# Patient Record
Sex: FEMALE | Race: ASIAN | NOT HISPANIC OR LATINO | Employment: UNEMPLOYED | URBAN - METROPOLITAN AREA
[De-identification: names, ages, dates, MRNs, and addresses within clinical notes are randomized per-mention and may not be internally consistent; named-entity substitution may affect disease eponyms.]

---

## 2017-01-06 ENCOUNTER — HOSPITAL ENCOUNTER (OUTPATIENT)
Dept: MAMMOGRAPHY | Facility: HOSPITAL | Age: 47
Discharge: HOME | End: 2017-01-06

## 2017-01-06 ENCOUNTER — RECORDS - HEALTHEAST (OUTPATIENT)
Dept: ADMINISTRATIVE | Facility: OTHER | Age: 47
End: 2017-01-06

## 2017-01-06 DIAGNOSIS — N64.4 BREAST PAIN, LEFT: ICD-10-CM

## 2017-06-17 ENCOUNTER — HEALTH MAINTENANCE LETTER (OUTPATIENT)
Age: 47
End: 2017-06-17

## 2017-12-09 ENCOUNTER — HOSPITAL ENCOUNTER (OUTPATIENT)
Dept: MAMMOGRAPHY | Facility: HOSPITAL | Age: 47
Discharge: HOME | End: 2017-12-09

## 2017-12-09 DIAGNOSIS — Z12.31 VISIT FOR SCREENING MAMMOGRAM: ICD-10-CM

## 2018-01-12 ENCOUNTER — HOSPITAL ENCOUNTER (OUTPATIENT)
Dept: MAMMOGRAPHY | Facility: HOSPITAL | Age: 48
Discharge: HOME | End: 2018-01-12
Attending: INTERPRETER

## 2019-01-28 ENCOUNTER — HOSPITAL ENCOUNTER (OUTPATIENT)
Dept: MAMMOGRAPHY | Facility: CLINIC | Age: 49
Discharge: HOME | End: 2019-01-28

## 2019-01-28 DIAGNOSIS — Z12.31 VISIT FOR SCREENING MAMMOGRAM: ICD-10-CM

## 2019-10-26 ENCOUNTER — AMBULATORY - HEALTHEAST (OUTPATIENT)
Dept: NURSING | Facility: CLINIC | Age: 49
End: 2019-10-26

## 2020-01-28 ENCOUNTER — TRANSFERRED RECORDS (OUTPATIENT)
Dept: HEALTH INFORMATION MANAGEMENT | Facility: CLINIC | Age: 50
End: 2020-01-28
Payer: COMMERCIAL

## 2020-09-17 ENCOUNTER — AMBULATORY - HEALTHEAST (OUTPATIENT)
Dept: NURSING | Facility: CLINIC | Age: 50
End: 2020-09-17

## 2022-03-24 ENCOUNTER — HOSPITAL ENCOUNTER (EMERGENCY)
Facility: HOSPITAL | Age: 52
Discharge: HOME | End: 2022-03-24
Admitting: EMERGENCY MEDICINE
Payer: COMMERCIAL

## 2022-03-24 VITALS
BODY MASS INDEX: 24.37 KG/M2 | DIASTOLIC BLOOD PRESSURE: 60 MMHG | WEIGHT: 129 LBS | HEART RATE: 73 BPM | TEMPERATURE: 97.9 F | SYSTOLIC BLOOD PRESSURE: 126 MMHG | OXYGEN SATURATION: 97 % | RESPIRATION RATE: 18 BRPM

## 2022-03-24 DIAGNOSIS — L30.9 DERMATITIS: ICD-10-CM

## 2022-03-24 PROCEDURE — 250N000013 HC RX MED GY IP 250 OP 250 PS 637: Performed by: EMERGENCY MEDICINE

## 2022-03-24 PROCEDURE — 99283 EMERGENCY DEPT VISIT LOW MDM: CPT

## 2022-03-24 RX ORDER — DIPHENHYDRAMINE HCL 50 MG
50 CAPSULE ORAL ONCE
Status: COMPLETED | OUTPATIENT
Start: 2022-03-24 | End: 2022-03-24

## 2022-03-24 RX ORDER — PREDNISONE 20 MG/1
TABLET ORAL
Qty: 10 TABLET | Refills: 0 | Status: SHIPPED | OUTPATIENT
Start: 2022-03-24 | End: 2022-04-07

## 2022-03-24 RX ADMIN — DIPHENHYDRAMINE HCL 50 MG: 50 CAPSULE ORAL at 23:22

## 2022-03-24 ASSESSMENT — ENCOUNTER SYMPTOMS
DIARRHEA: 0
FEVER: 0
ABDOMINAL PAIN: 0
CHILLS: 0
COUGH: 0
SHORTNESS OF BREATH: 0
NAUSEA: 0
VOMITING: 0

## 2022-03-25 NOTE — ED TRIAGE NOTES
Pt is suffering from a red, hot, itchy rash x 1 month. It usually gets worse at night. Cream given and used from primary doctor no help. The rash is located on the trunk.

## 2022-03-25 NOTE — DISCHARGE INSTRUCTIONS
You were seen in the emergency department today for evaluation of a rash.  This appears consistent with a dermatitis of some sort.  I will prescribe you a course of oral steroids to help suppress your bodies allergic response.  I would also recommend taking an antihistamine like Zyrtec, Claritin, or Benadryl.    Follow-up with your primary doctor if your symptoms do not improve.  Return to the ER if you develop any new or worsening symptoms like severe pain, fever, swelling of your lips or tongue, vomiting or diarrhea, worsening rash, or any other symptoms that concern you.

## 2022-03-25 NOTE — ED PROVIDER NOTES
EMERGENCY DEPARTMENT ENCOUNTER      NAME: Cindy Smith  AGE: 51 year old female  YOB: 1970  MRN: [id number]  EVALUATION DATE & TIME: No admission date for patient encounter.    PCP: Debi Nieves    ED PROVIDER: Susan Doyle PA-C      Chief Complaint   Patient presents with     Rash         FINAL IMPRESSION:  1. Dermatitis          ED COURSE & MEDICAL DECISION MAKING:    Pertinent Labs & Imaging studies reviewed. (See chart for details)    51 year old female presents to the Emergency Department for evaluation of rash.    Physical exam is remarkable for a generally well-appearing female who is in no acute distress.  She has an erythematous, fine papular, blanchable rash on her trunk and upper extremities.  No swelling of the lips or tongue.  Heart and lung sounds clear diffusely throughout with no wheezing.  Abdomen soft and nontender.  Vital signs are stable and she is afebrile.    The cause of her rash is not clear at this time.  It appears consistent with a dermatitis of some sort.  She has tried oral antihistamines and steroid cream at home with no significant improvement.  Patient is not diabetic so I think a short course of steroids may be helpful.  Prescription for prednisone sent to her pharmacy, advised to follow-up with her primary care provider if her symptoms do not resolve.  Recommend return to the ER for any new or worsening symptoms, she is agreeable with this treatment plan and verbalized her understanding.    ED Course   11:10 PM Performed my initial history and physical exam. Discussed workup in the emergency department, management of symptoms, and likely disposition.   I discussed the plan for discharge with the patient, and patient is agreeable. We discussed supportive cares at home and reasons for return to the ER including new or worsening symptoms - all questions and concerns addressed. Patient to be discharged by RN.    At the conclusion of the encounter I  discussed the results of all of the tests and the disposition. The questions were answered. The patient or family acknowledged understanding and was agreeable with the care plan.     Voice recognition software was used in the creation of this note. Any grammatical or nonsensical errors are due to inherent errors with the software and are not the intention of the writer.     MEDICATIONS GIVEN IN THE EMERGENCY:  Medications   diphenhydrAMINE (BENADRYL) capsule 50 mg (50 mg Oral Given 3/24/22 9745)       NEW PRESCRIPTIONS STARTED AT TODAY'S ER VISIT  New Prescriptions    PREDNISONE (DELTASONE) 20 MG TABLET    Take two tablets (= 40mg) each day for 5 (five) days            =================================================================    HPI    Patient information was obtained from: Patient    Use of : Declined; spouse acted as  (Yana)        Cindy Smith is a 51 year old female with PMH of proctitis who presents to the emergency department via walk-in with  for evaluation of a rash.    For the past 1 to 2 months, the patient has had a burning, itchy, erythematous rash on her trunk and bilateral upper and lower extremities.  The rash typically presents in the evening and gets worse overnight.  She reports it has increased in severity over the last 48 hours, prompting her presentation today.  She denies any new lotions, detergents, or body washes.   occasionally has a similar rash as well but no other members of the household do.  She has tried Zyrtec and cortisone cream without improvement.  She denies any lip or tongue swelling, vomiting or diarrhea, wheezing, difficulty breathing, fevers, or abdominal pain.      REVIEW OF SYSTEMS   Review of Systems   Constitutional: Negative for chills and fever.   Respiratory: Negative for cough and shortness of breath.    Cardiovascular: Negative for chest pain.   Gastrointestinal: Negative for abdominal pain, diarrhea, nausea and vomiting.    Skin: Positive for rash.         All other systems reviewed and are negative unless noted in HPI.      PAST MEDICAL HISTORY:  Past Medical History:   Diagnosis Date     Urinary frequency        PAST SURGICAL HISTORY:  Past Surgical History:   Procedure Laterality Date     MAMMOPLASTY AUGMENTATION Bilateral 2009     TOTAL HIP ARTHROPLASTY Right        CURRENT MEDICATIONS:    predniSONE (DELTASONE) 20 MG tablet  DULoxetine HCl (CYMBALTA PO)        ALLERGIES:  No Known Allergies    FAMILY HISTORY:  Family History   Problem Relation Age of Onset     Breast Cancer No family hx of        SOCIAL HISTORY:   Social History     Socioeconomic History     Marital status:      Spouse name: Not on file     Number of children: Not on file     Years of education: Not on file     Highest education level: Not on file   Occupational History     Not on file   Tobacco Use     Smoking status: Not on file     Smokeless tobacco: Not on file   Substance and Sexual Activity     Alcohol use: Yes     Comment: rarely     Drug use: No     Sexual activity: Not on file   Other Topics Concern     Not on file   Social History Narrative     Not on file     Social Determinants of Health     Financial Resource Strain: Not on file   Food Insecurity: Not on file   Transportation Needs: Not on file   Physical Activity: Not on file   Stress: Not on file   Social Connections: Not on file   Intimate Partner Violence: Not on file   Housing Stability: Not on file       VITALS:  Patient Vitals for the past 24 hrs:   BP Temp Temp src Pulse Resp SpO2 Weight   03/24/22 2236 131/60 97.9  F (36.6  C) Oral 75 18 96 % --   03/24/22 2234 -- 98.3  F (36.8  C) Oral -- -- -- 58.5 kg (129 lb)       PHYSICAL EXAM    VITAL SIGNS: /60   Pulse 75   Temp 97.9  F (36.6  C) (Oral)   Resp 18   Wt 58.5 kg (129 lb)   LMP 03/13/2022 (Exact Date)   SpO2 96%   BMI 24.37 kg/m    General Appearance: Alert, cooperative, normal speech and facial symmetry, appears  stated age, the patient does not appear in distress  Head:  Normocephalic, without obvious abnormality, atraumatic  Eyes: Conjunctiva/corneas clear, EOM's intact, no nystagmus, PERRL  ENT:  Lips, mucosa, and tongue normal; teeth and gums normal, no pharyngeal inflammation, no dysphonia or difficulty swallowing, membranes are moist without pallor  Cardio:  Regular rate and rhythm, S1 and S2 normal, no murmur, rub    or gallop, 2+ pulses symmetric in all extremities  Pulm:  Clear to auscultation bilaterally, respirations unlabored with no accessory muscle use  Abdomen:  Abdomen is soft, non-distended with no tenderness to palpation, rebound tenderness, or guarding.   Extremities: Moves all extremities  Skin:  Erythematous, fine papular, blanchable rash on her trunk and upper extremities  Neuro: Patient is awake, alert, and responsive to voice. No gross motor weaknesses or sensory loss; moves all extremities.     LAB:  All pertinent labs reviewed and interpreted.  Labs Ordered and Resulted from Time of ED Arrival to Time of ED Departure - No data to display    RADIOLOGY:  Reviewed all pertinent imaging. Please see official radiology report.  No orders to display        Susan Doyle PA-C  Emergency Medicine  [facility] EMERGENCY DEPARTMENT  [address] [id number] [phone]  Dept: [phone]       Susan Doyle PA-C  03/24/22 7618

## 2022-04-07 ENCOUNTER — HOSPITAL ENCOUNTER (EMERGENCY)
Facility: HOSPITAL | Age: 52
Discharge: HOME | End: 2022-04-07
Attending: EMERGENCY MEDICINE | Admitting: EMERGENCY MEDICINE
Payer: COMMERCIAL

## 2022-04-07 VITALS
DIASTOLIC BLOOD PRESSURE: 63 MMHG | BODY MASS INDEX: 24.55 KG/M2 | RESPIRATION RATE: 15 BRPM | HEART RATE: 74 BPM | TEMPERATURE: 98.4 F | HEIGHT: 61 IN | WEIGHT: 130 LBS | OXYGEN SATURATION: 96 % | SYSTOLIC BLOOD PRESSURE: 136 MMHG

## 2022-04-07 DIAGNOSIS — L20.9 ATOPIC DERMATITIS, UNSPECIFIED TYPE: ICD-10-CM

## 2022-04-07 PROCEDURE — 99282 EMERGENCY DEPT VISIT SF MDM: CPT

## 2022-04-07 RX ORDER — MINERAL OIL/HYDROPHIL PETROLAT
OINTMENT (GRAM) TOPICAL PRN
Qty: 396 G | Refills: 0 | Status: SHIPPED | OUTPATIENT
Start: 2022-04-07 | End: 2022-04-11

## 2022-04-07 RX ORDER — PREDNISONE 20 MG/1
TABLET ORAL
Qty: 10 TABLET | Refills: 0 | Status: SHIPPED | OUTPATIENT
Start: 2022-04-07 | End: 2022-05-05

## 2022-04-07 ASSESSMENT — ENCOUNTER SYMPTOMS
SORE THROAT: 1
SHORTNESS OF BREATH: 0

## 2022-04-07 NOTE — ED PROVIDER NOTES
"ED Triage Provider Note  [facility]  Encounter Date: Apr 7, 2022    History:  Chief Complaint   Patient presents with     Rash     Cindy Smith is a 51 year old female who presents to the ED with itchy rash to neck and body. Started last month, was on prednisone from 3/24-29 which helped, but it has since returned and is more bothersome. No oral involvement     Review of Systems:  See HPI    Exam:  /63   Pulse 74   Temp 98.4  F (36.9  C) (Temporal)   Resp 15   Ht 1.549 m (5' 1\")   Wt 59 kg (130 lb)   LMP 03/13/2022 (Exact Date)   SpO2 96%   BMI 24.56 kg/m    General: No acute distress. Appears stated age.   Cardio: Regular rate, extremities well perfused  Resp: Normal work of breathing, grossly normal respiratory rate  Neuro: Alert. CN II-XII grossly intact. Grossly intact strength.   Skin: erythema to neck     Medical Decision Making:  Patient arriving to the ED with problem as above. A medical screening exam was performed. Orders initiated from Triage. The patient is appropriate to wait in triage.    Initial DDx includes: urticaria, dermatitis    Wilmer Harper MD on 4/7/2022 at 5:44 PM     Wilmer Harper MD  04/07/22 1746    "

## 2022-04-07 NOTE — DISCHARGE INSTRUCTIONS
Avoid any jewelry around your neck. Use the aquaphor twice a day. Use a scentless soap like Vanicream soap to shower. Please follow-up with your regular doctor as scheduled for Monday.

## 2022-04-07 NOTE — ED PROVIDER NOTES
EMERGENCY DEPARTMENT ENCOUNTER      NAME: Cindy Smith  AGE: 51 year old female  YOB: 1970  MRN: [id number]  EVALUATION DATE & TIME: 4/7/2022  5:51 PM    PCP: Milli Cooper    ED PROVIDER: Niecy Horn MD      Chief Complaint   Patient presents with     Rash         FINAL IMPRESSION:  1. Atopic dermatitis, unspecified type          ED COURSE & MEDICAL DECISION MAKING:    Pertinent Labs & Imaging studies reviewed. (See chart for details)  51 year old female presents to the Emergency Department for evaluation of rash.  The patient was seen here about 1 to 2 weeks ago for similar rash.  The day after finishing a 5-day course of prednisone which had improved the rash, it returned.  She has had ongoing itching and some mild edema associated with the rash.  It is visible on her neck.  She denies any new exposures, including no new medications, soaps, detergents.  She also reported itching on her abdomen and her legs.  On my exam, she reports that has currently improved and there is no visible rash on her abdomen or legs or any other part of her body.  The rash on her neck appears more like a local area of eczema with associated excoriations.  I do not believe there is an associated infection.  The patient reports that the itching and soreness was so bad yesterday that she had a sore throat, today she does not have a sore throat.  She denies any shortness of breath, throat tightness, abdominal pain, diarrhea, any other systemic illness.  Plan to place the patient on another course of prednisone, recommend management like eczema with an unscented emollient lotion and soap and use of steroids.  The patient does have an outpatient appointment on Monday with her primary care provider.  I recommend keeping this as if this does not improve the rash may be amenable to a biopsy to determine the etiology of it.  I also recommend discontinuing wearing any kind of necklaces or jewelry as this may also be a contact  dermatitis.    5:58 PM I met with the patient to gather history and to perform my initial exam. I discussed the plan for care while in the Emergency Department. PPE (glove and N95 mask) was worn by me during patient encounters while patient wore mask.   6:05 PM We discussed plans for discharge and patient is agreeable.    At the conclusion of the encounter I discussed the results of all of the tests and the disposition. The questions were answered. The patient or family acknowledged understanding and was agreeable with the care plan.     MEDICATIONS GIVEN IN THE EMERGENCY:  Medications - No data to display    NEW PRESCRIPTIONS STARTED AT TODAY'S ER VISIT  New Prescriptions    MINERAL OIL-HYDROPHILIC PETROLATUM (AQUAPHOR) EXTERNAL OINTMENT    Apply topically as needed for dry skin or irritation          =================================================================    HPI    Patient information was obtained from: Patient    Use of : Yes (family member) - Language: Yana Smith is a 51 year old female without a pertinent history who presents to this ED by walk in for evaluation of rash.    Per chart review, patient was seen in ED on 3/24/22 (2 weeks ago) for rash. She was given benadryl and discharge with a 5 day course of prednisone (10 mg BID)    Patient reports she has had a rash to her neck for several weeks. She was previously seen for this 2 weeks ago and took prescribed prednisone with improvement. However, 1 day after finishing the course of medications the rash returned with associated itching, pain, and swelling to her neck. Patient additionally notes some rash to her abdomen and lower extremities but states currently this has improved. She notes a history of similar symptoms that resolved after several days. She initially used benadryl and a cream without relief. She denies any recent changes in soaps, detergents, or pets. Patient reports a sore throat yesterday but denies any  throat tightness or difficulty breathing. Patient does take medications for depression and constipation but denies any recent change. She denies smoking or EtOH consumption. Of note, patient does have an appointment with her PCP on Monday (4/11/22). Patient denies additional medical concerns or complaints at this time.      REVIEW OF SYSTEMS   Review of Systems   HENT: Positive for sore throat.         Negative for throat tightness.   Respiratory: Negative for shortness of breath.    Skin: Positive for rash (neck; abdomen; and lower extremities; pruritic and painful).        Positive for neck swelling.   All other systems reviewed and are negative.     PAST MEDICAL HISTORY:  Past Medical History:   Diagnosis Date     Urinary frequency        PAST SURGICAL HISTORY:  Past Surgical History:   Procedure Laterality Date     MAMMOPLASTY AUGMENTATION Bilateral 2009     TOTAL HIP ARTHROPLASTY Right            CURRENT MEDICATIONS:    mineral oil-hydrophilic petrolatum (AQUAPHOR) external ointment  predniSONE (DELTASONE) 20 MG tablet  DULoxetine HCl (CYMBALTA PO)        ALLERGIES:  No Known Allergies    FAMILY HISTORY:  Family History   Problem Relation Age of Onset     Breast Cancer No family hx of        SOCIAL HISTORY:   Social History     Socioeconomic History     Marital status:      Spouse name: Not on file     Number of children: Not on file     Years of education: Not on file     Highest education level: Not on file   Occupational History     Not on file   Tobacco Use     Smoking status: Not on file     Smokeless tobacco: Not on file   Substance and Sexual Activity     Alcohol use: Yes     Comment: rarely     Drug use: No     Sexual activity: Not on file   Other Topics Concern     Not on file   Social History Narrative     Not on file     Social Determinants of Health     Financial Resource Strain: Not on file   Food Insecurity: Not on file   Transportation Needs: Not on file   Physical Activity: Not on file  "  Stress: Not on file   Social Connections: Not on file   Intimate Partner Violence: Not on file   Housing Stability: Not on file       VITALS:  /63   Pulse 74   Temp 98.4  F (36.9  C) (Temporal)   Resp 15   Ht 1.549 m (5' 1\")   Wt 59 kg (130 lb)   LMP 03/13/2022 (Exact Date)   SpO2 96%   BMI 24.56 kg/m      PHYSICAL EXAM    Constitutional: Well developed, Well nourished, NAD  HENT: Normocephalic, Atraumatic, Bilateral external ears normal, Oropharynx normal, mucous membranes moist, Nose normal.   Neck- Normal range of motion, No tenderness, Supple, No stridor.  Eyes: PERRL, EOMI, Conjunctiva normal, No discharge.   Respiratory: Normal breath sounds, No respiratory distress  Cardiovascular: Normal heart rate, Regular rhythm  GI: Bowel sounds normal, Soft, No tenderness,   Musculoskeletal: No edema. Good range of motion in all major joints. No tenderness to palpation or major deformities noted.   Integument: Warm, Dry, erythema circumferentially around the neck with associated excoriations. NO warmth, some mild soft tissue edema along the left anterior neck with no fluctuance or palpable mass. NO rash appreciated on abdomen or legs.  Neurologic: Alert & oriented x 3, Normal motor function, Normal sensory function, No focal deficits noted. Normal gait.   Psychiatric: Affect normal, Judgment normal, Mood normal.      LAB:  All pertinent labs reviewed and interpreted.       RADIOLOGY:  Reviewed all pertinent imaging. Please see official radiology report.  No orders to display       I, Wen Prince, am serving as a scribe to document services personally performed by Niecy Horn, based on my observation and the provider's statements to me. I, Niecy Horn MD, attest that Wen Prince is acting in a scribe capacity, has observed my performance of the services and has documented them in accordance with my direction.    Niecy Horn MD  Emergency Medicine  [facility] EMERGENCY DEPARTMENT  [address] [place] [id number]  [phone]     Niecy Horn MD  04/07/22 4805

## 2022-04-07 NOTE — ED TRIAGE NOTES
Patient reporting rash to left neck and abdomen, has been seen here before for this and received prednisone which helped but now the rash has returned and is itchy and painful.

## 2022-04-08 ENCOUNTER — PATIENT OUTREACH (OUTPATIENT)
Dept: FAMILY MEDICINE | Facility: CLINIC | Age: 52
End: 2022-04-08
Payer: COMMERCIAL

## 2022-04-08 NOTE — PROGRESS NOTES
I got a call from the pt, pt stated that she is doing fine. She stated that she has a follow up on 04/11/2022 at 8:20am with .

## 2022-04-08 NOTE — PROGRESS NOTES
Clinic Care Coordination Contact    Follow Up Progress Note      Assessment: The pt was recently in the ED, I called to check up on the pt, and help the pt setup a ED follow up. The pt was at [facility] for a rash. I called the pt, but got her vm, so l left a vm for the pt to give me a call back.    Care Gaps:    Health Maintenance Due   Topic Date Due     PREVENTIVE CARE VISIT  Never done     ADVANCE CARE PLANNING  Never done     COLORECTAL CANCER SCREENING  Never done     HIV SCREENING  Never done     HEPATITIS C SCREENING  Never done     PAP  Never done     LIPID  Never done     MAMMO SCREENING  01/28/2020     ZOSTER IMMUNIZATION (1 of 2) Never done     PHQ-2 (once per calendar year)  Never done     COVID-19 Vaccine (2 - Moderna 3-dose series) 02/08/2022           Goals addressed this encounter:   Goals Addressed    None         Intervention/Education provided during outreach:               Plan:     Care Coordinator will follow up in month

## 2022-04-11 ENCOUNTER — OFFICE VISIT (OUTPATIENT)
Dept: FAMILY MEDICINE | Facility: CLINIC | Age: 52
End: 2022-04-11
Payer: COMMERCIAL

## 2022-04-11 VITALS
RESPIRATION RATE: 16 BRPM | HEIGHT: 61 IN | HEART RATE: 71 BPM | BODY MASS INDEX: 24.51 KG/M2 | WEIGHT: 129.8 LBS | TEMPERATURE: 98 F | DIASTOLIC BLOOD PRESSURE: 81 MMHG | OXYGEN SATURATION: 99 % | SYSTOLIC BLOOD PRESSURE: 126 MMHG

## 2022-04-11 DIAGNOSIS — K62.89 PROCTITIS: ICD-10-CM

## 2022-04-11 DIAGNOSIS — L29.89 CHRONIC PRURITIC RASH IN ADULT: Primary | ICD-10-CM

## 2022-04-11 DIAGNOSIS — Z12.31 VISIT FOR SCREENING MAMMOGRAM: ICD-10-CM

## 2022-04-11 DIAGNOSIS — F33.1 MODERATE EPISODE OF RECURRENT MAJOR DEPRESSIVE DISORDER (H): ICD-10-CM

## 2022-04-11 PROCEDURE — 99204 OFFICE O/P NEW MOD 45 MIN: CPT | Performed by: FAMILY MEDICINE

## 2022-04-11 PROCEDURE — T1013 SIGN LANG/ORAL INTERPRETER: HCPCS | Performed by: FAMILY MEDICINE

## 2022-04-11 RX ORDER — TRAZODONE HYDROCHLORIDE 50 MG/1
1 TABLET, FILM COATED ORAL
COMMUNITY
Start: 2022-03-15 | End: 2022-08-04

## 2022-04-11 RX ORDER — ARIPIPRAZOLE 5 MG/1
5 TABLET ORAL DAILY
COMMUNITY
Start: 2022-03-15 | End: 2022-08-04

## 2022-04-11 RX ORDER — HYDROXYZINE PAMOATE 25 MG/1
25 CAPSULE ORAL 3 TIMES DAILY PRN
Qty: 90 CAPSULE | Refills: 0 | Status: SHIPPED | OUTPATIENT
Start: 2022-04-11 | End: 2022-08-04

## 2022-04-11 RX ORDER — MESALAMINE 1000 MG/1
1000 SUPPOSITORY RECTAL AT BEDTIME
COMMUNITY
Start: 2021-06-15 | End: 2022-08-04

## 2022-04-11 RX ORDER — ACETAMINOPHEN 160 MG
2000 TABLET,DISINTEGRATING ORAL DAILY
COMMUNITY
Start: 2021-12-30 | End: 2022-08-04

## 2022-04-11 RX ORDER — MINERAL OIL/HYDROPHIL PETROLAT
OINTMENT (GRAM) TOPICAL 2 TIMES DAILY
Qty: 396 G | Refills: 0 | Status: SHIPPED | OUTPATIENT
Start: 2022-04-11 | End: 2022-08-04

## 2022-04-11 RX ORDER — PAROXETINE 20 MG/1
20 TABLET, FILM COATED ORAL DAILY
COMMUNITY
Start: 2022-03-15 | End: 2022-08-04

## 2022-04-11 RX ORDER — BALSALAZIDE DISODIUM 750 MG/1
750 CAPSULE ORAL 3 TIMES DAILY
COMMUNITY
Start: 2022-03-15 | End: 2022-08-04

## 2022-04-11 RX ORDER — TRIAMCINOLONE ACETONIDE 1 MG/G
OINTMENT TOPICAL 2 TIMES DAILY
Qty: 80 G | Refills: 1 | Status: SHIPPED | OUTPATIENT
Start: 2022-04-11 | End: 2022-08-04

## 2022-04-11 NOTE — NURSING NOTE
Due to patient being non-English speaking/uses sign language, an  was used for this visit. Only for face-to-face interpretation by an external agency, date and length of interpretation can be found on the scanned worksheet.     name: Jayne  Agency: [employer]  Language: Debbieong   Telephone number: [phone]  Type of interpretation: Face-to-face, spoken

## 2022-04-11 NOTE — PROGRESS NOTES
Assessment & Plan     Chronic pruritic rash in adult    - mineral oil-hydrophilic petrolatum (AQUAPHOR) external ointment; Apply topically 2 times daily  - triamcinolone (KENALOG) 0.1 % external ointment; Apply topically 2 times daily  - hydrOXYzine (VISTARIL) 25 MG capsule; Take 1 capsule (25 mg) by mouth as needed in the morning and 1 capsule (25 mg) as needed at noon and 1 capsule (25 mg) as needed in the evening for itching.    Visit for screening mammogram    - MA SCREENING DIGITAL BILAT - Future  (s+30); Future    Proctitis  Follows with GI, taking medication    Moderate episode of recurrent major depressive disorder (H)  Continue to follow with psychiatry. Stable on current medications                   No follow-ups on file.    Milli Cooper DO  [facility] [place]    Vi White is a 51 year old who presents for the following health issues     HPI     Insurance change so needs a new clinic.    1. Rash: Has been to the ER on 3/24/22 and 4/7/22 for rash on neck. Was given a prescription for prednisone for home and benadryl was given in ER. The second time in the ER was given a prescription for aquaphor (but never picked up the ointment).     She was seen at a  clinic in 11/2021 also for rash of the lower extremities. She states the rash on the body and neck started at the same time of legs.  also has a rash. His itching is worse at night. Sleep in same bed. Has a new mattress in her home and was brand new from store.   Also has tried hydrocortisone cream, but did not feel it was helpful     2. Mental health concerns- Sees Dr. Carcamo on paroxetine and abilify for depression with psychotic features    3. Colitis: Follows with GI, has Rectal suppository at home and uses if she starts to see blood in her stool.     Medication list was updated.  HCM: colonoscopy 2020, mammogram- accepted      Objective    /81   Pulse 71   Temp 98  F (36.7  C) (Oral)   Resp 16   Ht  "1.549 m (5' 1\")   Wt 58.9 kg (129 lb 12.8 oz)   LMP 03/13/2022 (Exact Date)   SpO2 99%   BMI 24.53 kg/m    Body mass index is 24.53 kg/m .  Physical Exam   GENERAL: healthy, alert and no distress  RESP: lungs clear to auscultation - no rales, rhonchi or wheezes  CV: regular rate and rhythm, normal S1 S2, no S3 or S4, no murmur, click or rub, no peripheral edema and peripheral pulses strong  MS: no gross musculoskeletal defects noted, no edema  SKIN: rash at left side of neck, well demarcated, 2-3cm in diameter, erythematous, excoriation present, also on lower legs few scared circular lesion                "

## 2022-05-05 ENCOUNTER — OFFICE VISIT (OUTPATIENT)
Dept: FAMILY MEDICINE | Facility: CLINIC | Age: 52
End: 2022-05-05
Payer: COMMERCIAL

## 2022-05-05 VITALS
BODY MASS INDEX: 23.37 KG/M2 | SYSTOLIC BLOOD PRESSURE: 110 MMHG | HEIGHT: 62 IN | HEART RATE: 66 BPM | RESPIRATION RATE: 16 BRPM | DIASTOLIC BLOOD PRESSURE: 74 MMHG | WEIGHT: 127 LBS | OXYGEN SATURATION: 99 %

## 2022-05-05 DIAGNOSIS — L29.89 CHRONIC PRURITIC RASH IN ADULT: Primary | ICD-10-CM

## 2022-05-05 DIAGNOSIS — Z11.59 NEED FOR HEPATITIS C SCREENING TEST: ICD-10-CM

## 2022-05-05 DIAGNOSIS — F33.1 MODERATE EPISODE OF RECURRENT MAJOR DEPRESSIVE DISORDER (H): ICD-10-CM

## 2022-05-05 DIAGNOSIS — Z11.4 SCREENING FOR HIV (HUMAN IMMUNODEFICIENCY VIRUS): ICD-10-CM

## 2022-05-05 DIAGNOSIS — Z13.220 SCREENING FOR HYPERLIPIDEMIA: ICD-10-CM

## 2022-05-05 DIAGNOSIS — Z12.4 CERVICAL CANCER SCREENING: ICD-10-CM

## 2022-05-05 DIAGNOSIS — K62.89 PROCTITIS: ICD-10-CM

## 2022-05-05 LAB
ALBUMIN SERPL-MCNC: 3.7 G/DL (ref 3.5–5)
ALP SERPL-CCNC: 53 U/L (ref 45–120)
ALT SERPL W P-5'-P-CCNC: 27 U/L (ref 0–45)
ANION GAP SERPL CALCULATED.3IONS-SCNC: 12 MMOL/L (ref 5–18)
AST SERPL W P-5'-P-CCNC: 21 U/L (ref 0–40)
BASOPHILS # BLD AUTO: 0.1 10E3/UL (ref 0–0.2)
BASOPHILS NFR BLD AUTO: 1 %
BILIRUB SERPL-MCNC: 0.7 MG/DL (ref 0–1)
BUN SERPL-MCNC: 15 MG/DL (ref 8–22)
CALCIUM SERPL-MCNC: 9 MG/DL (ref 8.5–10.5)
CHLORIDE BLD-SCNC: 108 MMOL/L (ref 98–107)
CHOLEST SERPL-MCNC: 210 MG/DL
CO2 SERPL-SCNC: 22 MMOL/L (ref 22–31)
CREAT SERPL-MCNC: 0.68 MG/DL (ref 0.6–1.1)
EOSINOPHIL # BLD AUTO: 0.3 10E3/UL (ref 0–0.7)
EOSINOPHIL NFR BLD AUTO: 5 %
ERYTHROCYTE [DISTWIDTH] IN BLOOD BY AUTOMATED COUNT: 12.6 % (ref 10–15)
FASTING STATUS PATIENT QL REPORTED: ABNORMAL
GFR SERPL CREATININE-BSD FRML MDRD: >90 ML/MIN/1.73M2
GLUCOSE BLD-MCNC: 94 MG/DL (ref 70–125)
HCT VFR BLD AUTO: 41.7 % (ref 35–47)
HDLC SERPL-MCNC: 70 MG/DL
HGB BLD-MCNC: 13.8 G/DL (ref 11.7–15.7)
HIV 1+2 AB+HIV1 P24 AG SERPL QL IA: NEGATIVE
IMM GRANULOCYTES # BLD: 0 10E3/UL
IMM GRANULOCYTES NFR BLD: 0 %
LDLC SERPL CALC-MCNC: 129 MG/DL
LYMPHOCYTES # BLD AUTO: 1.6 10E3/UL (ref 0.8–5.3)
LYMPHOCYTES NFR BLD AUTO: 33 %
MCH RBC QN AUTO: 30.1 PG (ref 26.5–33)
MCHC RBC AUTO-ENTMCNC: 33.1 G/DL (ref 31.5–36.5)
MCV RBC AUTO: 91 FL (ref 78–100)
MONOCYTES # BLD AUTO: 0.4 10E3/UL (ref 0–1.3)
MONOCYTES NFR BLD AUTO: 8 %
NEUTROPHILS # BLD AUTO: 2.7 10E3/UL (ref 1.6–8.3)
NEUTROPHILS NFR BLD AUTO: 54 %
PLATELET # BLD AUTO: 213 10E3/UL (ref 150–450)
POTASSIUM BLD-SCNC: 3.8 MMOL/L (ref 3.5–5)
PROT SERPL-MCNC: 6.8 G/DL (ref 6–8)
RBC # BLD AUTO: 4.58 10E6/UL (ref 3.8–5.2)
SODIUM SERPL-SCNC: 142 MMOL/L (ref 136–145)
TRIGL SERPL-MCNC: 53 MG/DL
TSH SERPL DL<=0.005 MIU/L-ACNC: 0.95 UIU/ML (ref 0.3–5)
WBC # BLD AUTO: 4.9 10E3/UL (ref 4–11)

## 2022-05-05 PROCEDURE — 87389 HIV-1 AG W/HIV-1&-2 AB AG IA: CPT | Performed by: FAMILY MEDICINE

## 2022-05-05 PROCEDURE — 99214 OFFICE O/P EST MOD 30 MIN: CPT | Performed by: FAMILY MEDICINE

## 2022-05-05 PROCEDURE — 80061 LIPID PANEL: CPT | Performed by: FAMILY MEDICINE

## 2022-05-05 PROCEDURE — 86803 HEPATITIS C AB TEST: CPT | Performed by: FAMILY MEDICINE

## 2022-05-05 PROCEDURE — T1013 SIGN LANG/ORAL INTERPRETER: HCPCS | Performed by: FAMILY MEDICINE

## 2022-05-05 PROCEDURE — 80050 GENERAL HEALTH PANEL: CPT | Performed by: FAMILY MEDICINE

## 2022-05-05 PROCEDURE — 36415 COLL VENOUS BLD VENIPUNCTURE: CPT | Performed by: FAMILY MEDICINE

## 2022-05-05 ASSESSMENT — PATIENT HEALTH QUESTIONNAIRE - PHQ9: SUM OF ALL RESPONSES TO PHQ QUESTIONS 1-9: 16

## 2022-05-05 NOTE — LETTER
May 23, 2022      Cindy Smith  [address] [place] [address]        Dear ,    We are writing to inform you of your test results.      Resulted Orders   HIV Screening   Result Value Ref Range    HIV Antigen Antibody Combo Negative Negative   Hepatitis C Screen Reflex to HCV RNA Quant and Genotype   Result Value Ref Range    Hepatitis C Antibody Nonreactive Nonreactive    Narrative    Assay performance characteristics have not been established for newborns, infants, and children.   Lipid panel reflex to direct LDL Fasting   Result Value Ref Range    Cholesterol 210 (H) <=199 mg/dL    Triglycerides 53 <=149 mg/dL    Direct Measure HDL 70 >=50 mg/dL      Comment:      HDL Cholesterol Reference Range:     0-2 years:   No reference ranges established for patients under 2 years old  at [facility] for lipid analytes.    2-8 years:  Greater than 45 mg/dL     18 years and older:   Female: Greater than or equal to 50 mg/dL   Male:   Greater than or equal to 40 mg/dL    LDL Cholesterol Calculated 129 <=129 mg/dL    Patient Fasting > 8hrs? Unknown    Comprehensive metabolic panel   Result Value Ref Range    Sodium 142 136 - 145 mmol/L    Potassium 3.8 3.5 - 5.0 mmol/L    Chloride 108 (H) 98 - 107 mmol/L    Carbon Dioxide (CO2) 22 22 - 31 mmol/L    Anion Gap 12 5 - 18 mmol/L    Urea Nitrogen 15 8 - 22 mg/dL    Creatinine 0.68 0.60 - 1.10 mg/dL    Calcium 9.0 8.5 - 10.5 mg/dL    Glucose 94 70 - 125 mg/dL    Alkaline Phosphatase 53 45 - 120 U/L    AST 21 0 - 40 U/L    ALT 27 0 - 45 U/L    Protein Total 6.8 6.0 - 8.0 g/dL    Albumin 3.7 3.5 - 5.0 g/dL    Bilirubin Total 0.7 0.0 - 1.0 mg/dL    GFR Estimate >90 >60 mL/min/1.73m2      Comment:      Effective December 21, 2021 eGFRcr in adults is calculated using the 2021 CKD-EPI creatinine equation which includes age and gender (Montez gibbs al., NEJM, DOI: 10.1056/VOURdz3837570)   TSH with free T4 reflex   Result Value Ref Range    TSH 0.95 0.30 - 5.00 uIU/mL   CBC  with platelets and differential   Result Value Ref Range    WBC Count 4.9 4.0 - 11.0 10e3/uL    RBC Count 4.58 3.80 - 5.20 10e6/uL    Hemoglobin 13.8 11.7 - 15.7 g/dL    Hematocrit 41.7 35.0 - 47.0 %    MCV 91 78 - 100 fL    MCH 30.1 26.5 - 33.0 pg    MCHC 33.1 31.5 - 36.5 g/dL    RDW 12.6 10.0 - 15.0 %    Platelet Count 213 150 - 450 10e3/uL    % Neutrophils 54 %    % Lymphocytes 33 %    % Monocytes 8 %    % Eosinophils 5 %    % Basophils 1 %    % Immature Granulocytes 0 %    Absolute Neutrophils 2.7 1.6 - 8.3 10e3/uL    Absolute Lymphocytes 1.6 0.8 - 5.3 10e3/uL    Absolute Monocytes 0.4 0.0 - 1.3 10e3/uL    Absolute Eosinophils 0.3 0.0 - 0.7 10e3/uL    Absolute Basophils 0.1 0.0 - 0.2 10e3/uL    Absolute Immature Granulocytes 0.0 <=0.4 10e3/uL       If you have any questions or concerns, please call the clinic at the number listed above.       Sincerely,      Milli Cooper, DO

## 2022-05-05 NOTE — PROGRESS NOTES
Assessment & Plan     Chronic pruritic rash in adult  Continue with vistaril and Dove sensitive soap and lotions    Screening for HIV (human immunodeficiency virus)  - HIV Screening  - HIV Screening    Need for hepatitis C screening test  - Hepatitis C Screen Reflex to HCV RNA Quant and Genotype  - Hepatitis C Screen Reflex to HCV RNA Quant and Genotype      Screening for hyperlipidemia  - Lipid panel reflex to direct LDL Fasting  - Lipid panel reflex to direct LDL Fasting    Moderate episode of recurrent major depressive disorder (H)  Plan to continue with therapy and psychiatry  - Comprehensive metabolic panel  - TSH with free T4 reflex  - Comprehensive metabolic panel  - TSH with free T4 reflex    Proctitis  Should have appointment with GI in the near future, will need colonoscopy surveillance for IBD  - CBC with Platelets & Differential  - CBC with Platelets & Differential    Scheduled for mammogram in June. Plan for pap/pelvic in June.               Depression Screening Follow Up    PHQ 5/5/2022   PHQ-9 Total Score 16   Q9: Thoughts of better off dead/self-harm past 2 weeks Not at all       Follow Up    Follow Up Actions Taken  Crisis resource information provided in the After Visit Summary  Referred patient back to mental health provider    Discussed the following ways the patient can remain in a safe environment:  be around others    No follow-ups on file.    Milli Cooper DO  [facility]    Vi White is a 51 year old who presents for the following health issues  accompanied by her .    HPI     1. Chronic rash: Vistaril helped with the itchiness. Comes and goes and gets very itchy at times. Heat does not trigger the rash, but a cool compress does feel better. She has been scratching at her left flank area the most over the last few days.    2. Depression/Anxiety: Most of her depression is around the concerns of her medical issues. She takes Abilify and  "Paroxetine in the morning. Has a therapist. Ivette Padilla who she see once a month. Dr Carcamo is her psychiatrist and prescribes her medications thru [facility].   PHQ 5/5/2022   PHQ-9 Total Score 16   Q9: Thoughts of better off dead/self-harm past 2 weeks Not at all         3. HCM: Mammogram is scheduled for June 9th,   Will get labs today, send lab results by mail, last colonoscopy was 1/2020 (normal , hemorrhoids).    4.  Ulcerative proctitis: Follows with [facility], but has not been there for awhile due to the pandemic.      Objective    /74   Pulse 66   Resp 16   Ht 1.575 m (5' 2\")   Wt 57.6 kg (127 lb)   LMP 03/13/2022 (Exact Date)   SpO2 99%   BMI 23.23 kg/m    Body mass index is 23.23 kg/m .  Physical Exam   GENERAL: healthy, alert and no distress  RESP: lungs clear to auscultation - no rales, rhonchi or wheezes  CV: regular rate and rhythm, normal S1 S2, no S3 or S4, no murmur, click or rub, no peripheral edema and peripheral pulses strong  ABDOMEN: soft, nontender, no hepatosplenomegaly, no masses and bowel sounds normal  MS: no gross musculoskeletal defects noted, no edema  SKIN: excoriation - left flank area                    "

## 2022-05-05 NOTE — PATIENT INSTRUCTIONS
For your rash: Consider sensitive lotion such as Aveeno or Eucerin    Thank for going to get your mammogram on 6-9-2022!    Schedule next appointment in June for pap/pelvic exam

## 2022-05-05 NOTE — NURSING NOTE
Due to patient being non-English speaking/uses sign language, an  was used for this visit. Only for face-to-face interpretation by an external agency, date and length of interpretation can be found on the scanned worksheet.     name: Nina  Agency: [employer]  Language: Debbieong   Telephone number: [phone]  Type of interpretation: Face-to-face, spoken

## 2022-05-06 LAB — HCV AB SERPL QL IA: NONREACTIVE

## 2022-06-09 ENCOUNTER — ANCILLARY PROCEDURE (OUTPATIENT)
Dept: MAMMOGRAPHY | Facility: CLINIC | Age: 52
End: 2022-06-09
Attending: FAMILY MEDICINE
Payer: COMMERCIAL

## 2022-06-09 DIAGNOSIS — Z12.31 VISIT FOR SCREENING MAMMOGRAM: ICD-10-CM

## 2022-06-09 PROCEDURE — 77067 SCR MAMMO BI INCL CAD: CPT

## 2022-07-21 NOTE — PROGRESS NOTES
Assessment & Plan     Moderate episode of recurrent major depressive disorder (H)  Symptoms presently well controlled. Denies any need for medication refills at this time. Desires referral for new psychiatrist for medication management, since her previous psychiatrist is now not covered with insurance change.   - Adult Mental Health  Referral; Future  -Continue regular therapy as able  -Return to clinic as needed             FUTURE APPOINTMENTS:       - Follow-up visit for PAP as able    No follow-ups on file.    Sohail Hoffmann, DO [facility]    Precepted with Dr. Sandra Smith MD      Vi White is a 52 year old, presenting for the following health issues:  Recheck Medication (Like to discuss medication./Need med refill. See psychiatrist every 2 months./Like to see pharmacist.)      HPI     Depression Followup    How are you doing with your depression since your last visit? No change, everything is going well at the [facility]. Humble sees psychiatry again in September.     Are you having other symptoms that might be associated with depression? No    Have you had a significant life event?  No     Are you feeling anxious or having panic attacks?   No    Do you have any concerns with your use of alcohol or other drugs? No    She describes her overall mental health as stable. No hallucinations, psychoses, no low mood or suicidal thoughts presently. She sees a therapist. She would like a referral to a different psychiatrist, since her insurance has recently changed.    Current medications:  Abilify 5mg daily  Paxil 20mg daily  Trazodone 50mg at bedtime    Social History     Tobacco Use     Smoking status: Never Smoker     Smokeless tobacco: Never Used   Substance Use Topics     Alcohol use: Yes     Comment: rarely     Drug use: No     PHQ 5/5/2022 7/22/2022   PHQ-9 Total Score 16 0   Q9: Thoughts of better off dead/self-harm past 2 weeks Not at all Not at all     No flowsheet  "data found.  Last PHQ-9 7/22/2022   1.  Little interest or pleasure in doing things 0   2.  Feeling down, depressed, or hopeless 0   3.  Trouble falling or staying asleep, or sleeping too much 0   4.  Feeling tired or having little energy 0   5.  Poor appetite or overeating 0   6.  Feeling bad about yourself 0   7.  Trouble concentrating 0   8.  Moving slowly or restless 0   Q9: Thoughts of better off dead/self-harm past 2 weeks 0   PHQ-9 Total Score 0   Difficulty at work, home, or with people Not difficult at all       Suicide Assessment Five-step Evaluation and Treatment (SAFE-T)          Review of Systems         Objective    /78   Pulse 68   Temp 98.7  F (37.1  C) (Oral)   Resp 20   Ht 1.55 m (5' 1.02\")   Wt 56.2 kg (124 lb)   SpO2 98%   BMI 23.41 kg/m    Body mass index is 23.41 kg/m .  Physical Exam   GENERAL: healthy, alert and no distress  EYES: Eyes grossly normal to inspection, conjunctivae and sclerae normal  NECK: No asymmetry or scars  RESP: No increased work of breathing  CV: No cyanosis  MS: no gross musculoskeletal defects noted, no edema  SKIN: no suspicious lesions or rashes  PSYCH: mentation appears normal, affect normal/bright      ----- Service Performed and Documented by Resident or Fellow ------            .  ..  "

## 2022-07-22 ENCOUNTER — OFFICE VISIT (OUTPATIENT)
Dept: FAMILY MEDICINE | Facility: CLINIC | Age: 52
End: 2022-07-22
Payer: COMMERCIAL

## 2022-07-22 VITALS
SYSTOLIC BLOOD PRESSURE: 109 MMHG | HEIGHT: 61 IN | TEMPERATURE: 98.7 F | DIASTOLIC BLOOD PRESSURE: 78 MMHG | HEART RATE: 68 BPM | WEIGHT: 124 LBS | BODY MASS INDEX: 23.41 KG/M2 | RESPIRATION RATE: 20 BRPM | OXYGEN SATURATION: 98 %

## 2022-07-22 DIAGNOSIS — F33.1 MODERATE EPISODE OF RECURRENT MAJOR DEPRESSIVE DISORDER (H): Primary | ICD-10-CM

## 2022-07-22 PROCEDURE — T1013 SIGN LANG/ORAL INTERPRETER: HCPCS | Performed by: STUDENT IN AN ORGANIZED HEALTH CARE EDUCATION/TRAINING PROGRAM

## 2022-07-22 PROCEDURE — 99213 OFFICE O/P EST LOW 20 MIN: CPT | Mod: GC | Performed by: STUDENT IN AN ORGANIZED HEALTH CARE EDUCATION/TRAINING PROGRAM

## 2022-07-22 ASSESSMENT — PATIENT HEALTH QUESTIONNAIRE - PHQ9: SUM OF ALL RESPONSES TO PHQ QUESTIONS 1-9: 0

## 2022-07-22 NOTE — PROGRESS NOTES
Due to patient being non-English speaking/uses sign language, an  was used for this visit. Only for face-to-face interpretation by an external agency, date and length of interpretation can be found on the scanned worksheet.     name: Ирина  Agency: [employer]  Language: Yana   Telephone number: [phone]  Type of interpretation: Face-to-face, spoken

## 2022-07-22 NOTE — PROGRESS NOTES
Due to patient being non-English speaking/uses sign language, an  was used for this visit. Only for face-to-face interpretation by an external agency, date and length of interpretation can be found on the scanned worksheet.     name: [employer]   Agency: [employer]  Language: Yana   Telephone number: [phone]  Type of interpretation: Telephone, spoken

## 2022-07-26 NOTE — PROGRESS NOTES
Preceptor Attestation:  Patient's case reviewed and discussed with the resident, Sohail Hoffmann DO, and I personally evaluated the patient. I agree with written assessment and plan of care.    Supervising Physician:  Sandra Smith MD   [facility]

## 2022-07-29 ENCOUNTER — TELEPHONE (OUTPATIENT)
Dept: FAMILY MEDICINE | Facility: CLINIC | Age: 52
End: 2022-07-29

## 2022-07-29 NOTE — TELEPHONE ENCOUNTER
Clinic Care Coordination Contact  [facility]/Voicemail    [email or web address]  Clinical Data: Care Coordinator Outreach  Outreach attempted x 1 with .  Left message on patient's voicemail with call back information and requested return call.  Plan:  Care Coordinator will try to reach patient again in 10 business days.    NISHA VIZCAINO, FOUZIA, LADC

## 2022-08-04 ENCOUNTER — OFFICE VISIT (OUTPATIENT)
Dept: PHARMACY | Facility: CLINIC | Age: 52
End: 2022-08-04
Payer: COMMERCIAL

## 2022-08-04 ENCOUNTER — ALLIED HEALTH/NURSE VISIT (OUTPATIENT)
Dept: FAMILY MEDICINE | Facility: CLINIC | Age: 52
End: 2022-08-04
Payer: COMMERCIAL

## 2022-08-04 VITALS
HEIGHT: 61 IN | DIASTOLIC BLOOD PRESSURE: 78 MMHG | HEART RATE: 65 BPM | WEIGHT: 124 LBS | BODY MASS INDEX: 23.41 KG/M2 | OXYGEN SATURATION: 97 % | TEMPERATURE: 97.9 F | SYSTOLIC BLOOD PRESSURE: 115 MMHG

## 2022-08-04 DIAGNOSIS — F33.1 MODERATE EPISODE OF RECURRENT MAJOR DEPRESSIVE DISORDER (H): ICD-10-CM

## 2022-08-04 DIAGNOSIS — Z23 NEED FOR COVID-19 VACCINE: ICD-10-CM

## 2022-08-04 DIAGNOSIS — K51.311 ULCERATIVE RECTOSIGMOIDITIS WITH RECTAL BLEEDING (H): ICD-10-CM

## 2022-08-04 DIAGNOSIS — E55.9 VITAMIN D DEFICIENCY: Primary | ICD-10-CM

## 2022-08-04 PROCEDURE — 91305 PR COVID VAC PFIZER TRIS-SUCROSE 30MCG/0.3ML IM: CPT

## 2022-08-04 PROCEDURE — 99607 MTMS BY PHARM ADDL 15 MIN: CPT | Performed by: PHARMACIST

## 2022-08-04 PROCEDURE — 99207 PR NO CHARGE NURSE ONLY: CPT

## 2022-08-04 PROCEDURE — 0054A PR ADMIN COVID VAC PFIZER TRS-SUCR, BOOSTER: CPT

## 2022-08-04 PROCEDURE — 99605 MTMS BY PHARM NP 15 MIN: CPT | Performed by: PHARMACIST

## 2022-08-04 RX ORDER — BALSALAZIDE DISODIUM 750 MG/1
2250 CAPSULE ORAL 3 TIMES DAILY
Qty: 810 CAPSULE | Refills: 1 | Status: SHIPPED | OUTPATIENT
Start: 2022-08-04 | End: 2023-01-06

## 2022-08-04 RX ORDER — PAROXETINE 20 MG/1
20 TABLET, FILM COATED ORAL AT BEDTIME
Qty: 90 TABLET | Refills: 3 | Status: SHIPPED | OUTPATIENT
Start: 2022-08-04 | End: 2023-01-06

## 2022-08-04 RX ORDER — TRAZODONE HYDROCHLORIDE 50 MG/1
50 TABLET, FILM COATED ORAL
Qty: 90 TABLET | Refills: 3 | Status: SHIPPED | OUTPATIENT
Start: 2022-08-04 | End: 2023-01-06

## 2022-08-04 RX ORDER — MESALAMINE 1000 MG/1
1000 SUPPOSITORY RECTAL AT BEDTIME
Qty: 90 SUPPOSITORY | Refills: 1 | Status: SHIPPED | OUTPATIENT
Start: 2022-08-04 | End: 2023-01-06

## 2022-08-04 RX ORDER — BALSALAZIDE DISODIUM 750 MG/1
2250 CAPSULE ORAL 3 TIMES DAILY
Start: 2022-08-04 | End: 2022-08-04

## 2022-08-04 RX ORDER — ACETAMINOPHEN 160 MG
2000 TABLET,DISINTEGRATING ORAL DAILY
Qty: 90 CAPSULE | Refills: 3 | Status: SHIPPED | OUTPATIENT
Start: 2022-08-04 | End: 2023-01-06

## 2022-08-04 RX ORDER — ARIPIPRAZOLE 5 MG/1
5 TABLET ORAL DAILY
Qty: 90 TABLET | Refills: 3 | Status: SHIPPED | OUTPATIENT
Start: 2022-08-04 | End: 2023-01-06

## 2022-08-04 ASSESSMENT — ACTIVITIES OF DAILY LIVING (ADL): DEPENDENT_IADLS:: INDEPENDENT

## 2022-08-04 NOTE — Clinical Note
Hi team,  Can you call patient and relay the following message:  Grant White,  After reviewing the records from your previous GI specialist, I recommend you begin using the suppositories nightly rather than just as needed. Based on their last prescription, this is the dosing they recommended. Again, please use the suppositories every day, even if you don't have concern for symptoms hard bowel movements or bloody stools.  Christal Patel

## 2022-08-04 NOTE — PATIENT INSTRUCTIONS
Patient to  medications from the pharmacy.    Patient to take medications as prescribed.     Patient to continue therapy as scheduled.    Patient and SW to work to establish psychiatry for patient.

## 2022-08-04 NOTE — PROGRESS NOTES
Clinic Care Coordination Contact    Follow Up Progress Note      Assessment:     SW met with patient and in-person  this date at [facility] appointment at [facility].    Patient presented to appointment this date:    Appearance: well-groomed;   Speech: normal, non-pressured; not slurred  Emotion: stable, positive  Perception: no hallucinations or dissociation reported by patient  Thought Content: no suicidal or homicidal ideation or intent reported by patient this date,  Insight & Judgement: intact  Cognition: oriented x3; memory intact short-term and long-term    Patient reported continued interest in psychiatry referral. Patient reported interest in [facility] this date and verbally authorized referral to [facility] this date. SW to call patient in one week to confirm whether patient has been called and scheduled for initial psychiatry appointment. Patient vocalized understanding this date,.      Care Gaps:    Health Maintenance Due   Topic Date Due     PREVENTIVE CARE VISIT  Never done     ADVANCE CARE PLANNING  Never done     DEPRESSION ACTION PLAN  Never done     ZOSTER IMMUNIZATION (1 of 2) Never done     PAP  Never done     COVID-19 Vaccine (3 - Moderna risk series) 02/08/2022       Goals addressed this encounter:    Goals Addressed                    This Visit's Progress       Mental Health Management (pt-stated)   On track      Goal Statement: I will get scheduled for a new psychiatrist.  Date Goal set: 8/4/22  Barriers: Non-english speaking  Strengths: Had psychiatrist previously  Date to Achieve By: Earliest availability  Patient expressed understanding of goal: Yes.  Action steps to achieve this goal:  1. I will discuss referral options with clinic SW and submit referral for psychiatry.  2. I will schedule initial psychiatry appointment.  3. I will inform PCP/SW of initial psychiatry appointment date.                Intervention/Education provided during outreach: Mental health referral.      Plan:    Patient and SW to follow-up in one week regarding whether patient has been contacted and scheduled for initial appointment.    Care Coordinator will follow up in one week.     NISHA VIZCAINO, FOUZIA, TANNERC

## 2022-08-04 NOTE — PROGRESS NOTES
Medication Therapy Management (MTM) Encounter    ASSESSMENT:                            Medication Adherence/Access: See below for considerations    Depression: Well controlled based on recent PHQ9, patient declined repeating today. Long history of depression based on chart review. History of SI attempt in 2009, other hospitalization in 2018. History of using Mirtazapine, Fluoxetine. Current regimen anticipate is from psychiatry provider. While Paroxetine maybe not ideal given anticholinergic, weight gain potential anticipate that this is due to previous trials of alternative agents. Working to reestablish with psychiatrist, change necessitated by insurance change. Need to allow continued access to medicines to prevent gap in interim of reestablishing care.     Ulcerative rectosigmoiditis without complication: Last visit with specialist 2/5/2020. Plan at that time was to continue Colazal, stop enemas after 1 month and return to clinic in 6 months. Dose of Colazal matches documentation in Care Everywhere. Overdue for follow up. Call today from referral coordinator indicates that her insurance also not accepted at previous specialist. Need to re-refer. Only record of suppository dosing on order as scheduled rather than as needed.     mesalamine (CANASA) 1000 MG suppository   Insert 1 Suppository rectally daily at bedtime.     Vitamin D: Stable. In future could consider lab draw to determine efficacy of medicine.      Rash: Not addressed in great detail today due to time, need to reassess.     PLAN:                            Due for COVID booster, patient agreeable to this.   Move the Paroxetine pill to bedtime to see if this helps with drowsiness during the day  Refills of all medicines today.   Educate patient on use of mesalamine suppositories daily  Referral placed to establish care with new GI specialist  Connected patient to  referral coordinator to help get set up with new psychiatrist    Follow-up: Return in  about 6 weeks (around 9/15/2022).    SUBJECTIVE/OBJECTIVE:                          Cindy Smith is a 52 year old female coming in for an initial visit. She was referred to me from Dr. Hoffmann.  (ID# Long Padilla) was used during today's visit.      Reason for visit: medication reconciliation / medication access.    Allergies/ADRs: Reviewed in chart  Past Medical History: Reviewed in chart  Social History     Tobacco Use     Smoking status: Never Smoker     Smokeless tobacco: Never Used   Substance Use Topics     Alcohol use: Yes     Comment: rarely     Drug use: No     ^Reviewed today    Medication Adherence/Access: Needing refills on all medicines. Prefers [facility]. No concerns for copayments at pharmacy. Son sets up in pillbox and has alarms on cell phone and  helps provide reminders.     Depression: Abilify 5 mg daily, Paroxetine 20 mg daily, Trazodone at bedtime as needed for sleep. Some concern for drowsiness during the day time. No concerns for dry mouth, dry eyes. Margarito or hot days then dizziness/lightheadedness, otherwise no concerns.     Used to see Dr. Carcamo however now due to insurance difficulty, unable to see this provider any further. Dr. Carcamo is a psychiatrist. Thought today she was meeting with a psychiatrist.     Continues to see [facility] therapist; ongoing for last 3-4 years seeing once every 2 months.     Was unaware that clinic has been working to reach out to patient regarding new referral placed by Dr. Hoffmann.     PHQ 5/5/2022 7/22/2022   PHQ-9 Total Score 16 0   Q9: Thoughts of better off dead/self-harm past 2 weeks Not at all Not at all       Ulcerative rectosigmoiditis without complication: Balsalazide 3 capsules three times daily and Mesalamine suppository just as needed when see bloody stool or hard bowel movement. Estimates that range of use for the suppositories is at most 2-3 times per week to none at all. Last saw specialist 2/2020, just before COVID. History  "provided by patient was that she was having bloody stools, had colonoscopy, then received this diagnosis \"inflammation in colon\". Hasn't received suppositories lately, current supply depleted. No troubles any longer with swallowing tablets.     Vitamin D: Has been prescribed in the past. Taking 2000 units daily.     Rash: Taking some medicine to help with itchiness/rash. Unclear of name, not taking any topical products.     Today's Vitals: /78 (BP Location: Right arm, Patient Position: Sitting, Cuff Size: Adult Regular)   Pulse 65   Temp 97.9  F (36.6  C) (Oral)   Ht 5' 1\" (1.549 m)   Wt 124 lb (56.2 kg)   SpO2 97%   BMI 23.43 kg/m    ----------------      I spent 60 minutes with this patient today. All changes were made via collaborative practice agreement with Dr. Cooper. A copy of the visit note was provided to the patient's provider(s).    The patient was given a summary of these recommendations.     Christal Ellsworth, Pharm.D., CDCES [facility]  Phone: [phone]    For insurance/tracking purposes, [facility] Team Documentation:   Medication Therapy Recommendations  Moderate episode of recurrent major depressive disorder (H)    Current Medication: PARoxetine (PAXIL) 20 MG tablet   Rationale: Undesirable effect - Adverse medication event - Safety   Recommendation: Change Administration Time   Status: Patient Agreed - Adherence/Education         Need for COVID-19 vaccine    Rationale: Preventive therapy - Needs additional medication therapy - Indication   Recommendation: Order Vaccine   Status: Accepted per CPA         Ulcerative rectosigmoiditis with rectal bleeding (H)    Current Medication: mesalamine (CANASA) 1000 MG suppository   Rationale: Does not understand instructions - Adherence - Adherence   Recommendation: Provide Education   Status: Patient Agreed - Adherence/Education                "

## 2022-08-04 NOTE — PATIENT INSTRUCTIONS
Recommendations from today's visit:                                                      Move the Paroxetine pill to bedtime to see if this helps with drowsiness during the day      It was great to speak with you today!    I value your experience. You may receive a survey via email or text message in the next few days. I would be very thankful for your time in providing feedback.    Pharmacist's contact information:                                                      Please feel free to contact me with any questions or concerns you have.     Christal Ellsworth  Pharmacist, Certified Diabetes Care and   [facility]  Phone: [phone]  August 4, 2022 at 2:47 PM

## 2022-08-30 ENCOUNTER — OFFICE VISIT (OUTPATIENT)
Dept: FAMILY MEDICINE | Facility: CLINIC | Age: 52
End: 2022-08-30
Payer: COMMERCIAL

## 2022-08-30 VITALS
DIASTOLIC BLOOD PRESSURE: 76 MMHG | HEART RATE: 68 BPM | WEIGHT: 126 LBS | HEIGHT: 60 IN | OXYGEN SATURATION: 98 % | TEMPERATURE: 98.6 F | SYSTOLIC BLOOD PRESSURE: 122 MMHG | BODY MASS INDEX: 24.74 KG/M2 | RESPIRATION RATE: 20 BRPM

## 2022-08-30 DIAGNOSIS — Z12.4 CERVICAL CANCER SCREENING: ICD-10-CM

## 2022-08-30 DIAGNOSIS — K62.89 PROCTITIS: ICD-10-CM

## 2022-08-30 DIAGNOSIS — R21 RASH AND NONSPECIFIC SKIN ERUPTION: ICD-10-CM

## 2022-08-30 DIAGNOSIS — E55.9 VITAMIN D DEFICIENCY: ICD-10-CM

## 2022-08-30 DIAGNOSIS — F33.41 RECURRENT MAJOR DEPRESSIVE DISORDER, IN PARTIAL REMISSION (H): ICD-10-CM

## 2022-08-30 DIAGNOSIS — Z00.00 ROUTINE GENERAL MEDICAL EXAMINATION AT A HEALTH CARE FACILITY: Primary | ICD-10-CM

## 2022-08-30 LAB
ANION GAP SERPL CALCULATED.3IONS-SCNC: 10 MMOL/L (ref 7–15)
BUN SERPL-MCNC: 18.1 MG/DL (ref 6–20)
CALCIUM SERPL-MCNC: 9.4 MG/DL (ref 8.6–10)
CHLORIDE SERPL-SCNC: 101 MMOL/L (ref 98–107)
CREAT SERPL-MCNC: 0.63 MG/DL (ref 0.51–0.95)
DEPRECATED HCO3 PLAS-SCNC: 25 MMOL/L (ref 22–29)
GFR SERPL CREATININE-BSD FRML MDRD: >90 ML/MIN/1.73M2
GLUCOSE SERPL-MCNC: 105 MG/DL (ref 70–99)
POTASSIUM SERPL-SCNC: 4.6 MMOL/L (ref 3.4–5.3)
SODIUM SERPL-SCNC: 136 MMOL/L (ref 136–145)

## 2022-08-30 PROCEDURE — 87624 HPV HI-RISK TYP POOLED RSLT: CPT | Performed by: FAMILY MEDICINE

## 2022-08-30 PROCEDURE — 36415 COLL VENOUS BLD VENIPUNCTURE: CPT | Performed by: FAMILY MEDICINE

## 2022-08-30 PROCEDURE — 82306 VITAMIN D 25 HYDROXY: CPT | Performed by: FAMILY MEDICINE

## 2022-08-30 PROCEDURE — G0145 SCR C/V CYTO,THINLAYER,RESCR: HCPCS | Performed by: FAMILY MEDICINE

## 2022-08-30 PROCEDURE — 80048 BASIC METABOLIC PNL TOTAL CA: CPT | Performed by: FAMILY MEDICINE

## 2022-08-30 PROCEDURE — 99396 PREV VISIT EST AGE 40-64: CPT | Performed by: FAMILY MEDICINE

## 2022-08-30 RX ORDER — BENZOCAINE/MENTHOL 6 MG-10 MG
LOZENGE MUCOUS MEMBRANE 2 TIMES DAILY
Qty: 60 G | Refills: 1 | Status: SHIPPED | OUTPATIENT
Start: 2022-08-30 | End: 2022-12-15

## 2022-08-30 ASSESSMENT — ENCOUNTER SYMPTOMS
ABDOMINAL PAIN: 0
WEAKNESS: 0
DYSURIA: 0
HEADACHES: 0
FREQUENCY: 0
SORE THROAT: 0
DIARRHEA: 0
PALPITATIONS: 0
NAUSEA: 0
PARESTHESIAS: 0
DIZZINESS: 0
EYE PAIN: 0
HEMATURIA: 0
MYALGIAS: 0
CONSTIPATION: 0
NERVOUS/ANXIOUS: 0
FEVER: 0
SHORTNESS OF BREATH: 0
ARTHRALGIAS: 0
COUGH: 0
CHILLS: 0
JOINT SWELLING: 0
HEARTBURN: 0
HEMATOCHEZIA: 0

## 2022-08-30 ASSESSMENT — PATIENT HEALTH QUESTIONNAIRE - PHQ9: SUM OF ALL RESPONSES TO PHQ QUESTIONS 1-9: 5

## 2022-08-30 NOTE — PROGRESS NOTES
[   SUBJECTIVE:   CC: Cindy Smith is an 52 year old woman who presents for preventive health visit.         Healthy Habits:     Getting at least 3 servings of Calcium per day:  Yes    Bi-annual eye exam:  NO    Dental care twice a year:  NO    Sleep apnea or symptoms of sleep apnea:  None    Diet:  Regular (no restrictions)    Frequency of exercise:  2-3 days/week    Duration of exercise:  Less than 15 minutes    Taking medications regularly:  Yes    Medication side effects:  Not applicable    PHQ-2 Total Score: 2    Additional concerns today:  No      Today's PHQ-2 Score:   PHQ-2 ( 1999 Pfizer) 8/30/2022   Q1: Little interest or pleasure in doing things 1   Q2: Feeling down, depressed or hopeless 1   PHQ-2 Score 2   Q1: Little interest or pleasure in doing things Several days   Q2: Feeling down, depressed or hopeless Several days   PHQ-2 Score 2       Social History     Tobacco Use     Smoking status: Never Smoker     Smokeless tobacco: Never Used   Substance Use Topics     Alcohol use: Not Currently     Comment: rarely         Alcohol Use 8/30/2022   Prescreen: >3 drinks/day or >7 drinks/week? No       Reviewed orders with patient.  Reviewed health maintenance and updated orders accordingly - Yes      Breast Cancer Screening:  Any new diagnosis of family breast, ovarian, or bowel cancer? No   Had a normal mammogram 6/2022      History of abnormal Pap smear: NO - age 30-65 PAP every 5 years with negative HPV co-testing recommended  No abnormal pap tests. LMP started yesterday 3 days ago, still gets monthly, bleeds      Reviewed and updated as needed this visit by clinical staff   Tobacco  Allergies  Meds   Med Hx  Surg Hx  Fam Hx  Soc Hx          Reviewed and updated as needed this visit by Provider    Met with PharmD earlier this month. She has had an ongoing rash that is improved, but still has a few itchy spots. She is requesting a cream because the oral medication is making her drowsy.    Depression:  PHQ  5/5/2022 7/22/2022 8/30/2022   PHQ-9 Total Score 16 0 5   Q9: Thoughts of better off dead/self-harm past 2 weeks Not at all Not at all Not at all     She moved her paroxetine to the evening and this helped with her sleepiness. She feels it is working well. She thinks she has an appointment scheduled for mental health in the next 1-2 weeks.    She has an appointment for GI on 9-1-2022                       Review of Systems   Constitutional: Negative for chills and fever.   HENT: Negative for congestion, ear pain, hearing loss and sore throat.    Eyes: Negative for pain and visual disturbance.   Respiratory: Negative for cough and shortness of breath.    Cardiovascular: Negative for chest pain, palpitations and peripheral edema.   Gastrointestinal: Negative for abdominal pain, constipation, diarrhea, heartburn, hematochezia and nausea.   Genitourinary: Negative for dysuria, frequency, genital sores, hematuria and urgency.   Musculoskeletal: Negative for arthralgias, joint swelling and myalgias.   Skin: Negative for rash.   Neurological: Negative for dizziness, weakness, headaches and paresthesias.   Psychiatric/Behavioral: Negative for mood changes. The patient is not nervous/anxious.           OBJECTIVE:   /76   Pulse 68   Temp 98.6  F (37  C) (Oral)   Resp 20   Ht 1.524 m (5')   Wt 57.2 kg (126 lb)   SpO2 98%   BMI 24.61 kg/m    Physical Exam  GENERAL: healthy, alert and no distress  EYES: Eyes grossly normal to inspection, PERRL and conjunctivae and sclerae normal  HENT: ear canals and TM's normal, nose and mouth without ulcers or lesions  NECK: no adenopathy, no asymmetry, masses, or scars and thyroid normal to palpation  RESP: lungs clear to auscultation - no rales, rhonchi or wheezes  CV: regular rate and rhythm, normal S1 S2, no S3 or S4, no murmur, click or rub, no peripheral edema and peripheral pulses strong  ABDOMEN: soft, nontender, no hepatosplenomegaly, no masses and bowel sounds normal    (female): normal female external genitalia, normal urethral meatus, vaginal mucosa pink, moist, well rugated, and normal cervix/adnexa/uterus without masses or discharge  MS: no gross musculoskeletal defects noted, no edema  SKIN: few small macule lesions on abdomen and low back  NEURO: Normal strength and tone, mentation intact and speech normal  PSYCH: mentation appears normal, affect normal/bright        ASSESSMENT/PLAN:     (Z00.00) Routine general medical examination at a health care facility  (primary encounter diagnosis)  Comment: Discussed following up with specialists, continue a healthy diet and up to date with mammogram  Plan: Pap test today    (Z12.4) Cervical cancer screening  No history of abnormal pap test  Plan: Gynecologic Cytology (PAP)    (E55.9) Vitamin D deficiency  Comment: Recommended from last appointment with PharmD  Plan: Vitamin D deficiency screening      (F33.41) Recurrent major depressive disorder, in partial remission (H)  Comment: Doing well on Prozac, she thinks she has an appointment with mental health provider in 1-2 weeks, but she needs to check with her   Plan: Basic metabolic panel          (K62.89) Proctitis  Comment: Seems to be stable at this time  Plan: Follow-up with GI on 9-1-2022    (R21) Rash- cream for itchy areas        COUNSELING:  Reviewed preventive health counseling, as reflected in patient instructions       Regular exercise       Healthy diet/nutrition    Estimated body mass index is 24.61 kg/m  as calculated from the following:    Height as of this encounter: 1.524 m (5').    Weight as of this encounter: 57.2 kg (126 lb).        She reports that she has never smoked. She has never used smokeless tobacco.      Counseling Resources:  ATP IV Guidelines  Pooled Cohorts Equation Calculator  Breast Cancer Risk Calculator  BRCA-Related Cancer Risk Assessment: FHS-7 Tool  FRAX Risk Assessment  ICSI Preventive Guidelines  Dietary Guidelines for Americans,  2010  "[employer]'s MyPlate  ASA Prophylaxis  Lung CA Screening    Milli Cooper DO  [facility]ue to patient being non-English speaking/uses sign language, an  was used for this visit. Only for face-to-face interpretation by an external agency, date and length of interpretation can be found on the scanned worksheet.     name: ABHIJEET Line  Agency: [employer]  Language: Yana   Telephone number: [phone]  Type of interpretation: Telephone, spoken    Answers for HPI/ROS submitted by the patient on 8/30/2022  Frequency of exercise:: 2-3 days/week  Getting at least 3 servings of Calcium per day:: Yes  Diet:: Regular (no restrictions)  Taking medications regularly:: Yes  Medication side effects:: Not applicable  Bi-annual eye exam:: NO  Dental care twice a year:: NO  Sleep apnea or symptoms of sleep apnea:: None  abdominal pain: No  Blood in stool: No  Blood in urine: No  chest pain: No  chills: No  congestion: No  constipation: No  cough: No  diarrhea: No  dizziness: No  ear pain: No  eye pain: No  nervous/anxious: No  fever: No  frequency: No  genital sores: No  headaches: No  hearing loss: No  heartburn: No  arthralgias: No  joint swelling: No  peripheral edema: No  mood changes: No  myalgias: No  nausea: No  dysuria: No  palpitations: No  Skin sensation changes: No  sore throat: No  urgency: No  rash: No  shortness of breath: No  visual disturbance: No  weakness: No  Additional concerns today:: No  Duration of exercise:: Less than 15 minutes

## 2022-08-30 NOTE — LETTER
September 20, 2022      Cindy Smith  [address] [place] [address]        Dear ,    We are writing to inform you of your test results.        Resulted Orders   Gynecologic Cytology (PAP)   Result Value Ref Range    Interpretation        Negative for Intraepithelial Lesion or Malignancy (NILM)    Comment         Papanicolaou Test Limitations:  Cervical cytology is a screening test with limited sensitivity, and regular screening is critical for cancer prevention.  Pap tests are primarily effective for the diagnosis/prevention of squamous cell carcinoma, not adenocarcinoma or other cancers.        Specimen Adequacy       Satisfactory for evaluation, endocervical/transformation zone component present    Clinical Information       none      LMP/Menopause Date       8/26/2022      Reflex Testing Yes regardless of result     Previous Abnormal?       No      Performing Labs       The technical component of this testing was completed at [facility]     Vitamin D deficiency screening   Result Value Ref Range    Vitamin D, Total (25-Hydroxy) 30 20 - 75 ug/L    Narrative    Season, race, dietary intake, and treatment affect the concentration of 25-hydroxy-Vitamin D. Values may decrease during winter months and increase during summer months. Values 20-29 ug/L may indicate Vitamin D insufficiency and values <20 ug/L may indicate Vitamin D deficiency.    Vitamin D determination is routinely performed by an immunoassay specific for 25 hydroxyvitamin D3.  If an individual is on vitamin D2(ergocalciferol) supplementation, please specify 25 OH vitamin D2 and D3 level determination by LCMSMS test VITD23.     Basic metabolic panel   Result Value Ref Range    Creatinine 0.63 0.51 - 0.95 mg/dL    Sodium 136 136 - 145 mmol/L    Potassium 4.6 3.4 - 5.3 mmol/L    Urea Nitrogen 18.1 6.0 - 20.0 mg/dL    Chloride 101 98 - 107 mmol/L    Carbon Dioxide (CO2) 25 22 - 29 mmol/L     Anion Gap 10 7 - 15 mmol/L    Glucose 105 (H) 70 - 99 mg/dL    GFR Estimate >90 >60 mL/min/1.73m2      Comment:      Effective December 21, 2021 eGFRcr in adults is calculated using the 2021 CKD-EPI creatinine equation which includes age and gender (Montez gibbs al., [place], DOI: 10.1056/HSMRrj3228702)    Calcium 9.4 8.6 - 10.0 mg/dL   HPV High Risk Types DNA Cervical   Result Value Ref Range    Other HR HPV Negative Negative    HPV16 DNA Negative Negative    HPV18 DNA Negative Negative    FINAL DIAGNOSIS       This patient's sample is negative for HPV DNA.        This test was developed and its performance characteristics determined by the [facility]. It has not been cleared or approved by the FDA. The laboratory is regulated under CLIA as qualified to perform high-complexity testing. This test is used for clinical purposes. It should not be regarded as investigational or for research.    METHODOLOGY: The Roche Kayley 4800 system uses automated extraction, simultaneous amplification of HPV (L1 region) and beta-globin, followed by real time detection of fluorescent labeled HPV and beta globin using specific oligonucleotide probes. The test specifically identified types HPV 16 DNA and HPV 18 DNA while concurrently detecting the rest of the high risk types (31, 33, 35, 39, 45, 51, 52, 56, 58, 59, 66 or 68).    COMMENTS: This test is not intended for use as a screening device for woman under age 30 with normal cervical cytology. Results should be correlated with cytologic and histologic findings. Close clinical followup is recommended.           If you have any questions or concerns, please call the clinic at the number listed above.       Sincerely,      Milli Cooper, DO

## 2022-08-30 NOTE — PROGRESS NOTES
Due for COVID booster, patient agreeable to this.   Move the Paroxetine pill to bedtime to see if this helps with drowsiness during the day  Refills of all medicines today.   Educate patient on use of mesalamine suppositories daily  Referral placed to establish care with new GI specialist  Connected patient to  referral coordinator to help get set up with new psychiatrist    Rash and vitamin D

## 2022-08-30 NOTE — PATIENT INSTRUCTIONS
Please make sure you have an appointment with a mental health provider. If not, please contact us so we can help arrange.      Preventive Health Recommendations  Female Ages 50 - 64    Yearly exam: See your health care provider every year in order to  Review health changes.   Discuss preventive care.    Review your medicines if your doctor has prescribed any.    Get a Pap test every three years (unless you have an abnormal result and your provider advises testing more often).  If you get Pap tests with HPV test, you only need to test every 5 years, unless you have an abnormal result.   You do not need a Pap test if your uterus was removed (hysterectomy) and you have not had cancer.  You should be tested each year for STDs (sexually transmitted diseases) if you're at risk.   Have a mammogram every 1 to 2 years.  Have a colonoscopy at age 50, or have a yearly FIT test (stool test). These exams screen for colon cancer.    Have a cholesterol test every 5 years, or more often if advised.  Have a diabetes test (fasting glucose) every three years. If you are at risk for diabetes, you should have this test more often.   If you are at risk for osteoporosis (brittle bone disease), think about having a bone density scan (DEXA).    Shots: Get a flu shot each year. Get a tetanus shot every 10 years.    Nutrition:   Eat at least 5 servings of fruits and vegetables each day.  Eat whole-grain bread, whole-wheat pasta and brown rice instead of white grains and rice.  Get adequate Calcium and Vitamin D.     Lifestyle  Exercise at least 150 minutes a week (30 minutes a day, 5 days a week). This will help you control your weight and prevent disease.  Limit alcohol to one drink per day.  No smoking.   Wear sunscreen to prevent skin cancer.   See your dentist every six months for an exam and cleaning.  See your eye doctor every 1 to 2 years.

## 2022-08-30 NOTE — PROGRESS NOTES
SUBJECTIVE:   CC: Cindy Smith is an 52 year old woman who presents for preventive health visit.     {Split Bill scripting  The purpose of this visit is to discuss your medical history and prevent health problems before you are sick. You may be responsible for a co-pay, coinsurance, or deductible if your visit today includes services such as checking on a sore throat, having an x-ray or lab test, or treating and evaluating a new or existing condition :691081}  {Patient advised of split billing (Optional):655502}  Healthy Habits:     Getting at least 3 servings of Calcium per day:  Yes    Bi-annual eye exam:  NO    Dental care twice a year:  NO    Sleep apnea or symptoms of sleep apnea:  None    Diet:  Regular (no restrictions)    Frequency of exercise:  2-3 days/week    Duration of exercise:  Less than 15 minutes    Taking medications regularly:  Yes    Medication side effects:  Not applicable    PHQ-2 Total Score: 2    Additional concerns today:  No    {Add if <65 person on Medicare  - Required Questions (Optional):374466}  {Outside tests to abstract? :239028}    {additional problems to add (Optional):802109}    Today's PHQ-2 Score:   PHQ-2 ( 1999 Pfizer) 8/30/2022   Q1: Little interest or pleasure in doing things 1   Q2: Feeling down, depressed or hopeless 1   PHQ-2 Score 2   Q1: Little interest or pleasure in doing things Several days   Q2: Feeling down, depressed or hopeless Several days   PHQ-2 Score 2       Abuse: Current or Past (Physical, Sexual or Emotional) - { :663095}  Do you feel safe in your environment? { :079837}    Have you ever done Advance Care Planning? (For example, a Health Directive, POLST, or a discussion with a medical provider or your loved ones about your wishes): { :744350}    Social History     Tobacco Use     Smoking status: Never Smoker     Smokeless tobacco: Never Used   Substance Use Topics     Alcohol use: Yes     Comment: rarely     {Rooming Staff- Complete this question if  "Prescreen response is not shown below for today's visit. If you drink alcohol do you typically have >3 drinks per day or >7 drinks per week? (Optional):259583}    Alcohol Use 8/30/2022   Prescreen: >3 drinks/day or >7 drinks/week? No   {add AUDIT responses (Optional) (A score of 7 for adult men is an indication of hazardous drinking; a score of 8 or more is an indication of an alcohol use disorder.  A score of 7 or more for adult women is an indication of hazardous drinking or an alchohol use disorder):836650}    Reviewed orders with patient.  Reviewed health maintenance and updated orders accordingly - { :148901::\"Yes\"}  {Chronicprobdata (optional):413944}    Breast Cancer Screening:  Any new diagnosis of family breast, ovarian, or bowel cancer? {Yes_Link to Screening / No:476580}    FHS-7: No flowsheet data found.  {If any of the questions to the BCRA (FHS-7) are answered yes, consider ordering referral for genetic counseling (Optional) :475762::\"click delete button to remove this line now\"}  {AMB Mammogram Decision Support (Optional) :827788}  Pertinent mammograms are reviewed under the imaging tab.    History of abnormal Pap smear: { :966808}     Reviewed and updated as needed this visit by clinical staff     Meds                Reviewed and updated as needed this visit by Provider                   {HISTORY OPTIONS (Optional):136847}    Review of Systems   Constitutional: Negative for chills and fever.   HENT: Negative for congestion, ear pain, hearing loss and sore throat.    Eyes: Negative for pain and visual disturbance.   Respiratory: Negative for cough and shortness of breath.    Cardiovascular: Negative for chest pain, palpitations and peripheral edema.   Gastrointestinal: Negative for abdominal pain, constipation, diarrhea, heartburn, hematochezia and nausea.   Genitourinary: Negative for dysuria, frequency, genital sores, hematuria and urgency.   Musculoskeletal: Negative for arthralgias, joint " "swelling and myalgias.   Skin: Negative for rash.   Neurological: Negative for dizziness, weakness, headaches and paresthesias.   Psychiatric/Behavioral: Negative for mood changes. The patient is not nervous/anxious.      {FEMALE ROS (Optional):393349}     OBJECTIVE:   /76   Pulse 68   Temp 98.6  F (37  C) (Oral)   Resp 20   Ht 1.524 m (5')   Wt 57.2 kg (126 lb)   SpO2 98%   BMI 24.61 kg/m    Physical Exam  {Exam Choices (Optional):715342}    {Diagnostic Test Results (Optional):879602::\"Diagnostic Test Results:\",\"Labs reviewed in Epic\"}    ASSESSMENT/PLAN:   {Diag Picklist:280118}    {Patient advised of split billing (Optional):491255}    COUNSELING:  {FEMALE COUNSELING MESSAGES:368820::\"Reviewed preventive health counseling, as reflected in patient instructions\"}    Estimated body mass index is 24.61 kg/m  as calculated from the following:    Height as of this encounter: 1.524 m (5').    Weight as of this encounter: 57.2 kg (126 lb).    {Weight Management Plan (ACO) Complete if BMI is abnormal-  Ages 18-64  BMI >24.9.  Age 65+ with BMI <23 or >30 (Optional):792104}    She reports that she has never smoked. She has never used smokeless tobacco.      Counseling Resources:  ATP IV Guidelines  Pooled Cohorts Equation Calculator  Breast Cancer Risk Calculator  BRCA-Related Cancer Risk Assessment: FHS-7 Tool  FRAX Risk Assessment  ICSI Preventive Guidelines  Dietary Guidelines for Americans, 2010  USDA's MyPlate  ASA Prophylaxis  Lung CA Screening    Milli Cooper DO  [facility]  "

## 2022-08-31 ENCOUNTER — DOCUMENTATION ONLY (OUTPATIENT)
Dept: CARE COORDINATION | Facility: CLINIC | Age: 52
End: 2022-08-31

## 2022-08-31 ENCOUNTER — PATIENT OUTREACH (OUTPATIENT)
Dept: CARE COORDINATION | Facility: CLINIC | Age: 52
End: 2022-08-31

## 2022-08-31 LAB — DEPRECATED CALCIDIOL+CALCIFEROL SERPL-MC: 30 UG/L (ref 20–75)

## 2022-08-31 NOTE — PROGRESS NOTES
DIONICIO faxed referral for psychiatry to [facility] this date as verbally authorized by patient this date.    NISHA VIZCAINO, JULESSW, LADC

## 2022-08-31 NOTE — PROGRESS NOTES
Clinic Care Coordination Contact    Follow Up Progress Note      Assessment:     SW called patient with  this date.    Patient reported doing well this date. SW explained referral options for psychiatry at this time. Patient reported wanting referral to [facility] this date and verbally authorized referral be sent to [facility] this date. SW provided patient phone number to [facility] this date. SW to call patient 9/6/22 to follow-up on referral. Patient vocalized understanding this date.    Care Gaps:    Health Maintenance Due   Topic Date Due     ADVANCE CARE PLANNING  Never done     DEPRESSION ACTION PLAN  Never done     ZOSTER IMMUNIZATION (1 of 2) Never done     PAP  Never done     INFLUENZA VACCINE (1) 09/01/2022       Care Plans      Intervention/Education provided during outreach: Referral made for psychiatry.    Plan:   Referral to be submitted to [facility]; D[facility]ICIO to call patient 9/6/22 to follow-up with patient by phone.    Care Coordinator will follow up 9/6/22.    NISHA VIZCAINO, FOUZIA, LADC

## 2022-09-01 ENCOUNTER — TRANSFERRED RECORDS (OUTPATIENT)
Dept: HEALTH INFORMATION MANAGEMENT | Facility: CLINIC | Age: 52
End: 2022-09-01

## 2022-09-01 LAB
ALT SERPL-CCNC: 21 IU/L (ref 0–32)
AST SERPL-CCNC: 34 IU/L (ref 0–40)
BKR LAB AP GYN ADEQUACY: NORMAL
BKR LAB AP GYN INTERPRETATION: NORMAL
BKR LAB AP HPV REFLEX: NORMAL
BKR LAB AP LMP: NORMAL
BKR LAB AP PREVIOUS ABNORMAL: NORMAL
CREATININE (EXTERNAL): 0.69 MG/DL (ref 0.57–1)
GFR ESTIMATED (EXTERNAL): 104 ML/MIN/1.73
GLUCOSE (EXTERNAL): 111 MG/DL (ref 65–99)
PATH REPORT.COMMENTS IMP SPEC: NORMAL
PATH REPORT.COMMENTS IMP SPEC: NORMAL
PATH REPORT.RELEVANT HX SPEC: NORMAL
POTASSIUM (EXTERNAL): 4.7 MMOL/L (ref 3.5–5.2)
TSH SERPL-ACNC: 1.26 (ref 0.45–4.5)

## 2022-09-06 LAB
HUMAN PAPILLOMA VIRUS 16 DNA: NEGATIVE
HUMAN PAPILLOMA VIRUS 18 DNA: NEGATIVE
HUMAN PAPILLOMA VIRUS FINAL DIAGNOSIS: NORMAL
HUMAN PAPILLOMA VIRUS OTHER HR: NEGATIVE

## 2022-09-14 ENCOUNTER — DOCUMENTATION ONLY (OUTPATIENT)
Dept: CARE COORDINATION | Facility: CLINIC | Age: 52
End: 2022-09-14

## 2022-09-14 DIAGNOSIS — F33.1 MODERATE EPISODE OF RECURRENT MAJOR DEPRESSIVE DISORDER (H): Primary | ICD-10-CM

## 2022-09-14 NOTE — PROGRESS NOTES
Clinic received fax stating patient has been [facility]. Call back number given is [phone]. Copy of letter received via fax this date to be scanned to patient chart.    NISHA VIZCAINO, FOUZIA, [facility]

## 2022-09-30 ENCOUNTER — VIRTUAL VISIT (OUTPATIENT)
Dept: PHARMACY | Facility: CLINIC | Age: 52
End: 2022-09-30
Payer: COMMERCIAL

## 2022-09-30 DIAGNOSIS — F33.1 MODERATE EPISODE OF RECURRENT MAJOR DEPRESSIVE DISORDER (H): ICD-10-CM

## 2022-09-30 DIAGNOSIS — K62.89 PROCTITIS: Primary | ICD-10-CM

## 2022-09-30 DIAGNOSIS — K51.311 ULCERATIVE RECTOSIGMOIDITIS WITH RECTAL BLEEDING (H): ICD-10-CM

## 2022-09-30 PROCEDURE — 99606 MTMS BY PHARM EST 15 MIN: CPT | Performed by: PHARMACIST

## 2022-09-30 NOTE — PROGRESS NOTES
Medication Therapy Management (MTM) Encounter    ASSESSMENT:                            Medication Adherence/Access: See below for considerations    Proctitis  Ulcerative rectosigmoiditis with rectal bleeding (H)  Controlled, taking as prescribed with upcoming GI appointment. Unclear barrier, Rx has refills.     Moderate episode of recurrent major depressive disorder (H)  Stable. Upcoming specialist appointment.       PLAN:                            Call to [facility] - Rx ready for   Encouraged to follow up with Dr. Cooper after specialist appointment to review findings and for vaccines  Encouraged patient to ask at upcoming appointment about prep instruction, patient agreeable    Follow-up: Return in about 4 weeks (around 10/28/2022) for with primary care provider; please refer to pharmacist as needed.    Medication issues to be addressed at a future visit       Vaccines - pt interested in flu. Should also address shingles, bivalent COVID booster, pneumonia (per care gaps, however unable to identify indication)    SUBJECTIVE/OBJECTIVE:                          Cindy Smith is a 52 year old female called for a follow-up visit.  (ID# Anni Her KTTS) was used during today's visit.  Today's visit is a follow-up MTM visit from 8/4/22     Reason for visit: follow up.    Allergies/ADRs: Reviewed in chart  Past Medical History: Reviewed in chart  Social History     Tobacco Use     Smoking status: Never Smoker     Smokeless tobacco: Never Used   Substance Use Topics     Alcohol use: Not Currently     Comment: rarely     Drug use: No     ^Reviewed today    Medication Adherence/Access: see below    Proctitis  Has GI appointment scheduled however far in the future, October 11th with [facility] and October 23rd is colonoscopy. Has not yet received medicines for colonoscopy. Was instructed that would receive list of colonoscopy prep medicines and instructions however has not yet received. Balsalazide 3  capsules three times daily. Has been out of the medicine for a couple days. Suppository 1 each night.     Moderate episode of recurrent major depressive disorder (H)  Moving the Paroxetine to nighttime has been helpful to decrease daytime tiredness. Continues on Abilify 5 mg/day and Trazodone as needed. Reports mood is good. Upcoming appointment with Joel Bryant at [facility] on 10/20/22 at 2pm. Thanks to  for help.    Today's Vitals:   BP Readings from Last 1 Encounters:   08/30/22 122/76     Pulse Readings from Last 1 Encounters:   08/30/22 68     Wt Readings from Last 1 Encounters:   08/30/22 126 lb (57.2 kg)     Ht Readings from Last 1 Encounters:   08/30/22 5' (1.524 m)     Estimated body mass index is 24.61 kg/m  as calculated from the following:    Height as of 8/30/22: 5' (1.524 m).    Weight as of 8/30/22: 126 lb (57.2 kg).    Temp Readings from Last 1 Encounters:   08/30/22 98.6  F (37  C) (Oral)       ----------------      I spent 18 minutes with this patient today. All changes were made via collaborative practice agreement with Dr. Cooper. A copy of the visit note was provided to the patient's provider(s).    The patient was mailed a summary of these recommendations.     Christal Ellsworth, Pharm.D., CDCES [facility]  Phone: [phone]    Telemedicine Visit Details  Type of service:  Telephone visit  Start Time: 10:41 AM  End Time: 10:59 AM  Originating Location (patient location): Home  Distant Location (provider location):  [facility]    For insurance/tracking purposes, MTM Team Documentation:   Medication Therapy Recommendations  No medication therapy recommendations to display

## 2022-10-11 ENCOUNTER — TRANSFERRED RECORDS (OUTPATIENT)
Dept: HEALTH INFORMATION MANAGEMENT | Facility: CLINIC | Age: 52
End: 2022-10-11

## 2022-10-17 ENCOUNTER — PATIENT OUTREACH (OUTPATIENT)
Dept: CARE COORDINATION | Facility: CLINIC | Age: 52
End: 2022-10-17

## 2022-10-17 NOTE — PROGRESS NOTES
Clinic Care Coordination Contact    Follow Up Progress Note      Assessment: I got a call from the pt, pt called to ask me to help setup a ride for her appointment on 10/20/2022 at 2:00pm with [facility]. I called [facility], and was able to setup the pt to take a taxi. They have not assigned a taxi company yet, will have to call back to see what taxi company will be taking the pt.     Care Gaps:    Health Maintenance Due   Topic Date Due     ADVANCE CARE PLANNING  Never done     DEPRESSION ACTION PLAN  Never done     Pneumococcal Vaccine: Pediatrics (0 to 5 Years) and At-Risk Patients (6 to 64 Years) (1 - PCV) Never done     ZOSTER IMMUNIZATION (1 of 2) Never done     HEPATITIS B IMMUNIZATION (2 of 3 - Hep B Twinrix 3-dose series) 06/20/2006     INFLUENZA VACCINE (1) 09/01/2022     COVID-19 Vaccine (4 - Booster) 09/29/2022           Care Plans      Intervention/Education provided during outreach:               Plan:     Care Coordinator will follow up in

## 2022-12-01 ENCOUNTER — TELEPHONE (OUTPATIENT)
Dept: FAMILY MEDICINE | Facility: CLINIC | Age: 52
End: 2022-12-01

## 2022-12-01 NOTE — TELEPHONE ENCOUNTER
[facility] [phone], gal said patient canceled her appointment. They do not call patient to reschedule, patient has to call them to reschedule.

## 2022-12-15 ENCOUNTER — OFFICE VISIT (OUTPATIENT)
Dept: FAMILY MEDICINE | Facility: CLINIC | Age: 52
End: 2022-12-15
Payer: COMMERCIAL

## 2022-12-15 ENCOUNTER — TELEPHONE (OUTPATIENT)
Dept: FAMILY MEDICINE | Facility: CLINIC | Age: 52
End: 2022-12-15

## 2022-12-15 VITALS
HEIGHT: 61 IN | WEIGHT: 127 LBS | TEMPERATURE: 98 F | OXYGEN SATURATION: 97 % | HEART RATE: 71 BPM | DIASTOLIC BLOOD PRESSURE: 77 MMHG | SYSTOLIC BLOOD PRESSURE: 127 MMHG | BODY MASS INDEX: 23.98 KG/M2 | RESPIRATION RATE: 20 BRPM

## 2022-12-15 DIAGNOSIS — L29.89 CHRONIC PRURITIC RASH IN ADULT: Primary | ICD-10-CM

## 2022-12-15 DIAGNOSIS — R00.2 PALPITATIONS: ICD-10-CM

## 2022-12-15 DIAGNOSIS — L29.89 CHRONIC PRURITIC RASH IN ADULT: ICD-10-CM

## 2022-12-15 LAB
ERYTHROCYTE [DISTWIDTH] IN BLOOD BY AUTOMATED COUNT: 12.2 % (ref 10–15)
HCT VFR BLD AUTO: 44.9 % (ref 35–47)
HGB BLD-MCNC: 14.6 G/DL (ref 11.7–15.7)
MCH RBC QN AUTO: 29.9 PG (ref 26.5–33)
MCHC RBC AUTO-ENTMCNC: 32.5 G/DL (ref 31.5–36.5)
MCV RBC AUTO: 92 FL (ref 78–100)
PLATELET # BLD AUTO: 199 10E3/UL (ref 150–450)
RBC # BLD AUTO: 4.89 10E6/UL (ref 3.8–5.2)
TSH SERPL DL<=0.005 MIU/L-ACNC: 1.07 UIU/ML (ref 0.3–4.2)
WBC # BLD AUTO: 5.1 10E3/UL (ref 4–11)

## 2022-12-15 PROCEDURE — 90682 RIV4 VACC RECOMBINANT DNA IM: CPT | Performed by: STUDENT IN AN ORGANIZED HEALTH CARE EDUCATION/TRAINING PROGRAM

## 2022-12-15 PROCEDURE — 99214 OFFICE O/P EST MOD 30 MIN: CPT | Mod: 25 | Performed by: STUDENT IN AN ORGANIZED HEALTH CARE EDUCATION/TRAINING PROGRAM

## 2022-12-15 PROCEDURE — 36415 COLL VENOUS BLD VENIPUNCTURE: CPT | Performed by: STUDENT IN AN ORGANIZED HEALTH CARE EDUCATION/TRAINING PROGRAM

## 2022-12-15 PROCEDURE — 85027 COMPLETE CBC AUTOMATED: CPT | Performed by: STUDENT IN AN ORGANIZED HEALTH CARE EDUCATION/TRAINING PROGRAM

## 2022-12-15 PROCEDURE — 90471 IMMUNIZATION ADMIN: CPT | Performed by: STUDENT IN AN ORGANIZED HEALTH CARE EDUCATION/TRAINING PROGRAM

## 2022-12-15 PROCEDURE — 84443 ASSAY THYROID STIM HORMONE: CPT | Performed by: STUDENT IN AN ORGANIZED HEALTH CARE EDUCATION/TRAINING PROGRAM

## 2022-12-15 RX ORDER — HYDROXYZINE HYDROCHLORIDE 25 MG/1
25 TABLET, FILM COATED ORAL 3 TIMES DAILY PRN
Qty: 90 TABLET | Refills: 1 | Status: SHIPPED | OUTPATIENT
Start: 2022-12-15 | End: 2022-12-15

## 2022-12-15 RX ORDER — HYDROXYZINE HYDROCHLORIDE 25 MG/1
25 TABLET, FILM COATED ORAL 3 TIMES DAILY PRN
Qty: 90 TABLET | Refills: 1 | Status: SHIPPED | OUTPATIENT
Start: 2022-12-15 | End: 2022-12-28 | Stop reason: SINTOL

## 2022-12-15 RX ORDER — TRIAMCINOLONE ACETONIDE 1 MG/G
CREAM TOPICAL 2 TIMES DAILY
Qty: 45 G | Refills: 1 | Status: SHIPPED | OUTPATIENT
Start: 2022-12-15 | End: 2024-01-29

## 2022-12-15 RX ORDER — TRIAMCINOLONE ACETONIDE 1 MG/G
CREAM TOPICAL 2 TIMES DAILY
Qty: 45 G | Refills: 1 | Status: SHIPPED | OUTPATIENT
Start: 2022-12-15 | End: 2022-12-15

## 2022-12-15 NOTE — LETTER
December 19, 2022      Cindy Smith  [address] [place] [address]        Dear ,    We are writing to inform you of your test results    Your labs from our visit show that your blood counts are normal and your thyroid function is also normal. We will continue our work up at your follow up visit.     Please reach out with questions.        Resulted Orders   CBC with platelets   Result Value Ref Range    WBC Count 5.1 4.0 - 11.0 10e3/uL    RBC Count 4.89 3.80 - 5.20 10e6/uL    Hemoglobin 14.6 11.7 - 15.7 g/dL    Hematocrit 44.9 35.0 - 47.0 %    MCV 92 78 - 100 fL    MCH 29.9 26.5 - 33.0 pg    MCHC 32.5 31.5 - 36.5 g/dL    RDW 12.2 10.0 - 15.0 %    Platelet Count 199 150 - 450 10e3/uL   TSH with free T4 reflex   Result Value Ref Range    TSH 1.07 0.30 - 4.20 uIU/mL       If you have any questions or concerns, please call the clinic at the number listed above.       Sincerely,      Jennifer Venegas MD

## 2022-12-15 NOTE — TELEPHONE ENCOUNTER
[facility] phone call message- medication clarification/question:    Full Medication Name:     triamcinolone (KENALOG) 0.1 % external cream    hydrOXYzine (ATARAX) 25 MG tablet     Dose:      Sig - Route: Apply topically 2 times daily - Topical   Sent to pharmacy as: Triamcinolone Acetonide 0.1 % External Cream (KENALOG)         Sig - Route: Take 1 tablet (25 mg) by mouth 3 times daily as needed for itching - Oral        Question:     Caller advise Dr. Venegas is not cover through [place]. Please sign and resend medication. Thank you .        Pharmacy confirmed as [facility] - [address]: Yes    OK to leave a message on voice mail? Yes    Primary language: ong      needed? Yes    Call taken on December 15, 2022 at 1:10 PM by Jessica Perez

## 2022-12-15 NOTE — PROGRESS NOTES
Assessment & Plan     Chronic pruritic rash in adult  Appears to be ezcematous. No clear etiology for contact dermatitis as it is on her extremities/trunk and axilla. Will try higher potency steroid and PRN hydroxyzine.  - triamcinolone (KENALOG) 0.1 % external cream  Dispense: 45 g; Refill: 1  - hydrOXYzine (ATARAX) 25 MG tablet  Dispense: 90 tablet; Refill: 1    Palpitations  Suspect withdrawal symptoms from PRN paxil. Normal vitals and exam today is reassuring. Will r/o thryoid dysfucntion/anemia with cbc and tsh. Could consider EKG at next visit if symptoms persist. Planning to resume daily paxil and follow up in 2 weeks. If symptoms resolve and patient continues to feel that mood is stable, could talk about taper off of paxil. Plan to assess PHQ-9 at next visit.  - CBC with platelets  - TSH with free T4 reflex  - CBC with platelets  - TSH with free T4 reflex      Return in about 2 weeks (around 12/29/2022).     Precepted with Dr. Villarreal.    Jennifer Venegas MD  [facility]    Vi White is a 52 year old presenting for the following health issues:  RECHECK (Rash and itching) and Heart racing during PM and feeling tired      HPI     Itchiness  Has had it in the past  Has not resolved  Tried hydrocortisone, did not help much.  Small area on belly, lichenified due to itching  Small flesh colored papules on belly as well.  Does move around to her back as well as extremities.   Started in May of last year.    On paxil and abilify. Only taking PRN. Has not been taking for about 1-2 months ago.  Only taking trazodone two times a week.    Having intermittent palpitations  Feels she can hear her heart beat/more noticeable heart rate.  Happening mainly in the evening or afternoon.  Happens around 4-5 pm  Has some chest pain associated. Pain is described as throbbing in her left chest.  Denies shortness of breath.  Started about a month ago.  No association with exercise/exertion.  "Does happen at rest as well.  Denies dizziness, lightheadedness, syncope/presyncope.  Resolves with sleep at 10 pm.    Does note some increased anxiety recently, especially with palpitations.    EKG in 2016 normal sinus rhythm     Review of Systems   Constitutional, HEENT, cardiovascular, pulmonary, gi and gu systems are negative, except as otherwise noted.      Objective    /77   Pulse 71   Temp 98  F (36.7  C)   Resp 20   Ht 1.549 m (5' 1\")   Wt 57.6 kg (127 lb)   LMP 12/02/2022   SpO2 97%   BMI 24.00 kg/m    Body mass index is 24 kg/m .  Physical Exam   GENERAL: healthy, alert and no distress  EYES: Eyes grossly normal to inspection, PERRL and conjunctivae and sclerae normal  NECK: no adenopathy, no asymmetry, masses, or scars and thyroid normal to palpation  RESP: lungs clear to auscultation - no rales, rhonchi or wheezes  CV: regular rate and rhythm, normal S1 S2, no S3 or S4, no murmur, click or rub, no peripheral edema and peripheral pulses strong  MS: no gross musculoskeletal defects noted, no edema  SKIN: lichenified patches with eczematous changes on trunk and in armpits.  PSYCH: mentation appears normal, affect normal/bright    CBC and TSH pending.          "

## 2022-12-27 ENCOUNTER — OFFICE VISIT (OUTPATIENT)
Dept: FAMILY MEDICINE | Facility: CLINIC | Age: 52
End: 2022-12-27
Payer: COMMERCIAL

## 2022-12-27 VITALS
BODY MASS INDEX: 24.73 KG/M2 | OXYGEN SATURATION: 98 % | WEIGHT: 131 LBS | DIASTOLIC BLOOD PRESSURE: 81 MMHG | SYSTOLIC BLOOD PRESSURE: 129 MMHG | HEIGHT: 61 IN | HEART RATE: 69 BPM | TEMPERATURE: 98.3 F | RESPIRATION RATE: 18 BRPM

## 2022-12-27 DIAGNOSIS — L29.89 CHRONIC PRURITIC RASH IN ADULT: ICD-10-CM

## 2022-12-27 DIAGNOSIS — R00.2 PALPITATIONS: Primary | ICD-10-CM

## 2022-12-27 PROCEDURE — 99213 OFFICE O/P EST LOW 20 MIN: CPT | Performed by: FAMILY MEDICINE

## 2022-12-27 RX ORDER — CETIRIZINE HYDROCHLORIDE 10 MG/1
10 TABLET ORAL DAILY
Qty: 30 TABLET | Refills: 2 | Status: SHIPPED | OUTPATIENT
Start: 2022-12-27 | End: 2023-01-06

## 2022-12-27 ASSESSMENT — PATIENT HEALTH QUESTIONNAIRE - PHQ9: SUM OF ALL RESPONSES TO PHQ QUESTIONS 1-9: 4

## 2022-12-27 NOTE — PROGRESS NOTES
"  Assessment & Plan     Palpitations  Review of workup from a few years ago. Discussed repeating a 7 day monitor, but she declined for now.  Will stop caffeinated coffee and try decaf and monitor for symptoms.    Chronic pruritic rash in adult  Stop hydroxyzine due to fatigue  - cetirizine (ZYRTEC) 10 MG tablet  Dispense: 30 tablet; Refill: 2  Consider punch biopsy of worst region in future if persistent                 No follow-ups on file.    Milli Cooper DO  [facility]    Vi White is a 52 year old, presenting for the following health issues:  Follow Up (2 week  for heart and rash. Heart is \"feeling the same\" the rash medication is not helpful ) and Medication Reconciliation (Reports medicine she has been taking is making her sleepy and not helping )      HPI     1. Palpitations: Has been ongoing for her and in 1/2021 had a workup for palpitations that included a holter monitor that was normal per patient report. She is on her selective serotonin reuptake inhibitor consistently the past 2 weeks and has not made a difference in how she feels.   Labs from last visit: TSH normal, CBC: normal and were discussed with patient. She does drink caffeine via coffee with cream and discussed this could contribute.    2. Rash: Improved from last visit. The medication provided made her very sleepy      Objective    /81   Pulse 69   Temp 98.3  F (36.8  C)   Resp 18   Ht 1.549 m (5' 1\")   Wt 59.4 kg (131 lb)   LMP 12/02/2022   SpO2 98%   BMI 24.75 kg/m    Body mass index is 24.75 kg/m .  Physical Exam   GENERAL: healthy, alert and no distress  RESP: lungs clear to auscultation - no rales, rhonchi or wheezes  CV: regular rate and rhythm, normal S1 S2, no S3 or S4, no murmur, click or rub, no peripheral edema and peripheral pulses strong  MS: no gross musculoskeletal defects noted, no edema  SKIN: no suspicious lesions or rashes ( not noticeable today)                    "

## 2022-12-27 NOTE — PATIENT INSTRUCTIONS
Switch to decaffeinated coffee instead of regular coffee to improve your heart palpitations.

## 2022-12-29 NOTE — PROGRESS NOTES
Preceptor Attestation:   Patient seen, evaluated and discussed with the resident. I have verified the content of the note, which accurately reflects my assessment of the patient and the plan of care.    Supervising Physician:Saskia Villarreal MD    [facility]

## 2023-01-06 ENCOUNTER — TELEPHONE (OUTPATIENT)
Dept: FAMILY MEDICINE | Facility: CLINIC | Age: 53
End: 2023-01-06
Payer: COMMERCIAL

## 2023-01-06 DIAGNOSIS — F33.1 MODERATE EPISODE OF RECURRENT MAJOR DEPRESSIVE DISORDER (H): ICD-10-CM

## 2023-01-06 DIAGNOSIS — K51.311 ULCERATIVE RECTOSIGMOIDITIS WITH RECTAL BLEEDING (H): ICD-10-CM

## 2023-01-06 DIAGNOSIS — E55.9 VITAMIN D DEFICIENCY: ICD-10-CM

## 2023-01-06 DIAGNOSIS — L29.89 CHRONIC PRURITIC RASH IN ADULT: ICD-10-CM

## 2023-01-06 RX ORDER — MESALAMINE 1000 MG/1
1000 SUPPOSITORY RECTAL AT BEDTIME
Qty: 30 SUPPOSITORY | Refills: 0 | Status: SHIPPED | OUTPATIENT
Start: 2023-01-06 | End: 2023-05-02

## 2023-01-06 RX ORDER — CETIRIZINE HYDROCHLORIDE 10 MG/1
10 TABLET ORAL DAILY
Qty: 30 TABLET | Refills: 2 | Status: SHIPPED | OUTPATIENT
Start: 2023-01-06 | End: 2023-06-26

## 2023-01-06 RX ORDER — PAROXETINE 20 MG/1
20 TABLET, FILM COATED ORAL AT BEDTIME
Qty: 90 TABLET | Refills: 3 | Status: SHIPPED | OUTPATIENT
Start: 2023-01-06 | End: 2024-01-18

## 2023-01-06 RX ORDER — TRAZODONE HYDROCHLORIDE 50 MG/1
50 TABLET, FILM COATED ORAL
Qty: 90 TABLET | Refills: 3 | Status: SHIPPED | OUTPATIENT
Start: 2023-01-06

## 2023-01-06 RX ORDER — ACETAMINOPHEN 160 MG
2000 TABLET,DISINTEGRATING ORAL DAILY
Qty: 90 CAPSULE | Refills: 3 | Status: SHIPPED | OUTPATIENT
Start: 2023-01-06 | End: 2024-01-15

## 2023-01-06 RX ORDER — BALSALAZIDE DISODIUM 750 MG/1
2250 CAPSULE ORAL 3 TIMES DAILY
Qty: 270 CAPSULE | Refills: 0 | Status: SHIPPED | OUTPATIENT
Start: 2023-01-06 | End: 2023-06-20

## 2023-01-06 RX ORDER — ARIPIPRAZOLE 5 MG/1
5 TABLET ORAL DAILY
Qty: 90 TABLET | Refills: 3 | Status: SHIPPED | OUTPATIENT
Start: 2023-01-06

## 2023-01-06 NOTE — TELEPHONE ENCOUNTER
[facility] phone call message- medication clarification/question:    Full Medication Name: ALL MEDICATIONS    Question: Spouse called requesting if all medication can be sent over to new pharmacy - closer to home and easier for them to . If able please send all prescription to pharmacy listed thank you.    Pharmacy confirmed as [id number] - [place], [place] - [address] AT [facility]: Yes    OK to leave a message on voice mail? Yes    Primary language: Hmong      needed? Yes    Call taken on January 6, 2023 at 11:38 AM by [place]

## 2023-01-06 NOTE — TELEPHONE ENCOUNTER
Prescriptions have been sent in, but she needs to see her GI doctor to see if her medications (mesalamine and balsalazide) need to be adjusted and she is due for a colonoscopy.

## 2023-01-12 NOTE — TELEPHONE ENCOUNTER
Messaged relayed in person to spouse per . Spouse verbalized understanding and will inform patient. No other concerns or questions.

## 2023-02-04 NOTE — TELEPHONE ENCOUNTER
[facility] note from 10/2022, states to cancel the colonoscopy as was no longer needed since her symptoms are resolved.

## 2023-02-22 ENCOUNTER — PATIENT OUTREACH (OUTPATIENT)
Dept: CARE COORDINATION | Facility: CLINIC | Age: 53
End: 2023-02-22
Payer: COMMERCIAL

## 2023-02-22 NOTE — PROGRESS NOTES
Clinic Care Coordination Contact    Follow Up Progress Note      Assessment: I got a message from the  about the pt. Pt has an appointment on 02/27/2023 at 11:00am with [facility] and needs a ride. I called [facility] and was able to setup for the pt to ride with [employer]. [phone]    Care Gaps:    Health Maintenance Due   Topic Date Due     ADVANCE CARE PLANNING  Never done     DEPRESSION ACTION PLAN  Never done     Pneumococcal Vaccine: Pediatrics (0 to 5 Years) and At-Risk Patients (6 to 64 Years) (1 - PCV) Never done     ZOSTER IMMUNIZATION (1 of 2) Never done     HEPATITIS B IMMUNIZATION (2 of 3 - Hep B Twinrix 3-dose series) 06/20/2006     COVID-19 Vaccine (4 - Booster) 09/29/2022           Care Plans      Intervention/Education provided during outreach:               Plan:     Care Coordinator will follow up in

## 2023-04-19 ENCOUNTER — TRANSFERRED RECORDS (OUTPATIENT)
Dept: HEALTH INFORMATION MANAGEMENT | Facility: CLINIC | Age: 53
End: 2023-04-19
Payer: COMMERCIAL

## 2023-04-27 ENCOUNTER — APPOINTMENT (OUTPATIENT)
Dept: RADIOLOGY | Facility: HOSPITAL | Age: 53
End: 2023-04-27
Attending: EMERGENCY MEDICINE
Payer: COMMERCIAL

## 2023-04-27 ENCOUNTER — HOSPITAL ENCOUNTER (EMERGENCY)
Facility: HOSPITAL | Age: 53
Discharge: HOME | End: 2023-04-28
Attending: EMERGENCY MEDICINE | Admitting: EMERGENCY MEDICINE
Payer: COMMERCIAL

## 2023-04-27 VITALS
SYSTOLIC BLOOD PRESSURE: 126 MMHG | TEMPERATURE: 97.6 F | WEIGHT: 129.5 LBS | OXYGEN SATURATION: 97 % | BODY MASS INDEX: 24.45 KG/M2 | HEIGHT: 61 IN | HEART RATE: 83 BPM | DIASTOLIC BLOOD PRESSURE: 60 MMHG | RESPIRATION RATE: 22 BRPM

## 2023-04-27 DIAGNOSIS — U07.1 INFECTION DUE TO 2019 NOVEL CORONAVIRUS: ICD-10-CM

## 2023-04-27 LAB — GROUP A STREP BY PCR: NOT DETECTED

## 2023-04-27 PROCEDURE — 99284 EMERGENCY DEPT VISIT MOD MDM: CPT | Mod: 25,CS

## 2023-04-27 PROCEDURE — 87651 STREP A DNA AMP PROBE: CPT | Performed by: EMERGENCY MEDICINE

## 2023-04-27 PROCEDURE — 250N000012 HC RX MED GY IP 250 OP 636 PS 637: Performed by: EMERGENCY MEDICINE

## 2023-04-27 PROCEDURE — 71045 X-RAY EXAM CHEST 1 VIEW: CPT

## 2023-04-27 PROCEDURE — 87637 SARSCOV2&INF A&B&RSV AMP PRB: CPT | Performed by: EMERGENCY MEDICINE

## 2023-04-27 RX ADMIN — DEXAMETHASONE 10 MG: 2 TABLET ORAL at 22:33

## 2023-04-27 ASSESSMENT — ACTIVITIES OF DAILY LIVING (ADL): ADLS_ACUITY_SCORE: 35

## 2023-04-28 ENCOUNTER — PATIENT OUTREACH (OUTPATIENT)
Dept: CARE COORDINATION | Facility: CLINIC | Age: 53
End: 2023-04-28
Payer: COMMERCIAL

## 2023-04-28 LAB
FLUAV RNA SPEC QL NAA+PROBE: NEGATIVE
FLUBV RNA RESP QL NAA+PROBE: NEGATIVE
RSV RNA SPEC NAA+PROBE: NEGATIVE
SARS-COV-2 RNA RESP QL NAA+PROBE: POSITIVE

## 2023-04-28 RX ORDER — ALBUTEROL SULFATE 90 UG/1
2 AEROSOL, METERED RESPIRATORY (INHALATION) EVERY 6 HOURS PRN
Qty: 18 G | Refills: 0 | Status: SHIPPED | OUTPATIENT
Start: 2023-04-28 | End: 2024-01-15

## 2023-04-28 RX ORDER — INHALER, ASSIST DEVICES
SPACER (EA) MISCELLANEOUS
Qty: 1 EACH | Refills: 0 | Status: SHIPPED | OUTPATIENT
Start: 2023-04-28

## 2023-04-28 NOTE — ED PROVIDER NOTES
EMERGENCY DEPARTMENT ENCOUNTER      NAME: Cindy Smith  AGE: 52 year old female  YOB: 1970  MRN: [id number]  EVALUATION DATE & TIME: 4/27/2023  9:55 PM    PCP: Milli Cooper    ED PROVIDER: David Flores D.O.      Chief Complaint   Patient presents with     Cough     Pharyngitis       FINAL IMPRESSION:  1. Infection due to 2019 novel coronavirus        ED COURSE & MEDICAL DECISION MAKING:    10:03 PM I met with the patient to gather history and to perform my initial exam. I discussed the plan for care while in the Emergency Department.  12:13 AM Rechecked and updated patient. I discussed plans for discharge with the patient, which they were agreeable to. We discussed supportive cares at home and reasons for return to the ER including new or worsening symptoms. All questions and concerns were addressed. Patient to be discharged by RN.          Pertinent Labs & Imaging studies reviewed. (See chart for details)    52 year old female presents to the Emergency Department for evaluation of sore throat and cough.  Initial concern was for COVID-19 versus influenza versus strep pharyngitis versus pneumonia.  Chest x-ray did not show any evidence of consolidation or other acute process.  Influenza and strep test were both negative.  COVID test did come back positive, and I do believe this to be the underlying cause of the patient's symptoms.  Do not believe further evaluation is indicated.  Believe she can follow-up safely with her primary care provider.  Return precautions were discussed.  Of note her symptoms have been greater than 4 days, therefore she is not a candidate for antivirals.    Medical Decision Making    History:    Supplemental history from: Documented in chart, if applicable    External Record(s) reviewed: Documented in chart, if applicable.    Work Up:    Chart documentation includes differential considered and any EKGs or imaging independently interpreted by provider, where specified.    In  additional to work up documented, I considered the following work up: Documented in chart, if applicable.    External consultation:    Discussion of management with another provider: Documented in chart, if applicable    Complicating factors:    Care impacted by chronic illness: Chronic Pain and Mental Health    Care affected by social determinants of health: N/A    Disposition considerations: Discharge. I prescribed additional prescription strength medication(s) as charted. See documentation for any additional details.        At the conclusion of the encounter I discussed the results of all of the tests and the disposition. The questions were answered. The patient or family acknowledged understanding and was agreeable with the care plan.      HPI    Patient information was obtained from: Patient and Patient's      Use of : Yes (In Person - Patient's  at bedside) - Language Yana Smith is a 52 year old female with no pertinent medical history on file who presents to the ED for evaluation of a sore throat and cough.    Patient reports having a sore throat and dry cough for approximately 4 days. She endorses associated myalgias and chills. She also notes that initially she had associated subjective fever, however, she mentions that this has resolved. She denies any recent vomiting, diarrhea, abdominal pain, or any other complication at this time.     Patient's  endorses the patient regularly taking medications for proctitis and depression. He denies any known patient surgical history. He endorses occasional patient alcohol us, but denies any patient smoking.     REVIEW OF SYSTEMS  Constitutional:  Positive for chills. Positive for fever (resolved). Denies. weight loss or weakness  Eyes:  No pain, discharge, redness  HENT:  Positive for sore throat. Denies ear pain, congestion  Respiratory: Positive for cough (dry). No SOB, wheeze  Cardiovascular:  No CP, palpitations  GI:   Denies abdominal pain, nausea, vomiting, diarrhea  : Denies dysuria, hematuria  Musculoskeletal:  Positive for myalgias. Denies any new muscle/joint swelling or loss of function.  Skin:  Denies rash, pallor  Neurologic:  Denies headache, focal weakness or sensory changes  Lymph: Denies swollen nodes    All other systems negative unless noted in HPI.    PAST MEDICAL HISTORY:  Past Medical History:   Diagnosis Date     Urinary frequency        PAST SURGICAL HISTORY:  Past Surgical History:   Procedure Laterality Date     MAMMOPLASTY AUGMENTATION Bilateral 2009     TOTAL HIP ARTHROPLASTY Right          CURRENT MEDICATIONS:    No current facility-administered medications for this encounter.     Current Outpatient Medications   Medication     albuterol (PROAIR HFA/PROVENTIL HFA/VENTOLIN HFA) 108 (90 Base) MCG/ACT inhaler     spacer (OPTICHAMSignal Data) holding chamber     ARIPiprazole (ABILIFY) 5 MG tablet     balsalazide (COLAZAL) 750 MG capsule     cetirizine (ZYRTEC) 10 MG tablet     Cholecalciferol (VITAMIN D3) 50 MCG (2000 UT) CAPS     mesalamine (CANASA) 1000 MG suppository     PARoxetine (PAXIL) 20 MG tablet     traZODone (DESYREL) 50 MG tablet     triamcinolone (KENALOG) 0.1 % external cream         ALLERGIES:  No Known Allergies    FAMILY HISTORY:  Family History   Problem Relation Age of Onset     Breast Cancer No family hx of      Diabetes No family hx of      Hypertension No family hx of      Hyperlipidemia No family hx of      Cerebrovascular Disease No family hx of      Coronary Artery Disease No family hx of        SOCIAL HISTORY:  Social History     Socioeconomic History     Marital status:    Tobacco Use     Smoking status: Never     Smokeless tobacco: Never   Substance and Sexual Activity     Alcohol use: Not Currently     Comment: rarely     Drug use: No       VITALS:  Patient Vitals for the past 24 hrs:   BP Temp Temp src Pulse Resp SpO2 Height Weight   04/27/23 2153 126/60 97.6  F (36.4  C)  "Temporal 83 22 97 % 1.549 m (5' 1\") 58.7 kg (129 lb 8 oz)       PHYSICAL EXAM    VITAL SIGNS: /60   Pulse 83   Temp 97.6  F (36.4  C) (Temporal)   Resp 22   Ht 1.549 m (5' 1\")   Wt 58.7 kg (129 lb 8 oz)   LMP 04/05/2023   SpO2 97%   BMI 24.47 kg/m      General Appearance: Well-appearing, well-nourished, no acute distress   Head:  Normocephalic, without obvious abnormality, atraumatic  Eyes:  PERRL, conjunctiva/corneas clear, EOM's intact,  ENT:  Mild pharyngeal erythema. Anterior cervical lymphadenopathy. Lips, and tongue normal, membranes are moist without pallor  Neck:  Normal ROM, symmetrical, trachea midline    Cardio:  Regular rate and rhythm, no murmur, rub or gallop, 2+ pulses symmetric in all extremities  Pulm:  Clear to auscultation bilaterally, respirations unlabored,  Abdomen:  Soft, non-tender, no rebound or guarding.  Musculoskeletal: Full ROM, no edema, no cyanosis, good ROM of major joints  Integument:  Warm, Dry, No erythema, No rash.    Neurologic:  Alert & oriented.  No focal deficits appreciated.  Ambulatory.  Psychiatric:  Affect normal, Judgment normal, Mood normal.      LABS  Results for orders placed or performed during the hospital encounter of 04/27/23 (from the past 24 hour(s))   Symptomatic Influenza A/B, RSV, & SARS-CoV2 PCR (COVID-19) Nasopharyngeal    Specimen: Nasopharyngeal; Swab   Result Value Ref Range    Influenza A PCR Negative Negative    Influenza B PCR Negative Negative    RSV PCR Negative Negative    SARS CoV2 PCR Positive (A) Negative    Narrative    Testing was performed using the Xpert Xpress CoV2/Flu/RSV Assay on the  GeneXpert Instrument. This test should be ordered for the detection of SARS-CoV-2, influenza, and RSV viruses in individuals who meet clinical and/or epidemiological criteria. Test performance is unknown in asymptomatic patients. This test is for in vitro diagnostic use under the FDA EUA for laboratories certified under CLIA to perform " high or moderate complexity testing. This test has not been FDA cleared or approved. A negative result does not rule out the presence of PCR inhibitors in the specimen or target RNA in concentration below the limit of detection for the assay. If only one viral target is positive but coinfection with multiple targets is suspected, the sample should be re-tested with another FDA cleared, approved, or authorized test, if coinfection would change clinical management. This test was validated by the [facility]. These laboratories are certified under the Clinical Laboratory Improvement Amendments of 1988 (CLIA-88) as qualified to perform high complexity laboratory testing.   Group A Streptococcus PCR Throat Swab    Specimen: Throat; Swab   Result Value Ref Range    Group A strep by PCR Not Detected Not Detected    Narrative    The Xpert Xpress Strep A test, performed on the [employer], is a rapid, qualitative in vitro diagnostic test for the detection of Streptococcus pyogenes (Group A ß-hemolytic Streptococcus, Strep A) in throat swab specimens from patients with signs and symptoms of pharyngitis. The Xpert Xpress Strep A test can be used as an aid in the diagnosis of Group A Streptococcal pharyngitis. The assay is not intended to monitor treatment for Group A Streptococcus infections. The Xpert Xpress Strep A test utilizes an automated real-time polymerase chain reaction (PCR) to detect Streptococcus pyogenes DNA.   XR Chest Port 1 View    Narrative    EXAM: XR CHEST PORT 1 VIEW  LOCATION: [facility]  DATE/TIME: 4/27/2023 11:22 PM CDT    INDICATION: cough  COMPARISON: 08/15/2018      Impression    IMPRESSION: Stable chest with no acute findings seen to explain patient's cough clinically. Again seen is some minimal pleural fluid versus thickening in the left lung base laterally with minimal adjacent atelectasis. Slightly calcified thoracic aorta.          RADIOLOGY  XR Chest Port 1 View   Final Result   IMPRESSION: Stable chest with no acute findings seen to explain patient's cough clinically. Again seen is some minimal pleural fluid versus thickening in the left lung base laterally with minimal adjacent atelectasis. Slightly calcified thoracic aorta.           PROCEDURES:  None.    MEDICATIONS GIVEN IN THE EMERGENCY:  Medications   dexamethasone (DECADRON) tablet 10 mg (10 mg Oral $Given 4/27/23 2233)       NEW PRESCRIPTIONS STARTED AT TODAY'S ER VISIT  Discharge Medication List as of 4/28/2023 12:21 AM      START taking these medications    Details   albuterol (PROAIR HFA/PROVENTIL HFA/VENTOLIN HFA) 108 (90 Base) MCG/ACT inhaler Inhale 2 puffs into the lungs every 6 hours as needed for shortness of breath, wheezing or cough, Disp-18 g, R-0, [facility] may dispense brand covered by insurance (Proair, or proventil or ventolin or generic albuterol inhaler)      spacer (OPTICHAMBER GHANSHYAM) holding chamber Use with albuterol inhaler, Disp-1 each, R-0, Local Print              I, Dalton Casey, am serving as a scribe to document services personally performed by David Flores D.O. based on my observations and the provider's statements to me.  I,   David Flores D.O., attest that Dalton Casey is acting in a scribe capacity, has observed my performance of the services and has documented them in accordance with my direction.     David Flores D.O.  Emergency Medicine  [facility] EMERGENCY DEPARTMENT  [facility] [facility] [address]  [phone]  Dept: [phone]       David Flores,   04/28/23 0251

## 2023-04-28 NOTE — PROGRESS NOTES
Clinic Care Coordination Contact    Follow Up Progress Note      Assessment: Assessment: The pt was recently in the ED, I called to check up on the pt and help the pt setup a ED follow up. The pt was at [facility] for a cough and pharyngitis. I called and talked to the pt, she stated that she is doing better. She would like a follow up, so I was able to setup for the pt to come in on 05/05/2023 at 1:40pm with .    Care Gaps:    Health Maintenance Due   Topic Date Due     ADVANCE CARE PLANNING  Never done     DEPRESSION ACTION PLAN  Never done     Pneumococcal Vaccine: Pediatrics (0 to 5 Years) and At-Risk Patients (6 to 64 Years) (1 - PCV) Never done     ZOSTER IMMUNIZATION (1 of 2) Never done     HEPATITIS B IMMUNIZATION (2 of 3 - Hep B Twinrix 3-dose series) 06/20/2006     COVID-19 Vaccine (4 - Booster) 09/29/2022           Care Plans      Intervention/Education provided during outreach:             Plan:     Care Coordinator will follow up in

## 2023-05-02 DIAGNOSIS — K51.311 ULCERATIVE RECTOSIGMOIDITIS WITH RECTAL BLEEDING (H): ICD-10-CM

## 2023-05-05 ENCOUNTER — OFFICE VISIT (OUTPATIENT)
Dept: FAMILY MEDICINE | Facility: CLINIC | Age: 53
End: 2023-05-05
Payer: COMMERCIAL

## 2023-05-05 VITALS
SYSTOLIC BLOOD PRESSURE: 120 MMHG | DIASTOLIC BLOOD PRESSURE: 76 MMHG | OXYGEN SATURATION: 99 % | TEMPERATURE: 96.1 F | HEART RATE: 62 BPM

## 2023-05-05 DIAGNOSIS — U07.1 INFECTION DUE TO 2019 NOVEL CORONAVIRUS: ICD-10-CM

## 2023-05-05 DIAGNOSIS — R07.9 CHEST PAIN, UNSPECIFIED TYPE: Primary | ICD-10-CM

## 2023-05-05 PROCEDURE — 99214 OFFICE O/P EST MOD 30 MIN: CPT | Mod: GC

## 2023-05-05 PROCEDURE — 93000 ELECTROCARDIOGRAM COMPLETE: CPT | Mod: GC

## 2023-05-05 RX ORDER — MESALAMINE 1000 MG/1
1000 SUPPOSITORY RECTAL AT BEDTIME
Qty: 30 SUPPOSITORY | Refills: 2 | Status: SHIPPED | OUTPATIENT
Start: 2023-05-05 | End: 2024-01-18

## 2023-05-05 NOTE — PROGRESS NOTES
Assessment & Plan     Chest pain, unspecified type  Longstanding history of intermittent chest pain,, worsening over the past couple months.  Overall, do have a lower suspicion for angina with some reproducibility with palpation and deep breaths.  No significant risk factors for coronary disease, however I think with the chronicity of the symptoms, and associated shortness of breath, it would be reasonable to do a stress test.  EKG today without any acute ST or T wave changes.  - EKG 12-lead complete w/read - Clinics  - Exercise Stress Test - Adult    Infection due to 2019 novel coronavirus  Recent covid infection. Asymptomatic at this point. Lungs clear.  Follow-up precautions provided          No follow-ups on file.    Javier Thibodeaux MD  [facility] [place] Subjective Bloua is a 52 year old, presenting for the following health issues:  Chest Pain (X2 mo tylenol helps some times) and Recheck Medication (Pt does not know names of meds)      HPI   ED follow-up.  Presents to the ED with sore throat and cough on 4/27/2023.  Chest x-ray was negative at that time.  Influenza and strep were negative.  COVID was positive.  Symptoms have been present for 4 days prior to ED visit. Started on an albuterol inhaler    Left sided chest pain   -Has been present for multiple years.  -Worse for the past two months  -Sharp pain stabbing sensation  -Worse with taking big breaths.   -Does experience some shortness of breath when the pain is severe.  Painful when pressing on the area   Has tried Tylenol with some improvement.  No recent trauma to the area.  No family history of heart disease  Does have a history of palpitations that she does feel occasionally.  Reports wearing a heart monitor in the past that was normal.   No dizziness or lightheadedness.   Happens more often at night.   Has had normal TSH, CBC in the past.         Review of Systems   Constitutional, HEENT, cardiovascular, pulmonary, gi  and gu systems are negative, except as otherwise noted.      Objective    /76   Pulse 62   Temp (!) 96.1  F (35.6  C) (Tympanic)   LMP 04/05/2023 (Exact Date)   SpO2 99%   There is no height or weight on file to calculate BMI.  Physical Exam   GENERAL: healthy, alert and no distress  NECK: no adenopathy, no asymmetry, masses, or scars and thyroid normal to palpation  RESP/chest: lungs clear to auscultation - no rales, rhonchi or wheezes.  Mild tenderness over the sternum.  No left upper chest wall tenderness.  No crepitus.  CV: regular rate and rhythm, normal S1 S2, no S3 or S4, no murmur, click or rub, no peripheral edema and peripheral pulses strong  MS: no gross musculoskeletal defects noted, no edema    EKG - Reviewed and interpreted by me appears normal, NSR, normal axis, normal intervals, no acute ST/T changes c/w ischemia, no LVH by voltage criteria, unchanged from previous tracings

## 2023-05-08 NOTE — PROGRESS NOTES
Faculty Supervision of Residents   I have examined this patient and the medical care has been evaluated and discussed with the resident. See resident note outlining our discussion.    EKG reviewed by myself and discussed with resident I am in agreement with documented findings.    Smita Jimenez MD

## 2023-05-09 ENCOUNTER — APPOINTMENT (OUTPATIENT)
Dept: INTERPRETER SERVICES | Facility: CLINIC | Age: 53
End: 2023-05-09
Payer: COMMERCIAL

## 2023-05-11 ENCOUNTER — HOSPITAL ENCOUNTER (EMERGENCY)
Facility: HOSPITAL | Age: 53
Discharge: HOME | End: 2023-05-11
Admitting: EMERGENCY MEDICINE
Payer: COMMERCIAL

## 2023-05-11 VITALS
OXYGEN SATURATION: 98 % | TEMPERATURE: 97.3 F | DIASTOLIC BLOOD PRESSURE: 67 MMHG | SYSTOLIC BLOOD PRESSURE: 153 MMHG | RESPIRATION RATE: 16 BRPM | HEART RATE: 59 BPM | BODY MASS INDEX: 24.47 KG/M2 | HEIGHT: 61 IN

## 2023-05-11 DIAGNOSIS — L03.90 CELLULITIS: ICD-10-CM

## 2023-05-11 DIAGNOSIS — L50.9 URTICARIA: ICD-10-CM

## 2023-05-11 PROCEDURE — 250N000011 HC RX IP 250 OP 636: Performed by: EMERGENCY MEDICINE

## 2023-05-11 PROCEDURE — 99284 EMERGENCY DEPT VISIT MOD MDM: CPT | Mod: 25

## 2023-05-11 PROCEDURE — 250N000013 HC RX MED GY IP 250 OP 250 PS 637: Performed by: EMERGENCY MEDICINE

## 2023-05-11 PROCEDURE — 96374 THER/PROPH/DIAG INJ IV PUSH: CPT

## 2023-05-11 RX ORDER — DIPHENHYDRAMINE HCL 25 MG
50 TABLET ORAL EVERY 6 HOURS PRN
Qty: 30 TABLET | Refills: 0 | Status: SHIPPED | OUTPATIENT
Start: 2023-05-11 | End: 2023-05-18

## 2023-05-11 RX ORDER — DIPHENHYDRAMINE HYDROCHLORIDE 50 MG/ML
50 INJECTION INTRAMUSCULAR; INTRAVENOUS ONCE
Status: CANCELLED | OUTPATIENT
Start: 2023-05-11 | End: 2023-05-11

## 2023-05-11 RX ORDER — HYDROXYZINE HYDROCHLORIDE 25 MG/1
25 TABLET, FILM COATED ORAL 3 TIMES DAILY PRN
Qty: 15 TABLET | Refills: 0 | Status: SHIPPED | OUTPATIENT
Start: 2023-05-11 | End: 2023-06-26

## 2023-05-11 RX ORDER — LORATADINE 10 MG/1
10 TABLET ORAL DAILY
Qty: 30 TABLET | Refills: 0 | Status: SHIPPED | OUTPATIENT
Start: 2023-05-11 | End: 2023-06-10

## 2023-05-11 RX ORDER — PREDNISONE 20 MG/1
TABLET ORAL
Qty: 10 TABLET | Refills: 0 | Status: SHIPPED | OUTPATIENT
Start: 2023-05-11 | End: 2023-07-21

## 2023-05-11 RX ORDER — DIPHENHYDRAMINE HCL 50 MG
50 CAPSULE ORAL ONCE
Status: COMPLETED | OUTPATIENT
Start: 2023-05-11 | End: 2023-05-11

## 2023-05-11 RX ORDER — CEPHALEXIN 500 MG/1
500 CAPSULE ORAL 4 TIMES DAILY
Qty: 28 CAPSULE | Refills: 0 | Status: SHIPPED | OUTPATIENT
Start: 2023-05-11 | End: 2023-05-18

## 2023-05-11 RX ORDER — HYDROXYZINE HYDROCHLORIDE 25 MG/1
25 TABLET, FILM COATED ORAL ONCE
Status: COMPLETED | OUTPATIENT
Start: 2023-05-11 | End: 2023-05-11

## 2023-05-11 RX ORDER — METHYLPREDNISOLONE SODIUM SUCCINATE 125 MG/2ML
125 INJECTION, POWDER, LYOPHILIZED, FOR SOLUTION INTRAMUSCULAR; INTRAVENOUS ONCE
Status: COMPLETED | OUTPATIENT
Start: 2023-05-11 | End: 2023-05-11

## 2023-05-11 RX ADMIN — HYDROXYZINE HYDROCHLORIDE 25 MG: 25 TABLET, FILM COATED ORAL at 22:01

## 2023-05-11 RX ADMIN — DIPHENHYDRAMINE HYDROCHLORIDE 50 MG: 50 CAPSULE ORAL at 22:01

## 2023-05-11 RX ADMIN — METHYLPREDNISOLONE SODIUM SUCCINATE 125 MG: 125 INJECTION, POWDER, LYOPHILIZED, FOR SOLUTION INTRAMUSCULAR; INTRAVENOUS at 22:08

## 2023-05-11 ASSESSMENT — ACTIVITIES OF DAILY LIVING (ADL)
ADLS_ACUITY_SCORE: 35
ADLS_ACUITY_SCORE: 33

## 2023-05-12 ENCOUNTER — PATIENT OUTREACH (OUTPATIENT)
Dept: CARE COORDINATION | Facility: CLINIC | Age: 53
End: 2023-05-12
Payer: COMMERCIAL

## 2023-05-12 NOTE — PROGRESS NOTES
Clinic Care Coordination Contact    Follow Up Progress Note      Assessment: The pt was recently in the ED, I called to check up on the pt and help the pt setup a ED follow up. The pt was at [facility] for hives. I called the pt, but got her vm, so I left a vm for the pt to give me a call back.     Care Gaps:    Health Maintenance Due   Topic Date Due     ADVANCE CARE PLANNING  Never done     DEPRESSION ACTION PLAN  Never done     Pneumococcal Vaccine: Pediatrics (0 to 5 Years) and At-Risk Patients (6 to 64 Years) (1 - PCV) Never done     ZOSTER IMMUNIZATION (1 of 2) Never done     HEPATITIS B IMMUNIZATION (2 of 3 - Hep B Twinrix 3-dose series) 06/20/2006     COVID-19 Vaccine (4 - Booster) 09/29/2022     MAMMO SCREENING  06/09/2023           Care Plans      Intervention/Education provided during outreach:               Plan:     Care Coordinator will follow up in

## 2023-05-12 NOTE — ED TRIAGE NOTES
"Patient presents to ED with generalized hives that have been ongoing for the past week.  Denies any breathing issues. Has had this issue in the past and was given \"2 shots which fixed the symptoms.\"  This was a couple years ago. Put cortisone on it today, but did not help.      Triage Assessment     Row Name 05/11/23 2032       Triage Assessment (Adult)    Airway WDL WDL       Respiratory WDL    Respiratory WDL WDL       Skin Circulation/Temperature WDL    Skin Circulation/Temperature WDL WDL       Cardiac WDL    Cardiac WDL WDL       Peripheral/Neurovascular WDL    Peripheral Neurovascular WDL WDL       Cognitive/Neuro/Behavioral WDL    Cognitive/Neuro/Behavioral WDL WDL              "

## 2023-05-12 NOTE — DISCHARGE INSTRUCTIONS
You were seen and evaluated here in the emergency department for your rash.  At this time, it is unclear the specific cause of your rash however, you are having improvement of your symptoms with medications given here.  This has happened to you before and it was unclear why I do recommend follow-up with allergy and I do think that your primary care should give you a referral for this.     For your symptoms, I will give you a prescription for prednisone to take for the next 5 days, Benadryl, hydroxyzine to help with itching.  I also recommend taking loratadine once daily and give you a prescription for this.    As you are other rashes improving with medications here however the large area on the right side of your abdomen and concerned about possible infection we will give you a prescription for Keflex to take for the next 7 days.    You start to develop significant fevers, difficulty breathing, difficulty swallowing, rash to the face or other concerning symptoms you should return to the emergency department.  Otherwise, follow-up with primary care in the next 3 to 5 days and call them tomorrow to get the appointment scheduled.

## 2023-05-12 NOTE — ED PROVIDER NOTES
EMERGENCY DEPARTMENT ENCOUNTER      NAME: Cindy Smith  AGE: 52 year old female  YOB: 1970  MRN: [id number]  EVALUATION DATE & TIME: 5/11/2023  8:49 PM    PCP: Milli Cooper    ED PROVIDER: Soha Ferguson PA-C      Chief Complaint   Patient presents with     Hives         FINAL IMPRESSION:  1. Urticaria    2. Cellulitis          MEDICAL DECISION MAKING:    Pertinent Labs & Imaging studies reviewed. (See chart for details)  52 year old female presents to the Emergency Department for evaluation of a rash.  The patient has been struggling with intermittent rash on her abdomen, neck and extremities for the past few weeks.  Most recently it was present for 4 to 5 days on her abdomen but then resolved but did return today.  The area has been extremely itchy and she has not had any resolution with hydrocortisone cream or cetirizine and presented to the emergency department today.  On my evaluation, the patient was vitally stable and well-appearing.  Examination with urticaria scattered across the abdomen and back.  Area of urticaria on the right abdomen with associated redness and warmth.  It was soft, nontender without any rebound or guarding.  Heart was in regular rate and rhythm and lungs were clear to auscultation bilaterally.  Oropharynx without any intraoral swelling and uvula was midline.  Tolerating secretions without difficulty.  No facial swelling or rash on the face.    At this time, with normal vital signs and examination without any facial swelling, intraoral swelling or other concerns do not feel that she is having an anaphylactic reaction or requires epinephrine at this time.  She does have a rash on her abdomen that was raised and does appear to be consistent with urticaria.  The area on her right abdomen does appear to be more red and raised than the other areas on her however, she has been itching this area more frequently and recently.   does note that she has had symptoms similar  to this in the past and she has received steroids with resolution.  She has not had any new soaps, detergents, medications, lotions or other and was unclear what she is reacting to at this time.  I will give her a dose of Solu-Medrol, hydroxyzine and Benadryl here in the emergency department.  After this, she did have significant improvement of her symptoms especially the itching.  I did reevaluate the rash and it was improving however, on her right abdomen I did still appear to be quite raised and red and warm.  As she has been scratching this area quite frequently I am concerned for some infection.  She is not having any infection symptoms including fevers, chills or other that would require CBC or other laboratory evaluation at this time.  I will cover her for cellulitis with Keflex to take for the next 7 days.  I will also give her discharge home with prednisone, hydroxyzine, Benadryl, loratadine to take for her symptoms.  I recommend close follow-up with primary care and referral to allergy.  We discussed signs and symptoms to return and all questions were answered best my ability.  She was discharged in the emergency department stable condition.    Medical Decision Making    History:    Supplemental history from: Documented in chart, if applicable    External Record(s) reviewed: Documented in chart, if applicable.    Work Up:    Chart documentation includes differential considered and any EKGs or imaging independently interpreted by provider, where specified.    In additional to work up documented, I considered the following work up: Documented in chart, if applicable.    External consultation:    Discussion of management with another provider: Documented in chart, if applicable    Complicating factors:    Care impacted by chronic illness: N/A    Care affected by social determinants of health: N/A    Disposition considerations: Discharge. I prescribed additional prescription strength medication(s) as charted.  See documentation for any additional details.         ED COURSE:  9:07 PM I met with the patient, obtained history, performed an initial exam, and discussed options and plan for diagnostics and treatment here in the ED.    10:43 PM Patient discharged after being provided with extensive anticipatory guidance and given return precautions, importance of PCP follow-up emphasized.    At the conclusion of the encounter I discussed the results of all of the tests and the disposition. The questions were answered. The patient or family acknowledged understanding and was agreeable with the care plan.     MEDICATIONS GIVEN IN THE EMERGENCY:  Medications   methylPREDNISolone sodium succinate (solu-MEDROL) injection 125 mg (125 mg Intravenous $Given 5/11/23 2208)   diphenhydrAMINE (BENADRYL) capsule 50 mg (50 mg Oral $Given 5/11/23 2201)   hydrOXYzine (ATARAX) tablet 25 mg (25 mg Oral $Given 5/11/23 2201)       NEW PRESCRIPTIONS STARTED AT TODAY'S ER VISIT  Discharge Medication List as of 5/11/2023 10:54 PM      START taking these medications    Details   cephALEXin (KEFLEX) 500 MG capsule Take 1 capsule (500 mg) by mouth 4 times daily for 7 days, Disp-28 capsule, R-0, Local Print      diphenhydrAMINE (BENADRYL) 25 MG tablet Take 2 tablets (50 mg) by mouth every 6 hours as needed for itching or allergies, Disp-30 tablet, R-0, Local Print      hydrOXYzine (ATARAX) 25 MG tablet Take 1 tablet (25 mg) by mouth 3 times daily as needed for itching, Disp-15 tablet, R-0, Local Print      loratadine (CLARITIN) 10 MG tablet Take 1 tablet (10 mg) by mouth daily for 30 days, Disp-30 tablet, R-0, Local Print      predniSONE (DELTASONE) 20 MG tablet Take two tablets (= 40mg) each day for 5 (five) days, Disp-10 tablet, R-0, Local Print                  =================================================================    HPI:    Patient information was obtained from: The patient and her     Use of Interpretor: Yes (phone) - Language  Yana Smith is a 52 year old female who presents to this ED via private vehicle for evaluation of rash.  Over the last several days the patient has had an intermittent rash on her torso, extremities and neck.  Her symptoms were ongoing for 4 to 5 days and then resolved however, recurred today.  Her symptoms have been so severe that she cannot stop itching and she was concerned so she presented to the emergency department.  On my evaluation, the patient denies any fevers, chills, nausea, vomiting, diarrhea.  She is not having chest pain or difficulty breathing.  She denies any new soaps, detergents, lotions or medications.  She has tried hydrocortisone cream as well as cetirizine without significant improvement of her symptoms.  She has not had any difficulty swallowing or difficulty breathing.   does note that she had this happen once approximately 1 to 2 years ago and she was given some steroids with resolution of her symptoms.  Does not voice any other concerns at this time    REVIEW OF SYSTEMS:  Negative unless otherwise stated in the above HPI.      PAST MEDICAL HISTORY:  Past Medical History:   Diagnosis Date     Urinary frequency        PAST SURGICAL HISTORY:  Past Surgical History:   Procedure Laterality Date     MAMMOPLASTY AUGMENTATION Bilateral 2009     TOTAL HIP ARTHROPLASTY Right            CURRENT MEDICATIONS:    No current facility-administered medications for this encounter.    Current Outpatient Medications:      cephALEXin (KEFLEX) 500 MG capsule, Take 1 capsule (500 mg) by mouth 4 times daily for 7 days, Disp: 28 capsule, Rfl: 0     diphenhydrAMINE (BENADRYL) 25 MG tablet, Take 2 tablets (50 mg) by mouth every 6 hours as needed for itching or allergies, Disp: 30 tablet, Rfl: 0     hydrOXYzine (ATARAX) 25 MG tablet, Take 1 tablet (25 mg) by mouth 3 times daily as needed for itching, Disp: 15 tablet, Rfl: 0     loratadine (CLARITIN) 10 MG tablet, Take 1 tablet (10 mg) by mouth  daily for 30 days, Disp: 30 tablet, Rfl: 0     predniSONE (DELTASONE) 20 MG tablet, Take two tablets (= 40mg) each day for 5 (five) days, Disp: 10 tablet, Rfl: 0     albuterol (PROAIR HFA/PROVENTIL HFA/VENTOLIN HFA) 108 (90 Base) MCG/ACT inhaler, Inhale 2 puffs into the lungs every 6 hours as needed for shortness of breath, wheezing or cough, Disp: 18 g, Rfl: 0     ARIPiprazole (ABILIFY) 5 MG tablet, Take 1 tablet (5 mg) by mouth daily, Disp: 90 tablet, Rfl: 3     balsalazide (COLAZAL) 750 MG capsule, Take 3 capsules (2,250 mg) by mouth 3 times daily, Disp: 270 capsule, Rfl: 0     cetirizine (ZYRTEC) 10 MG tablet, Take 1 tablet (10 mg) by mouth daily, Disp: 30 tablet, Rfl: 2     Cholecalciferol (VITAMIN D3) 50 MCG (2000 UT) CAPS, Take 2,000 Units by mouth daily, Disp: 90 capsule, Rfl: 3     mesalamine (CANASA) 1000 MG suppository, Place 1 suppository (1,000 mg) rectally At Bedtime, Disp: 30 suppository, Rfl: 2     PARoxetine (PAXIL) 20 MG tablet, Take 1 tablet (20 mg) by mouth At Bedtime, Disp: 90 tablet, Rfl: 3     spacer (OPTICHAMBER GHANSHYAM) holding chamber, Use with albuterol inhaler, Disp: 1 each, Rfl: 0     traZODone (DESYREL) 50 MG tablet, Take 1 tablet (50 mg) by mouth nightly as needed for sleep, Disp: 90 tablet, Rfl: 3     triamcinolone (KENALOG) 0.1 % external cream, Apply topically 2 times daily, Disp: 45 g, Rfl: 1      ALLERGIES:  No Known Allergies    FAMILY HISTORY:  Family History   Problem Relation Age of Onset     Breast Cancer No family hx of      Diabetes No family hx of      Hypertension No family hx of      Hyperlipidemia No family hx of      Cerebrovascular Disease No family hx of      Coronary Artery Disease No family hx of        SOCIAL HISTORY:   Social History     Socioeconomic History     Marital status:    Tobacco Use     Smoking status: Never     Smokeless tobacco: Never   Substance and Sexual Activity     Alcohol use: Not Currently     Comment: rarely     Drug use: No  "      VITALS:  Patient Vitals for the past 24 hrs:   BP Temp Temp src Pulse Resp SpO2 Height   05/11/23 2251 (!) 153/67 -- -- 59 -- 98 % --   05/11/23 2031 124/63 97.3  F (36.3  C) Oral 76 16 97 % 1.549 m (5' 1\")       PHYSICAL EXAM   Constitutional: Well developed, Well nourished, NAD  HENT: Normocephalic, Atraumatic, Bilateral external ears normal, Oropharynx normal, mucous membranes moist, Nose normal.  No intraoral swelling.  Uvula midline.  No rash on the face or facial swelling.  Neck: Normal range of motion, No tenderness, Supple, No stridor.  Eyes: PERRL, EOMI, Conjunctiva normal, No discharge.   Respiratory: Normal breath sounds, No respiratory distress, No wheezing, Speaks full sentences easily. No cough.  Cardiovascular: Normal heart rate, Regular rhythm, No murmurs, No rubs, No gallops. Chest wall nontender.  GI: Soft, No tenderness, No masses, No flank tenderness. No rebound or guarding.  Musculoskeletal:Good range of motion in all major joints.   Integument: Urticaria scattered across the abdomen and back.  Area of urticaria on the right abdomen with associated redness and warmth.  Warm, Dry, No erythema, No rash. No petechiae.  Neurologic: Alert & oriented x 3, Normal motor function, Normal sensory function, No focal deficits noted. Normal gait.  Psychiatric: Affect normal, Judgment normal, Mood normal. Cooperative.    LAB:  All pertinent labs reviewed and interpreted.  No results found for this or any previous visit (from the past 24 hour(s)).      RADIOLOGY:  Reviewed all pertinent imaging. Please see official radiology report.  No orders to display       Soha Ferguson PA-C  Emergency Medicine  [facility]  5/11/2023      Soha Ferguson PA-C  05/12/23 0008    "

## 2023-05-16 ENCOUNTER — PATIENT OUTREACH (OUTPATIENT)
Dept: CARE COORDINATION | Facility: CLINIC | Age: 53
End: 2023-05-16
Payer: COMMERCIAL

## 2023-05-16 NOTE — PROGRESS NOTES
Clinic Care Coordination Contact    Follow Up Progress Note      Assessment: The pt was recently in the ED, I called to check up on the pt and help the pt setup a ED follow up. The pt was at [facility] for hives. I called the pt,but got her vm, so I left a vm for the pt to give me a call back.     Care Gaps:    Health Maintenance Due   Topic Date Due     ADVANCE CARE PLANNING  Never done     DEPRESSION ACTION PLAN  Never done     Pneumococcal Vaccine: Pediatrics (0 to 5 Years) and At-Risk Patients (6 to 64 Years) (1 - PCV) Never done     ZOSTER IMMUNIZATION (1 of 2) Never done     HEPATITIS B IMMUNIZATION (2 of 3 - Hep B Twinrix 3-dose series) 06/20/2006     COVID-19 Vaccine (4 - Booster) 09/29/2022     MAMMO SCREENING  06/09/2023           Care Plans      Intervention/Education provided during outreach:               Plan:     Care Coordinator will follow up in

## 2023-06-20 DIAGNOSIS — K51.311 ULCERATIVE RECTOSIGMOIDITIS WITH RECTAL BLEEDING (H): ICD-10-CM

## 2023-06-20 RX ORDER — BALSALAZIDE DISODIUM 750 MG/1
2250 CAPSULE ORAL 3 TIMES DAILY
Qty: 270 CAPSULE | Refills: 5 | Status: SHIPPED | OUTPATIENT
Start: 2023-06-20 | End: 2023-06-26

## 2023-06-26 ENCOUNTER — OFFICE VISIT (OUTPATIENT)
Dept: FAMILY MEDICINE | Facility: CLINIC | Age: 53
End: 2023-06-26
Payer: COMMERCIAL

## 2023-06-26 VITALS
OXYGEN SATURATION: 98 % | TEMPERATURE: 98.1 F | SYSTOLIC BLOOD PRESSURE: 144 MMHG | RESPIRATION RATE: 18 BRPM | HEART RATE: 64 BPM | DIASTOLIC BLOOD PRESSURE: 84 MMHG

## 2023-06-26 DIAGNOSIS — L29.89 CHRONIC PRURITIC RASH IN ADULT: ICD-10-CM

## 2023-06-26 DIAGNOSIS — K51.311 ULCERATIVE RECTOSIGMOIDITIS WITH RECTAL BLEEDING (H): ICD-10-CM

## 2023-06-26 DIAGNOSIS — K51.30 ULCERATIVE RECTOSIGMOIDITIS WITHOUT COMPLICATION (H): ICD-10-CM

## 2023-06-26 DIAGNOSIS — L50.9 URTICARIA: Primary | ICD-10-CM

## 2023-06-26 PROBLEM — H93.13 TINNITUS OF BOTH EARS: Status: ACTIVE | Noted: 2018-10-23

## 2023-06-26 PROBLEM — H11.002: Status: ACTIVE | Noted: 2023-06-26

## 2023-06-26 PROCEDURE — 0121A COVID-19 BIVALENT 12+ (PFIZER): CPT | Performed by: STUDENT IN AN ORGANIZED HEALTH CARE EDUCATION/TRAINING PROGRAM

## 2023-06-26 PROCEDURE — 90746 HEPB VACCINE 3 DOSE ADULT IM: CPT | Performed by: STUDENT IN AN ORGANIZED HEALTH CARE EDUCATION/TRAINING PROGRAM

## 2023-06-26 PROCEDURE — 99214 OFFICE O/P EST MOD 30 MIN: CPT | Mod: 25 | Performed by: STUDENT IN AN ORGANIZED HEALTH CARE EDUCATION/TRAINING PROGRAM

## 2023-06-26 PROCEDURE — 91312 COVID-19 BIVALENT 12+ (PFIZER): CPT | Performed by: STUDENT IN AN ORGANIZED HEALTH CARE EDUCATION/TRAINING PROGRAM

## 2023-06-26 PROCEDURE — 90471 IMMUNIZATION ADMIN: CPT | Performed by: STUDENT IN AN ORGANIZED HEALTH CARE EDUCATION/TRAINING PROGRAM

## 2023-06-26 RX ORDER — BALSALAZIDE DISODIUM 750 MG/1
2250 CAPSULE ORAL 3 TIMES DAILY
Qty: 270 CAPSULE | Refills: 5 | Status: SHIPPED | OUTPATIENT
Start: 2023-06-26 | End: 2024-07-19

## 2023-06-26 RX ORDER — CETIRIZINE HYDROCHLORIDE 10 MG/1
10 TABLET ORAL DAILY
Qty: 30 TABLET | Refills: 2 | Status: SHIPPED | OUTPATIENT
Start: 2023-06-26 | End: 2024-01-29

## 2023-06-26 RX ORDER — HYDROXYZINE HYDROCHLORIDE 25 MG/1
25 TABLET, FILM COATED ORAL 3 TIMES DAILY PRN
Qty: 30 TABLET | Refills: 1 | Status: SHIPPED | OUTPATIENT
Start: 2023-06-26

## 2023-06-26 NOTE — PROGRESS NOTES
Assessment & Plan     Chronic pruritic rash in adult  Urticaria  Chronic reoccuring rash that itches. Has not discovered any particular trigger. Would like allergy referral. Medications for symptoms are moderately helpful. No particular rash present today, but did review photos on her phone.   - hydrOXYzine (ATARAX) 25 MG tablet; Take 1 tablet (25 mg) by mouth 3 times daily as needed for itching  - cetirizine (ZYRTEC) 10 MG tablet; Take 1 tablet (10 mg) by mouth daily  - Adult Allergy/Asthma Referral; Future    Ulcerative rectosigmoiditis without complication (H)  Ulcerative rectosigmoiditis with rectal bleeding (H)  Symptoms greatly improved, stable.  Follows with GI, last seen 4/2023. Has follow up planned later this year. Requesting refill of medication.   - balsalazide (COLAZAL) 750 MG capsule; Take 3 capsules (2,250 mg) by mouth 3 times daily                 No follow-ups on file.    Sohail Hoffmann DO [facility]    Precepted with Dr. Felipe Donaldson MD    Vi White is a 53 year old, presenting for the following health issues:  Rash (Rash on neck, ankle, feet, and stomach on and off rash. )        6/26/2023     3:25 PM   Additional Questions   Roomed by chelsi lilly   Accompanied by self     HPI           Went to ED 5/11 for rash, diagnosed with urticaria/cellulitis. She was discharged on 7 day course of keflex, prednisone, hydroxyzine, benadryl and loratadine.     No new foods, no new clothing, no new shampoos or soaps, no new laundry detergents.     For the past few days it has gotten better. Initially it was present on her abdomen, neck, feet/ankles. It was very itchy, erythematous.     It has improved, but comes and goes every three days. She continues to take the medications that she was prescribed by the     First time she had it she was coming home from [place] about 3 years ago. It does not seem to occur during certain seasons. The longest gap without the rash was about  3 months, but can occur as frequently as twice weekly.  No scratchy throat or shortness of breath.       Review of Systems         Objective    BP (!) 144/84   Pulse 64   Temp 98.1  F (36.7  C) (Oral)   Resp 18   LMP 04/05/2023 (Exact Date)   SpO2 98%   There is no height or weight on file to calculate BMI.  Physical Exam   GENERAL: healthy, alert and no distress  EYES: Eyes grossly normal to inspection, conjunctivae and sclerae normal  RESP: No increased work of breathing  CV: No cyanosis  MS: no gross musculoskeletal defects noted, no edema  SKIN: no suspicious lesions or rashes  PSYCH: mentation appears normal, affect normal/bright

## 2023-06-28 ENCOUNTER — APPOINTMENT (OUTPATIENT)
Dept: INTERPRETER SERVICES | Facility: CLINIC | Age: 53
End: 2023-06-28
Payer: COMMERCIAL

## 2023-06-29 ENCOUNTER — PATIENT OUTREACH (OUTPATIENT)
Dept: CARE COORDINATION | Facility: CLINIC | Age: 53
End: 2023-06-29
Payer: COMMERCIAL

## 2023-06-29 ENCOUNTER — ANCILLARY PROCEDURE (OUTPATIENT)
Dept: MAMMOGRAPHY | Facility: CLINIC | Age: 53
End: 2023-06-29
Attending: FAMILY MEDICINE
Payer: COMMERCIAL

## 2023-06-29 DIAGNOSIS — Z12.31 VISIT FOR SCREENING MAMMOGRAM: ICD-10-CM

## 2023-06-29 PROCEDURE — 77067 SCR MAMMO BI INCL CAD: CPT

## 2023-06-29 NOTE — PROGRESS NOTES
Clinic Care Coordination Contact    Follow Up Progress Note      Assessment: I got a call from the pt, pt called stating that she was able to schedule an appointment with [facility] allergy appointment, but its not till November. Pt stated that she wants to get somewhere sooner. I told her that I will check with our referral person and see if we can get her in sooner.     I talked to Noemi, she was able to get the pt in next week with [facility] [place].    I called the pt and gave her the appointment date, time, address, and phone number.     Referral for (Test): Allergy and Asthma   Location/Place/Provider: [address] [place] [address], [address]. [place], [place]   Date/Time: 07/06/2023 at 8:40am   Phone: [phone]  Fax:   Additional information/prep.:   Scheduled by: Noemi    Care Gaps:    Health Maintenance Due   Topic Date Due     ADVANCE CARE PLANNING  Never done     DEPRESSION ACTION PLAN  Never done     Pneumococcal Vaccine: Pediatrics (0 to 5 Years) and At-Risk Patients (6 to 64 Years) (1 - PCV) Never done     ZOSTER IMMUNIZATION (1 of 2) Never done     MAMMO SCREENING  06/09/2023     PHQ-9  06/27/2023           Care Plans      Intervention/Education provided during outreach:               Plan:     Care Coordinator will follow up in

## 2023-07-21 ENCOUNTER — OFFICE VISIT (OUTPATIENT)
Dept: FAMILY MEDICINE | Facility: CLINIC | Age: 53
End: 2023-07-21
Payer: COMMERCIAL

## 2023-07-21 VITALS
HEIGHT: 62 IN | RESPIRATION RATE: 16 BRPM | BODY MASS INDEX: 24.84 KG/M2 | HEART RATE: 59 BPM | TEMPERATURE: 98 F | WEIGHT: 135 LBS | OXYGEN SATURATION: 99 % | DIASTOLIC BLOOD PRESSURE: 78 MMHG | SYSTOLIC BLOOD PRESSURE: 123 MMHG

## 2023-07-21 DIAGNOSIS — F33.0 MILD EPISODE OF RECURRENT DEPRESSIVE DISORDER (H): ICD-10-CM

## 2023-07-21 DIAGNOSIS — H04.552 OBSTRUCTION OF LEFT TEAR DUCT: ICD-10-CM

## 2023-07-21 DIAGNOSIS — L29.89 CHRONIC PRURITIC RASH IN ADULT: Primary | ICD-10-CM

## 2023-07-21 DIAGNOSIS — K51.30 ULCERATIVE RECTOSIGMOIDITIS WITHOUT COMPLICATION (H): ICD-10-CM

## 2023-07-21 PROCEDURE — 90471 IMMUNIZATION ADMIN: CPT | Performed by: FAMILY MEDICINE

## 2023-07-21 PROCEDURE — 99214 OFFICE O/P EST MOD 30 MIN: CPT | Mod: 25 | Performed by: FAMILY MEDICINE

## 2023-07-21 PROCEDURE — 90677 PCV20 VACCINE IM: CPT | Performed by: FAMILY MEDICINE

## 2023-07-21 ASSESSMENT — PATIENT HEALTH QUESTIONNAIRE - PHQ9: SUM OF ALL RESPONSES TO PHQ QUESTIONS 1-9: 9

## 2023-07-21 NOTE — PROGRESS NOTES
Assessment & Plan     Chronic pruritic rash in adult  Continue with medication regimen. Will reach out to [facility] to see if have availability sooner than 11/2023.    Ulcerative rectosigmoiditis without complication (H)  Continue with medication regimen. 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 Continue with medication regimen. Continue with therapy    Obstruction of left tear duct  Referral to Dr. King for possible procedure.  - Adult Eye  Referral                   No follow-ups on file.    Milli Cooper DO  [facility]    Vi White is a 53 year old, presenting for the following health issues:  Eye Problem (Watery eyes, possible referral/)        7/21/2023     3:40 PM   Additional Questions   Roomed by noe BERUMEN     1. Chronic pruritic rash in adult  Urticaria  Chronic reoccuring rash that itches. Has not discovered any particular trigger. She has an appointment with allergy specialty on 11-16-23. She is wondering if there is someplace to get into sooner. Medications for symptoms are moderately helpful. .     2. Ulcerative rectosigmoiditis without complication (H)  Ulcerative rectosigmoiditis with rectal bleeding (H)  Symptoms greatly improved, stable.    Has been in to see GI in April and sees them every 6 months.    3. Left eye is watery: Eye doctor referral is needed for insurance. Dr. King at [facility]. Vision is clear. Seems to have a tear duct that is blocked.     4. Depression: PHQ-9 today. Has been taking paroxetine but not everyday (3-4 times a week in the evening). Also trazodone intermittently for sleep 3 nights a week. Talks on phone with therapist every few months.      8/30/2022     1:50 PM 12/27/2022    12:00 PM 7/21/2023     4:18 PM   PHQ   PHQ-9 Total Score 5 4 9   Q9: Thoughts of better off dead/self-harm past 2 weeks Not at all Not at all Not at all  "          Review of Systems         Objective    /78   Pulse 59   Temp 98  F (36.7  C) (Oral)   Resp 16   Ht 1.575 m (5' 2\")   Wt 61.2 kg (135 lb)   SpO2 99%   BMI 24.69 kg/m    Body mass index is 24.69 kg/m .  Physical Exam   GENERAL: healthy, alert and no distress  RESP: lungs clear to auscultation - no rales, rhonchi or wheezes  CV: regular rate and rhythm, normal S1 S2, no S3 or S4, no murmur, click or rub, no peripheral edema and peripheral pulses strong  MS: no gross musculoskeletal defects noted, no edema  SKIN: no suspicious lesions or rashes                          "

## 2023-07-21 NOTE — Clinical Note
Can you please send on eye referral to Dr. King on [address]? Can you please call [facility] and see when this patient could get an appointment from chronic urticaria? She does not want to wait until Nov with [facility]. Thanks! REY Cooper

## 2023-07-21 NOTE — PATIENT INSTRUCTIONS
Dental Clinics:     [facility]      [address] [place] [address]   [phone]        [facility]     [place] [address] [address]        [facility] ([facility])     [address] [place] [address]   [phone]        [facility]     [address] [place] [id number]   [phone]

## 2023-07-25 ENCOUNTER — TELEPHONE (OUTPATIENT)
Dept: FAMILY MEDICINE | Facility: CLINIC | Age: 53
End: 2023-07-25
Payer: COMMERCIAL

## 2023-07-25 NOTE — TELEPHONE ENCOUNTER
Joel dawson stating looks like Dr. Mayo is still prescribing medications for mental health, if patient continues to see psychiatry does not want duplicate prescriber prescribing same medications, if Dr. Cooper wants to continue to manage mental health please let her know. Or else she can take over prescribing psych medications.

## 2023-07-26 ENCOUNTER — APPOINTMENT (OUTPATIENT)
Dept: INTERPRETER SERVICES | Facility: CLINIC | Age: 53
End: 2023-07-26
Payer: COMMERCIAL

## 2023-07-26 NOTE — CONFIDENTIAL NOTE
I am unclear with this message. Is she seeing psychiatry at [facility] or just receiving therapy? If she is seeing psychiatry, do they want to prescribe her medications? The refill requests come to our clinic and are filled. I saw the patient recently and she did not say her medications have been changed by anyone.    Please confirm    OK, we will continue to prescribe medications.

## 2023-07-28 ENCOUNTER — TELEPHONE (OUTPATIENT)
Dept: FAMILY MEDICINE | Facility: CLINIC | Age: 53
End: 2023-07-28
Payer: COMMERCIAL

## 2023-07-28 NOTE — TELEPHONE ENCOUNTER
Patient is calling back again, and is now stating that its a procedure that she is needing because she keeps having excessive eye watering. Per patient explanation the procedure is dacryocystorhinostomy (bypass for the tear ducts). Patient is saying that the original referral that Dr. Cooper had sent over to Dr. King is only to check out her eye and not for a procedure that is asking for. Please call patient and advise, if needing more questions and clarifications. Thank you

## 2023-07-28 NOTE — TELEPHONE ENCOUNTER
[facility] phone call message- general phone call:    Reason for call: Patient is calling because Dr. Cooper had sent her referral to the [facility] eye clinic. She wanted to go to Dr. King, [facility] on [address] [place], [place]   [phone]. If Dr. Cooper can send the referral there instead and have someone on the team call her a call. Please call and advise, if needed.     Return call needed: Yes    OK to leave a message on voice mail? Yes    Primary language: Hmong      needed? Yes    Call taken on July 28, 2023 at 2:16 PM by Gavin Padilla

## 2023-08-01 ENCOUNTER — APPOINTMENT (OUTPATIENT)
Dept: INTERPRETER SERVICES | Facility: CLINIC | Age: 53
End: 2023-08-01
Payer: COMMERCIAL

## 2023-08-01 NOTE — TELEPHONE ENCOUNTER
Lm for patient to call me back. Referral is correct she needs a comprehensive eye exam to see if she will need the procedure. Can't just order a procedure for this as its an outside provider and no work up has been done to my knowledge. Please inform patient of this if she call back. She needs to call Dr. King's office to schedule an exam and Dr. King will decide if she needs the procedure.     Noemi Dunbar, CMA

## 2023-08-03 NOTE — TELEPHONE ENCOUNTER
Pt does see psychiatry at [facility] but  not therapy. Psychiatrist wanted to make sure that we were prescribing meds, as she didn't want to send over duplicate request and have two  prescribers. She is ok with primary filling mental health meds. If Dr. Cooper prefers her to prescribe let her know.

## 2023-09-26 ENCOUNTER — OFFICE VISIT (OUTPATIENT)
Dept: FAMILY MEDICINE | Facility: CLINIC | Age: 53
End: 2023-09-26
Payer: COMMERCIAL

## 2023-09-26 VITALS
OXYGEN SATURATION: 97 % | DIASTOLIC BLOOD PRESSURE: 80 MMHG | WEIGHT: 127.1 LBS | TEMPERATURE: 98.2 F | HEART RATE: 60 BPM | RESPIRATION RATE: 16 BRPM | BODY MASS INDEX: 23.25 KG/M2 | SYSTOLIC BLOOD PRESSURE: 134 MMHG

## 2023-09-26 DIAGNOSIS — L30.9 DERMATITIS: Primary | ICD-10-CM

## 2023-09-26 PROCEDURE — 99213 OFFICE O/P EST LOW 20 MIN: CPT | Performed by: PHYSICIAN ASSISTANT

## 2023-09-26 RX ORDER — TRIAMCINOLONE ACETONIDE 1 MG/G
CREAM TOPICAL 2 TIMES DAILY
Qty: 30 G | Refills: 0 | Status: SHIPPED | OUTPATIENT
Start: 2023-09-26 | End: 2023-10-10

## 2023-09-27 NOTE — PATIENT INSTRUCTIONS
Patient was educated on the natural course of rash.  Take medications as prescribed. Conservative measures discussed including Cetirizine. She tried prednisone and HC cream with no resolution. See your primary care provider if symptoms worsen or do not improve in 2 weeks. She has dermatology appointment in November. Seek emergency care if you develop severe pain/redness, shortness of breath, or difficulty swallowing.

## 2023-09-27 NOTE — PROGRESS NOTES
URGENT CARE VISIT:    SUBJECTIVE:   HPI:   Cindy Smith is a 53 year old who presents with rash located over neck since 5 day(s) ago. Rash is gradual onset and rash seems to be worsening. She describes rash as itching and red. Patient denies difficulty breathing or throat/tongue swelling. Patient has tried prednisone, HC cream, and Cetirizine with no relief of symptoms. Patient has not had contact exposures to new laundry detergents, soaps, lotions, or other potential irritants. Denies new foods or medications.  Patient has previous history of a similar rash. No one around them has had a similar rash.     PMH:   Past Medical History:   Diagnosis Date    Urinary frequency      Allergies: Patient has no known allergies.   Medications:   Current Outpatient Medications   Medication Sig Dispense Refill    albuterol (PROAIR HFA/PROVENTIL HFA/VENTOLIN HFA) 108 (90 Base) MCG/ACT inhaler Inhale 2 puffs into the lungs every 6 hours as needed for shortness of breath, wheezing or cough 18 g 0    ARIPiprazole (ABILIFY) 5 MG tablet Take 1 tablet (5 mg) by mouth daily 90 tablet 3    balsalazide (COLAZAL) 750 MG capsule Take 3 capsules (2,250 mg) by mouth 3 times daily 270 capsule 5    cetirizine (ZYRTEC) 10 MG tablet Take 1 tablet (10 mg) by mouth daily 30 tablet 2    Cholecalciferol (VITAMIN D3) 50 MCG (2000 UT) CAPS Take 2,000 Units by mouth daily 90 capsule 3    hydrOXYzine (ATARAX) 25 MG tablet Take 1 tablet (25 mg) by mouth 3 times daily as needed for itching 30 tablet 1    mesalamine (CANASA) 1000 MG suppository Place 1 suppository (1,000 mg) rectally At Bedtime 30 suppository 2    PARoxetine (PAXIL) 20 MG tablet Take 1 tablet (20 mg) by mouth At Bedtime 90 tablet 3    spacer (OPTICHAMBER GHANSHYAM) holding chamber Use with albuterol inhaler 1 each 0    traZODone (DESYREL) 50 MG tablet Take 1 tablet (50 mg) by mouth nightly as needed for sleep 90 tablet 3    triamcinolone (KENALOG) 0.1 % external cream Apply topically 2 times  daily for 14 days 30 g 0    triamcinolone (KENALOG) 0.1 % external cream Apply topically 2 times daily 45 g 1     Social History:   Social History     Socioeconomic History    Marital status:      Spouse name: Not on file    Number of children: Not on file    Years of education: Not on file    Highest education level: Not on file   Occupational History    Not on file   Tobacco Use    Smoking status: Never    Smokeless tobacco: Never   Vaping Use    Vaping Use: Not on file   Substance and Sexual Activity    Alcohol use: Not Currently     Comment: rarely    Drug use: No    Sexual activity: Not on file   Other Topics Concern    Not on file   Social History Narrative    Not on file     Social Determinants of Health     Financial Resource Strain: Not on file   Food Insecurity: Not on file   Transportation Needs: Not on file   Physical Activity: Not on file   Stress: Not on file   Social Connections: Not on file   Interpersonal Safety: Not on file   Housing Stability: Not on file       ROS: ROS otherwise found to be negative except as noted above.    OBJECTIVE:  /80   Pulse 60   Temp 98.2  F (36.8  C) (Oral)   Resp 16   Wt 57.7 kg (127 lb 1.6 oz)   LMP 09/04/2023   SpO2 97%   BMI 23.25 kg/m    General: WDWN in NAD.   Eyes: Non-injected conjunctivas without drainage bilaterally.  Ears: Bilateral TMs are easily visualized without erythema, injection, or effusion. No erythema or edema of external canals.    Oropharynx: No erythema of oropharynx. No edema of tongue.   Cardiac: RRR without murmurs, rubs, or gallops.  Respiratory: LCTAB without adventitious sounds. Non-labored breathing.  Integumentary:   Distribution: generalized  Location: neck    Color: red,  Lesion type: maculopapular, blotchy with warmth. Some excoriation present.  Neuro: Alert and oriented.           ASSESSMENT:     ICD-10-CM    1. Dermatitis  L30.9 triamcinolone (KENALOG) 0.1 % external cream           PLAN:  Patient Instructions    Patient was educated on the natural course of rash.  Take medications as prescribed. Conservative measures discussed including Cetirizine. She tried prednisone and HC cream with no resolution. See your primary care provider if symptoms worsen or do not improve in 2 weeks. She has dermatology appointment in November. Seek emergency care if you develop severe pain/redness, shortness of breath, or difficulty swallowing.    Patient verbalized understanding and is agreeable to plan. The patient was discharged ambulatory and in stable condition.    Yohana Ang PA-C on 9/26/2023 at 7:23 PM

## 2023-10-08 NOTE — PROGRESS NOTES
"  Assessment & Plan     {Diag Picklist:821017}    {[facility] 2021 Documentation (Optional):929199}  {2021 E&M time (Optional):539543}  {Provider  Link to [facility] Help Grid :033116}     {FOLLOW UP PLANS (Optional) Includes COVID19 Treatment Plan:468602}    No follow-ups on file.    Jennifer Venegas MD  [facility]    Vi White is a 52 year old{ACCOMPANIED BY STATEMENT (Optional):889425}, presenting for the following health issues:  RECHECK (Rash and itching) and Heart racing during PM and feeling tired      HPI     {SUPERLIST (Optional):510170}  {additonal problems for provider to add (Optional):429697}    Review of Systems   {ROS COMP (Optional):721942}      Objective    /77   Pulse 71   Temp 98  F (36.7  C)   Resp 20   Ht 1.549 m (5' 1\")   Wt 57.6 kg (127 lb)   LMP 12/02/2022   SpO2 97%   BMI 24.00 kg/m    Body mass index is 24 kg/m .  Physical Exam   {Exam List (Optional):083296}    {Diagnostic Test Results (Optional):505453}    {AMBULATORY ATTESTATION (Optional):465963}            " GI Consult requested for 79 Year old female patient with dislodged PEG tube. Patient was recently discharged from  two days ago after being admitted for clogged PEG tube.  As per patients  and care giver, Patient had last feeding at 8pm last night tolerated it well, it was discovered at 8am this morning by the patients caregiver that the PEG tube was dislodged.  states it must have happened some time after 8pm last night. Upon examination of the PEG tube site, the area appears closed. ED clinician tried to reinsert PEG tube unable to pass.    Attempt was made to reinsert the PEG tube without success.  Left foot fracture     Plan:   chart reviewed  xrays reviewed  recommend walking boot to immobolize left foot fractures   no podatric surgical intervention at this time   patient to follow up as out patient   please reconsult as needed

## 2023-10-17 ENCOUNTER — PATIENT OUTREACH (OUTPATIENT)
Dept: CARE COORDINATION | Facility: CLINIC | Age: 53
End: 2023-10-17
Payer: COMMERCIAL

## 2023-10-17 NOTE — PROGRESS NOTES
Clinic Care Coordination Contact  Program:  [facility]: [place]   Renewal: [employer]  Date Applied:     MARKOS Outreach:   10/17/23: 1st outreach attempt. Left a message on voicemail with call back information and requested return call.  Plan: CTA will call again within 2 weeks.  Candice Scott  Care   [facility]  Clinic Care Coordination  [phone]      Health Insurance:      Referral/Screening:

## 2023-10-30 ENCOUNTER — ALLIED HEALTH/NURSE VISIT (OUTPATIENT)
Dept: FAMILY MEDICINE | Facility: CLINIC | Age: 53
End: 2023-10-30
Payer: COMMERCIAL

## 2023-10-30 DIAGNOSIS — Z23 NEED FOR PROPHYLACTIC VACCINATION AND INOCULATION AGAINST INFLUENZA: Primary | ICD-10-CM

## 2023-10-30 PROCEDURE — 91320 SARSCV2 VAC 30MCG TRS-SUC IM: CPT

## 2023-10-30 PROCEDURE — 90480 ADMN SARSCOV2 VAC 1/ONLY CMP: CPT

## 2023-10-30 PROCEDURE — 99207 PR NO BILLABLE SERVICE THIS VISIT: CPT

## 2023-10-30 PROCEDURE — 90686 IIV4 VACC NO PRSV 0.5 ML IM: CPT

## 2023-10-30 PROCEDURE — 90471 IMMUNIZATION ADMIN: CPT

## 2023-10-31 ENCOUNTER — PATIENT OUTREACH (OUTPATIENT)
Dept: CARE COORDINATION | Facility: CLINIC | Age: 53
End: 2023-10-31
Payer: COMMERCIAL

## 2023-10-31 ENCOUNTER — APPOINTMENT (OUTPATIENT)
Dept: INTERPRETER SERVICES | Facility: CLINIC | Age: 53
End: 2023-10-31
Payer: COMMERCIAL

## 2023-10-31 NOTE — PROGRESS NOTES
Clinic Care Coordination Contact  Program:  [facility]: [place]   Renewal: [employer]ARE  Date Applied:     FRW Outreach:   10/31/23: 2nd outreach attempt. Left message on voicemail indicating last outreach attempt. CTA left [facility] number for renewal follow up.  Plan: CTA will no longer make outreach   Candice Llanos   [facility]  Clinic Care Coordination  [phone]    10/17/23: 1st outreach attempt. Left a message on voicemail with call back information and requested return call.  Plan: CTA will call again within 2 weeks.  Candice Llanos   [facility]  Clinic Care Coordination  [phone]      Health Insurance:      Referral/Screening:

## 2023-10-31 NOTE — PROGRESS NOTES
Clinic Care Coordination Contact  Program:  [facility]: [place]   Renewal: [employer]ARE  Date Applied:     FRW Outreach:   10/31/23: CTA called to see if patient needed assistance with their [employer] Renewal. Patient declined needing assistance and no follow up needed.  Pt's significant other called asking for application to be mailed out.   Candice Scott  Care   [facility] Care Coordination  [phone]    10/31/23: 2nd outreach attempt. Left message on voicemail indicating last outreach attempt. CTA left [facility] number for renewal follow up.  Plan: CTA will no longer make outreach   Candice Llanos   [facility]  Clinic Care Coordination  [phone]    10/17/23: 1st outreach attempt. Left a message on voicemail with call back information and requested return call.  Plan: CTA will call again within 2 weeks.  Candice Llanos   [facility] Care Coordination  [phone]      Health Insurance:      Referral/Screening:

## 2023-11-13 ENCOUNTER — OFFICE VISIT (OUTPATIENT)
Dept: FAMILY MEDICINE | Facility: CLINIC | Age: 53
End: 2023-11-13
Payer: COMMERCIAL

## 2023-11-13 VITALS
TEMPERATURE: 97 F | HEART RATE: 60 BPM | SYSTOLIC BLOOD PRESSURE: 138 MMHG | DIASTOLIC BLOOD PRESSURE: 81 MMHG | RESPIRATION RATE: 22 BRPM | WEIGHT: 128 LBS | HEIGHT: 61 IN | OXYGEN SATURATION: 98 % | BODY MASS INDEX: 24.17 KG/M2

## 2023-11-13 DIAGNOSIS — M25.511 CHRONIC RIGHT SHOULDER PAIN: Primary | ICD-10-CM

## 2023-11-13 DIAGNOSIS — G89.29 CHRONIC RIGHT SHOULDER PAIN: Primary | ICD-10-CM

## 2023-11-13 PROCEDURE — 99213 OFFICE O/P EST LOW 20 MIN: CPT | Mod: GC

## 2023-11-13 RX ORDER — NAPROXEN 500 MG/1
500 TABLET ORAL 2 TIMES DAILY WITH MEALS
Qty: 28 TABLET | Refills: 0 | Status: SHIPPED | OUTPATIENT
Start: 2023-11-13 | End: 2024-08-19

## 2023-11-13 NOTE — PROGRESS NOTES
"  Assessment & Plan     Chronic right shoulder pain  Patient presents with chronic right shoulder pain that has worsened over the past two months. No injury. Her mobility is limited by pain; her left shoulder has full ROM but the right shoulder is limited to about 100 degrees. Positive Dickson test, negative empty can test. Differential includes shoulder impingement versus rotator cuff tendonitis. Will start with two weeks of naproxen and initiation of therapy.   - naproxen (NAPROSYN) 500 MG tablet; Take 1 tablet (500 mg) by mouth 2 times daily (with meals)  - Physical Therapy Referral; Future  - [place] SIGN LANGUAGE INTERP, FACE TO FACE, PER 15 MNS    Return in about 4 weeks (around 12/11/2023).    Heather Cespedes MD  [facility]    Vi White is a 53 year old, presenting for the following health issues:  Arm Pain (Right side )        11/13/2023     4:03 PM   Additional Questions   Roomed by Damaris   Accompanied by VICTORINA BERUMEN     Right shoulder pain  - When did it start?   A couple of years ago it began on and off, but for two month it has been constant  - Where does it hurt?   Posterior shoulder  - Describe the pain?   Sharp pain   - Did you have an injury?   No   - History of this happening prior?   No    - Any skin changes, swelling or erythema?  No   - Does anything make the pain worse?      - Hard for her to lift her arm above her head    - Her shoulder is fine if she is not moving it   - What are you using to treat the pain?      - Taking tylenol for the pain, taking 1-2 pills   - Does this injury affect your work/ability to complete daily tasks?   Not currently working     Review of Systems   Constitutional, HEENT, cardiovascular, pulmonary, gi and gu systems are negative, except as otherwise noted.      Objective    /81   Pulse 60   Temp 97  F (36.1  C)   Resp 22   Ht 1.549 m (5' 1\")   Wt 58.1 kg (128 lb)   LMP 09/04/2023   SpO2 98%   BMI 24.19 kg/m    Body " mass index is 24.19 kg/m .  Physical Exam     Neck: full ROM. Negative Spurlings.  Shoulder:bilateral skin without erythema, swelling or ecchymosis. No atrophy; shoulders appear symmetric.  Non tender to palpation over the SC joint, clavicle, AC joint, or bicipital groove.   ROM right: Flexion to 100 deg. Abduction to 100 deg. Full internal and external rotation. No pain with cross body adduction. No winging of the scapula or scapular dyskinesis.  Strength: right 5/5 in abduction, external rotation, and internal rotation.   Special tests: Positive Dickson. Negative poured can testing.   Neuro: Sensation symmetric and intact.                Nasal Turnover Hinge Flap Text: The defect edges were debeveled with a #15 scalpel blade.  Given the size, depth, location of the defect and the defect being full thickness a nasal turnover hinge flap was deemed most appropriate. Using a sterile surgical marker, an appropriate hinge flap was drawn incorporating the defect. The area thus outlined was incised with a #15 scalpel blade. The flap was designed to recreate the nasal mucosal lining and the alar rim. The skin margins were undermined to an appropriate distance in all directions utilizing iris scissors. Following this, the designed flap was carried over into the primary defect and sutured into place

## 2023-11-13 NOTE — PROGRESS NOTES
Preceptor Attestation:  Patient's case reviewed and discussed with the resident, Heather Cespedes MD, and I personally evaluated the patient. I agree with written assessment and plan of care.    Supervising Physician:  Candi Dumont MD   [facility]

## 2023-11-16 ENCOUNTER — LAB (OUTPATIENT)
Dept: LAB | Facility: CLINIC | Age: 53
End: 2023-11-16
Payer: COMMERCIAL

## 2023-11-16 ENCOUNTER — OFFICE VISIT (OUTPATIENT)
Dept: ALLERGY | Facility: CLINIC | Age: 53
End: 2023-11-16
Attending: FAMILY MEDICINE
Payer: COMMERCIAL

## 2023-11-16 VITALS — HEIGHT: 60 IN | HEART RATE: 63 BPM | OXYGEN SATURATION: 97 % | WEIGHT: 128 LBS | BODY MASS INDEX: 25.13 KG/M2

## 2023-11-16 DIAGNOSIS — L50.8 CHRONIC AUTOIMMUNE URTICARIA: ICD-10-CM

## 2023-11-16 DIAGNOSIS — L50.9 URTICARIA: ICD-10-CM

## 2023-11-16 LAB
ALT SERPL W P-5'-P-CCNC: 13 U/L (ref 0–50)
AST SERPL W P-5'-P-CCNC: 23 U/L (ref 0–45)
BASOPHILS # BLD AUTO: 0 10E3/UL (ref 0–0.2)
BASOPHILS NFR BLD AUTO: 1 %
EOSINOPHIL # BLD AUTO: 0.4 10E3/UL (ref 0–0.7)
EOSINOPHIL NFR BLD AUTO: 6 %
ERYTHROCYTE [DISTWIDTH] IN BLOOD BY AUTOMATED COUNT: 12.6 % (ref 10–15)
HCT VFR BLD AUTO: 42.3 % (ref 35–47)
HGB BLD-MCNC: 14.2 G/DL (ref 11.7–15.7)
IMM GRANULOCYTES # BLD: 0 10E3/UL
IMM GRANULOCYTES NFR BLD: 0 %
LYMPHOCYTES # BLD AUTO: 1.8 10E3/UL (ref 0.8–5.3)
LYMPHOCYTES NFR BLD AUTO: 28 %
MCH RBC QN AUTO: 30.1 PG (ref 26.5–33)
MCHC RBC AUTO-ENTMCNC: 33.6 G/DL (ref 31.5–36.5)
MCV RBC AUTO: 90 FL (ref 78–100)
MONOCYTES # BLD AUTO: 0.3 10E3/UL (ref 0–1.3)
MONOCYTES NFR BLD AUTO: 5 %
NEUTROPHILS # BLD AUTO: 3.9 10E3/UL (ref 1.6–8.3)
NEUTROPHILS NFR BLD AUTO: 60 %
PLATELET # BLD AUTO: 205 10E3/UL (ref 150–450)
RBC # BLD AUTO: 4.71 10E6/UL (ref 3.8–5.2)
TSH SERPL DL<=0.005 MIU/L-ACNC: 1.07 UIU/ML (ref 0.3–4.2)
VIT D+METAB SERPL-MCNC: 21 NG/ML (ref 20–50)
WBC # BLD AUTO: 6.5 10E3/UL (ref 4–11)

## 2023-11-16 PROCEDURE — 84443 ASSAY THYROID STIM HORMONE: CPT

## 2023-11-16 PROCEDURE — 86003 ALLG SPEC IGE CRUDE XTRC EA: CPT | Mod: 90

## 2023-11-16 PROCEDURE — 82306 VITAMIN D 25 HYDROXY: CPT

## 2023-11-16 PROCEDURE — 36415 COLL VENOUS BLD VENIPUNCTURE: CPT

## 2023-11-16 PROCEDURE — 99243 OFF/OP CNSLTJ NEW/EST LOW 30: CPT | Performed by: ALLERGY & IMMUNOLOGY

## 2023-11-16 PROCEDURE — 83520 IMMUNOASSAY QUANT NOS NONAB: CPT

## 2023-11-16 PROCEDURE — 84450 TRANSFERASE (AST) (SGOT): CPT

## 2023-11-16 PROCEDURE — 84460 ALANINE AMINO (ALT) (SGPT): CPT

## 2023-11-16 PROCEDURE — 99000 SPECIMEN HANDLING OFFICE-LAB: CPT

## 2023-11-16 PROCEDURE — 85025 COMPLETE CBC W/AUTO DIFF WBC: CPT

## 2023-11-16 RX ORDER — CETIRIZINE HYDROCHLORIDE 10 MG/1
10 TABLET ORAL DAILY
Qty: 90 TABLET | Refills: 0 | Status: SHIPPED | OUTPATIENT
Start: 2023-11-16 | End: 2024-01-29

## 2023-11-16 NOTE — PATIENT INSTRUCTIONS
Chronic autoimmune hives    Cetirizine 10 mg up to 2 tabs twice daily     Start twice daily and then increase if needed    Stop every 4 weeks or so to see if hives have resolved    Check bloodwork    Notify if not controlled    Take pictures of hives

## 2023-11-16 NOTE — LETTER
11/16/2023         RE: Cindy Smith  [address] [place] [address]        Dear Colleague,    Thank you for referring your patient, Cindy Smith, to the [facility]. Please see a copy of my visit note below.          Subjective  Cindy is a 53 year old, presenting for the following health issues:  Allergy Consult (Hives, has had them for 2-3 years ago.also gets abdominal pain)    HPI     Chief complaint:  Hives    History of Present Illness:  This is a pleasant 53 year old woman that I was asked to see for evaluation of hives by Dr Donaldson.  Patient states that she has had hives for the past 2-3 year throughout the year.  She states that she has episodes 1-2 times per month.  Describes the lesions as red, raised lesions that last 7 days.  She has an ED report that states she had hives.  Somewhat difficult to determine how long the lesions were lasting.  She states they are very itchy.  She was given hydroxyzine in May prednisone but she states it did not seem to help.  She is not currently taking any allergy medication.  She does note occasional abdominal pain but not necessarily when the hives are present.  No history of reflux.  No joint pain.  No history of thyroid disease but she did have a thyroid level checked in December of last year that was normal.  She is not taking over-the-counter medications such as ibuprofen Aleve or aspirin.    Past medical history: Ulcerative rectosigmoiditis  , Depression    Social history: She is , no pets, non-smoker    Family history: Negative for hives    Review of Systems   Constitutional, HEENT, cardiovascular, pulmonary, gi and gu systems are negative, except as otherwise noted.      Objective   Pulse 63   Ht 1.524 m (5')   Wt 58.1 kg (128 lb)   LMP 09/04/2023   SpO2 97%   BMI 25.00 kg/m    Body mass index is 25 kg/m .  Physical Exam     Gen: Pleasant female not in acute distress  HEENT: Eyes no erythema of the bulbar or palpebral  conjunctiva, no edema. Ears: No deformities or lesions. Nose: No congestion,  Mouth: Throat clear, no lip or tongue edema.   Neck: No masses lesions or swelling  Respiratory: No coughing with breathing, no retractions  Lymph: No visible supraclavicular or cervical lymphadenopathy  Skin: No rashes or lesions  Psych: Alert and appropriate for age    Impression report and plan:    1.  Chronic urticaria    The patient has chronic urticaria.  Chronic urticaria is not due to a specific allergen.  Recommend systemic work-up including TSH, CBC with differential, AST, ALT, alpha gal, vitamin D level, tryptase.Many patients with chronic urticaria resolve within a few years.  Reviewed exacerbating and concerning signs of chronic urticaria.  Recommended cetirizine up to 2 tablets twice daily.  Notify symptoms are not well controlled.  I asked her to take a picture of the hives given that she states that the lesions last 7 days.  I suspect this is more the episode.  Follow as needed.                Again, thank you for allowing me to participate in the care of your patient.        Sincerely,        Jessica OLIVEIRA MD

## 2023-11-16 NOTE — PROGRESS NOTES
Vi White is a 53 year old, presenting for the following health issues:  Allergy Consult (Hives, has had them for 2-3 years ago.also gets abdominal pain)    HPI     Chief complaint:  Hives    History of Present Illness:  This is a pleasant 53 year old woman that I was asked to see for evaluation of hives by Dr Donaldson.  Patient states that she has had hives for the past 2-3 year throughout the year.  She states that she has episodes 1-2 times per month.  Describes the lesions as red, raised lesions that last 7 days.  She has an ED report that states she had hives.  Somewhat difficult to determine how long the lesions were lasting.  She states they are very itchy.  She was given hydroxyzine in May prednisone but she states it did not seem to help.  She is not currently taking any allergy medication.  She does note occasional abdominal pain but not necessarily when the hives are present.  No history of reflux.  No joint pain.  No history of thyroid disease but she did have a thyroid level checked in December of last year that was normal.  She is not taking over-the-counter medications such as ibuprofen Aleve or aspirin.    Past medical history: Ulcerative rectosigmoiditis  , Depression    Social history: She is , no pets, non-smoker    Family history: Negative for hives    Review of Systems   Constitutional, HEENT, cardiovascular, pulmonary, gi and gu systems are negative, except as otherwise noted.      Objective    Pulse 63   Ht 1.524 m (5')   Wt 58.1 kg (128 lb)   LMP 09/04/2023   SpO2 97%   BMI 25.00 kg/m    Body mass index is 25 kg/m .  Physical Exam     Gen: Pleasant female not in acute distress  HEENT: Eyes no erythema of the bulbar or palpebral conjunctiva, no edema. Ears: No deformities or lesions. Nose: No congestion,  Mouth: Throat clear, no lip or tongue edema.   Neck: No masses lesions or swelling  Respiratory: No coughing with breathing, no retractions  Lymph: No visible  supraclavicular or cervical lymphadenopathy  Skin: No rashes or lesions  Psych: Alert and appropriate for age    Impression report and plan:    1.  Chronic urticaria    The patient has chronic urticaria.  Chronic urticaria is not due to a specific allergen.  Recommend systemic work-up including TSH, CBC with differential, AST, ALT, alpha gal, vitamin D level, tryptase.Many patients with chronic urticaria resolve within a few years.  Reviewed exacerbating and concerning signs of chronic urticaria.  Recommended cetirizine up to 2 tablets twice daily.  Notify symptoms are not well controlled.  I asked her to take a picture of the hives given that she states that the lesions last 7 days.  I suspect this is more the episode.  Follow as needed.

## 2023-11-17 LAB
ALPHA-GAL IGE QN: 0.11 KU/L
DEPRECATED MISC ALLERGEN IGE RAST QL: NORMAL

## 2023-11-21 ENCOUNTER — APPOINTMENT (OUTPATIENT)
Dept: INTERPRETER SERVICES | Facility: CLINIC | Age: 53
End: 2023-11-21
Payer: COMMERCIAL

## 2023-11-21 ENCOUNTER — TELEPHONE (OUTPATIENT)
Dept: ALLERGY | Facility: CLINIC | Age: 53
End: 2023-11-21
Payer: COMMERCIAL

## 2023-11-21 DIAGNOSIS — E55.9 VITAMIN D DEFICIENCY: Primary | ICD-10-CM

## 2023-11-21 LAB — TRYPTASE SERPL-MCNC: 5.1 UG/L

## 2023-11-21 RX ORDER — ERGOCALCIFEROL 1.25 MG/1
50000 CAPSULE, LIQUID FILLED ORAL WEEKLY
Qty: 8 CAPSULE | Refills: 0 | Status: SHIPPED | OUTPATIENT
Start: 2023-11-21 | End: 2024-01-15 | Stop reason: ALTCHOICE

## 2023-11-21 NOTE — TELEPHONE ENCOUNTER
BOBBY to call back to speak with allergy staff about results.    Let patient know that all results except vitamin D returned normal.  Vitamin D should be around 30 and was 21.  Recommend 50,000 units weekly vitamin D for 8 weeks and then recheck level.   vitamin D was sent to [facility]

## 2023-11-27 ENCOUNTER — APPOINTMENT (OUTPATIENT)
Dept: INTERPRETER SERVICES | Facility: CLINIC | Age: 53
End: 2023-11-27
Payer: COMMERCIAL

## 2023-11-28 ENCOUNTER — THERAPY VISIT (OUTPATIENT)
Dept: PHYSICAL THERAPY | Facility: REHABILITATION | Age: 53
End: 2023-11-28
Attending: FAMILY MEDICINE
Payer: COMMERCIAL

## 2023-11-28 DIAGNOSIS — M25.511 CHRONIC RIGHT SHOULDER PAIN: ICD-10-CM

## 2023-11-28 DIAGNOSIS — G89.29 CHRONIC RIGHT SHOULDER PAIN: ICD-10-CM

## 2023-11-28 DIAGNOSIS — R29.898 WEAKNESS OF RIGHT UPPER EXTREMITY: ICD-10-CM

## 2023-11-28 DIAGNOSIS — M25.611 DECREASED ROM OF RIGHT SHOULDER: Primary | ICD-10-CM

## 2023-11-28 PROCEDURE — 97140 MANUAL THERAPY 1/> REGIONS: CPT | Mod: GP | Performed by: PHYSICAL THERAPIST

## 2023-11-28 PROCEDURE — 97161 PT EVAL LOW COMPLEX 20 MIN: CPT | Mod: GP | Performed by: PHYSICAL THERAPIST

## 2023-11-28 PROCEDURE — 97110 THERAPEUTIC EXERCISES: CPT | Mod: GP | Performed by: PHYSICAL THERAPIST

## 2023-11-28 NOTE — PROGRESS NOTES
PHYSICAL THERAPY EVALUATION  Type of Visit: Evaluation    See electronic medical record for Abuse and Falls Screening details.    Patient reports she has had pain for some time, no injury reported. In the last 2 months the pain increased without a specific.   Limited ROM, pain at the end, no pain at rest, can not sleep on her right shoulder. Patient is right handed.       Subjective       Presenting condition or subjective complaint: qright shoulder pain  Date of onset: 11/13/23    Relevant medical history:     Dates & types of surgery: hip surgery 2018-19    Prior diagnostic imaging/testing results:       Prior therapy history for the same diagnosis, illness or injury: No      Prior Level of Function  Transfers: Independent  Ambulation: Independent, Assistive equipment  ADL: Independent, Assistive person    Living Environment  Social support: With family members   Type of home: House; 2-story   Stairs to enter the home: Yes 4 Is there a railing: Yes   Ramp: No   Stairs inside the home: Yes   Is there a railing: Yes   Help at home: Self Cares (home health aide/personal care attendant, family, etc); Home and Yard maintenance tasks; Home management tasks (cooking, cleaning)  Equipment owned: Walker with wheels; Crutches     Employment: No    Hobbies/Interests: gardening    Patient goals for therapy: ADL, bathing, dressing    Pain assessment: See objective evaluation for additional pain details     Objective   SHOULDER EVALUATION  PAIN: Pain Level at Rest: 1/10  Pain Level with Use: 10/10  Pain Location: shoulder  Pain is Exacerbated By: reaching up or behind her back   Pain is Relieved By: NSAIDs and rest  Pain Progression: Unchanged  INTEGUMENTARY (edema, incisions): WNL  POSTURE: Sitting Posture: Rounded shoulders    ROM:   (Degrees) Left AROM Right AROM  Right PROM   Shoulder Flexion 168 93    Shoulder Extension 68  40     Shoulder Abduction 160  76    Shoulder Internal Rotation  Functional reaching behind back to  lateral hip    Shoulder External Rotation  Functional reach behind head unable to get to back of head     Elbow Extension WNL WNL    Elbow Flexion WNL WNL      STRENGTH:  general weakness throughout R UE with MMT, limited by pain in all motions including  strength     SPECIAL TESTS:    Right   Impingement    Neer's Positive   Hawkin's-Kehinde Positive   Labral    Schoolcraft's Negative    Instability    Apprehension (anterior) Negative    Relocation (anterior) Negative    Multi-Directional Instability     Sulcus Negative    Rotator Cuff Tear    Drop Arm Negative      PALPATION:  tender to supraspinatus and bicep tender, generally tender in upper traps   JOINT MOBILITY:  mild hypomobility to the inferior and posterior mobs on right side   CERVICAL SCREEN:  full ROM of cervical spine noted overall without increase in pain today, noted with increase in right shoulder pain she can have L sided neck pain     Assessment & Plan   CLINICAL IMPRESSIONS  Medical Diagnosis: Chronic Right Shoulder Pain    Treatment Diagnosis: Chronic right shoulder pain, decrease ROM of right shoulder, weakness of right upper extremity   Impression/Assessment: Patient is a 53 year old female with complaints of acute on chronic right shoulder pain that started 2 months ago and has been getting worse with pain and ROM overall.  The following significant findings have been identified: Pain, Decreased ROM/flexibility, Decreased joint mobility, Decreased strength, Impaired muscle performance, Decreased activity tolerance, and Impaired posture. These impairments interfere with their ability to perform self care tasks, recreational activities, and household chores as compared to previous level of function.     Clinical Decision Making (Complexity):  Clinical Presentation: Stable/Uncomplicated  Clinical Presentation Rationale: based on medical and personal factors listed in PT evaluation  Clinical Decision Making (Complexity): Low complexity    PLAN OF  CARE  Treatment Interventions:  Interventions: Manual Therapy, Neuromuscular Re-education, Therapeutic Activity, Therapeutic Exercise, Self-Care/Home Management    Long Term Goals     PT Goal 1  Goal Identifier: HEP  Goal Description: Patient will be independent in a HEP for ongoing symptom managment in 12 weeks  Target Date: 02/20/24  PT Goal 2  Goal Identifier: ROM  Goal Description: Patient will increase AROM of shoulder in flexion to >120 deg  for increase ease in reaching up, washing her hair and other ADLs in 12 weeks  Target Date: 02/20/24  PT Goal 3  Goal Identifier: Sleep  Goal Description: Patient will be able to lay on her right side and sleep without increase in pain in 12 weeks  Target Date: 02/20/24      Frequency of Treatment: 1 time per week  Duration of Treatment: 12 weeks    Recommended Referrals to Other Professionals:  none  Education Assessment:   Learner/Method: Patient;Listening;Demonstration;Pictures/Video;No Barriers to Learning    Risks and benefits of evaluation/treatment have been explained.   Patient/Family/caregiver agrees with Plan of Care.     Evaluation Time:     PT Eval, Low Complexity Minutes (70058): 30   Present: Yes: Language: Hmong, ID Number/Identifier: Tria; #[id number]     Signing Clinician: Rachel Mohamud, PT      [facility]                                                                                   OUTPATIENT PHYSICAL THERAPY      PLAN OF TREATMENT FOR OUTPATIENT REHABILITATION   Patient's Last Name, First Name, M.I.  SarahCindy maria    YOB: 1970   Provider's Name   [facility]   Medical Record No.  [id number]     Onset Date: 11/13/23  Start of Care Date: 11/28/23     Medical Diagnosis:  Chronic Right Shoulder Pain      PT Treatment Diagnosis:  Chronic right shoulder pain, decrease ROM of right shoulder, weakness of right upper extremity Plan of Treatment  Frequency/Duration: 1 time per week/  12 weeks    Certification date from 11/28/23 to 02/20/24         See note for plan of treatment details and functional goals     Rachel Mohamud, PT                         I CERTIFY THE NEED FOR THESE SERVICES FURNISHED UNDER        THIS PLAN OF TREATMENT AND WHILE UNDER MY CARE     (Physician attestation of this document indicates review and certification of the therapy plan).              Referring Provider:  Candi Dumont    Initial Assessment  See Epic Evaluation- Start of Care Date: 11/28/23

## 2023-11-30 ENCOUNTER — APPOINTMENT (OUTPATIENT)
Dept: INTERPRETER SERVICES | Facility: CLINIC | Age: 53
End: 2023-11-30
Payer: COMMERCIAL

## 2023-11-30 NOTE — TELEPHONE ENCOUNTER
Used  to call patient and he verbalized the message. She has already started the RX, started Monday, and will have her blood level drawn after the 8 doses.

## 2023-12-06 ENCOUNTER — THERAPY VISIT (OUTPATIENT)
Dept: PHYSICAL THERAPY | Facility: REHABILITATION | Age: 53
End: 2023-12-06
Payer: COMMERCIAL

## 2023-12-06 DIAGNOSIS — G89.29 CHRONIC RIGHT SHOULDER PAIN: Primary | ICD-10-CM

## 2023-12-06 DIAGNOSIS — R29.898 WEAKNESS OF RIGHT UPPER EXTREMITY: ICD-10-CM

## 2023-12-06 DIAGNOSIS — M25.611 DECREASED ROM OF RIGHT SHOULDER: ICD-10-CM

## 2023-12-06 DIAGNOSIS — M25.511 CHRONIC RIGHT SHOULDER PAIN: Primary | ICD-10-CM

## 2023-12-06 PROCEDURE — 97110 THERAPEUTIC EXERCISES: CPT | Mod: GP | Performed by: PHYSICAL THERAPIST

## 2023-12-08 DIAGNOSIS — E55.9 VITAMIN D DEFICIENCY: ICD-10-CM

## 2023-12-08 RX ORDER — ERGOCALCIFEROL 1.25 MG/1
50000 CAPSULE, LIQUID FILLED ORAL WEEKLY
Qty: 8 CAPSULE | OUTPATIENT
Start: 2023-12-08

## 2023-12-08 NOTE — TELEPHONE ENCOUNTER
It looks like this prescription was just sent in by Dr. Rothman on 11- for 8 capsules. It should be taken once a week and therefore last 8 weeks. Did she not pick it up? Or did she take it more frequently than once a week?

## 2023-12-08 NOTE — TELEPHONE ENCOUNTER
[facility] phone call message- medication clarification/question:    Full Medication Name: vitamin D2 (ERGOCALCIFEROL) 24576 units (1250 mcg) capsule     Question: Patient requesting refill on medication listed above. Please refill or else call and advise patient.      Pharmacy confirmed as [facility] - [place], [place] - [facility] [address]: Yes    OK to leave a message on voice mail? Yes    Primary language: Hmong      needed? Yes    Call taken on December 8, 2023 at 3:14 PM by Gisell Medina

## 2023-12-13 NOTE — TELEPHONE ENCOUNTER
Patient states she wanted a continuation of the vitamin after the 8 weeks is done - she is taking accordingly to instruction.

## 2023-12-15 ENCOUNTER — THERAPY VISIT (OUTPATIENT)
Dept: PHYSICAL THERAPY | Facility: REHABILITATION | Age: 53
End: 2023-12-15
Payer: COMMERCIAL

## 2023-12-15 DIAGNOSIS — R29.898 WEAKNESS OF RIGHT UPPER EXTREMITY: ICD-10-CM

## 2023-12-15 DIAGNOSIS — M25.611 DECREASED ROM OF RIGHT SHOULDER: ICD-10-CM

## 2023-12-15 DIAGNOSIS — M25.511 CHRONIC RIGHT SHOULDER PAIN: Primary | ICD-10-CM

## 2023-12-15 DIAGNOSIS — G89.29 CHRONIC RIGHT SHOULDER PAIN: Primary | ICD-10-CM

## 2023-12-15 PROCEDURE — 97110 THERAPEUTIC EXERCISES: CPT | Mod: GP

## 2023-12-15 PROCEDURE — 97140 MANUAL THERAPY 1/> REGIONS: CPT | Mod: GP

## 2023-12-26 ENCOUNTER — THERAPY VISIT (OUTPATIENT)
Dept: PHYSICAL THERAPY | Facility: REHABILITATION | Age: 53
End: 2023-12-26
Attending: FAMILY MEDICINE
Payer: COMMERCIAL

## 2023-12-26 DIAGNOSIS — M25.611 DECREASED ROM OF RIGHT SHOULDER: ICD-10-CM

## 2023-12-26 DIAGNOSIS — G89.29 CHRONIC RIGHT SHOULDER PAIN: Primary | ICD-10-CM

## 2023-12-26 DIAGNOSIS — M25.511 CHRONIC RIGHT SHOULDER PAIN: Primary | ICD-10-CM

## 2023-12-26 DIAGNOSIS — R29.898 WEAKNESS OF RIGHT UPPER EXTREMITY: ICD-10-CM

## 2023-12-26 PROCEDURE — 97140 MANUAL THERAPY 1/> REGIONS: CPT | Mod: GP | Performed by: PHYSICAL THERAPIST

## 2023-12-26 PROCEDURE — 97110 THERAPEUTIC EXERCISES: CPT | Mod: GP | Performed by: PHYSICAL THERAPIST

## 2024-01-02 ENCOUNTER — THERAPY VISIT (OUTPATIENT)
Dept: PHYSICAL THERAPY | Facility: REHABILITATION | Age: 54
End: 2024-01-02
Attending: FAMILY MEDICINE
Payer: COMMERCIAL

## 2024-01-02 DIAGNOSIS — M25.611 DECREASED ROM OF RIGHT SHOULDER: ICD-10-CM

## 2024-01-02 DIAGNOSIS — G89.29 CHRONIC RIGHT SHOULDER PAIN: Primary | ICD-10-CM

## 2024-01-02 DIAGNOSIS — R29.898 WEAKNESS OF RIGHT UPPER EXTREMITY: ICD-10-CM

## 2024-01-02 DIAGNOSIS — M25.511 CHRONIC RIGHT SHOULDER PAIN: Primary | ICD-10-CM

## 2024-01-02 PROCEDURE — 97110 THERAPEUTIC EXERCISES: CPT | Mod: GP | Performed by: PHYSICAL THERAPIST

## 2024-01-02 PROCEDURE — 97140 MANUAL THERAPY 1/> REGIONS: CPT | Mod: GP | Performed by: PHYSICAL THERAPIST

## 2024-01-09 ENCOUNTER — THERAPY VISIT (OUTPATIENT)
Dept: PHYSICAL THERAPY | Facility: REHABILITATION | Age: 54
End: 2024-01-09
Payer: COMMERCIAL

## 2024-01-09 DIAGNOSIS — R29.898 WEAKNESS OF RIGHT UPPER EXTREMITY: ICD-10-CM

## 2024-01-09 DIAGNOSIS — M25.611 DECREASED ROM OF RIGHT SHOULDER: ICD-10-CM

## 2024-01-09 DIAGNOSIS — M25.511 CHRONIC RIGHT SHOULDER PAIN: Primary | ICD-10-CM

## 2024-01-09 DIAGNOSIS — G89.29 CHRONIC RIGHT SHOULDER PAIN: Primary | ICD-10-CM

## 2024-01-09 PROCEDURE — 97110 THERAPEUTIC EXERCISES: CPT | Mod: GP | Performed by: PHYSICAL THERAPIST

## 2024-01-09 PROCEDURE — 97140 MANUAL THERAPY 1/> REGIONS: CPT | Mod: GP | Performed by: PHYSICAL THERAPIST

## 2024-01-14 NOTE — PROGRESS NOTES
"  Assessment & Plan     Chronic right shoulder pain  Continue with exercises at home and with physical therapy, probably consistent with impingement syndrome, since ongoing and not improving with therapy will obtain imaging.  - XR Shoulder Right G/E 3 Views  - MR Shoulder Right w/o Contrast    Ulcerative rectosigmoiditis with rectal bleeding (H)  Recommend she see her GI doctor this spring    Recurrent major depression in complete remission (H24)  Stable on her medications     Vitamin D deficiency  - Cholecalciferol (VITAMIN D3) 50 MCG (2000 UT) CAPS  Dispense: 90 capsule; Refill: 3                  No follow-ups on file.    Vi White is a 53 year old, presenting for the following health issues:  RECHECK (Follow up shoulder pain)        1/15/2024     8:22 AM   Additional Questions   Roomed by noe    present    HPI             Chronic right shoulder pain- has been going to physical therapy. Is right handed. Has been using Ibuprofen. Her therapist wanted to be re-evaluated as she does not seem to be progressing with her exercises.    2. Mild depression:  She is feeling well right now. Takes her paroxetine and abilify regularly. She did recently decrease her paroxetine from 20mg to 10mg daily.  PHQ-9       12/27/2022    12:00 PM 7/21/2023     4:18 PM 1/15/2024     8:20 AM   PHQ   PHQ-9 Total Score 4 9 0   Q9: Thoughts of better off dead/self-harm past 2 weeks Not at all Not at all Not at all       3. Ulcerative sigmoiditis:  Sees GI, however her last appointment was 4/2023. Has not had any symptoms. Takes her medication regularly. Earlier in the year, she needed 2 months of mesalamine, but is not on that medication now.    4. HCM: Normal mammogram 6/2023, will be due 7/2024. Has had a breast augmentation in the past            Review of Systems         Objective    BP (!) 142/79   Pulse 65   Temp 98.2  F (36.8  C) (Oral)   Resp 16   Ht 1.59 m (5' 2.6\")   Wt 60.8 kg (134 lb)   SpO2 95%   BMI " 24.04 kg/m    Body mass index is 24.04 kg/m .  Physical Exam   GENERAL: alert and no distress  RESP: lungs clear to auscultation - no rales, rhonchi or wheezes  CV: regular rate and rhythm, normal S1 S2, no S3 or S4, no murmur, click or rub, no peripheral edema  MS: no gross musculoskeletal defects noted, no edema, abduction to 90 degrees, some weakness on right side due to pain    Xray - Reviewed and interpreted by me.  Normal shoulder xray

## 2024-01-15 ENCOUNTER — ANCILLARY PROCEDURE (OUTPATIENT)
Dept: GENERAL RADIOLOGY | Facility: CLINIC | Age: 54
End: 2024-01-15
Attending: FAMILY MEDICINE
Payer: COMMERCIAL

## 2024-01-15 ENCOUNTER — OFFICE VISIT (OUTPATIENT)
Dept: FAMILY MEDICINE | Facility: CLINIC | Age: 54
End: 2024-01-15
Payer: COMMERCIAL

## 2024-01-15 VITALS
TEMPERATURE: 98.2 F | OXYGEN SATURATION: 95 % | BODY MASS INDEX: 23.74 KG/M2 | WEIGHT: 134 LBS | HEIGHT: 63 IN | SYSTOLIC BLOOD PRESSURE: 142 MMHG | RESPIRATION RATE: 16 BRPM | DIASTOLIC BLOOD PRESSURE: 79 MMHG | HEART RATE: 65 BPM

## 2024-01-15 DIAGNOSIS — M25.511 CHRONIC RIGHT SHOULDER PAIN: ICD-10-CM

## 2024-01-15 DIAGNOSIS — E55.9 VITAMIN D DEFICIENCY: ICD-10-CM

## 2024-01-15 DIAGNOSIS — F33.42 RECURRENT MAJOR DEPRESSION IN COMPLETE REMISSION (H): ICD-10-CM

## 2024-01-15 DIAGNOSIS — G89.29 CHRONIC RIGHT SHOULDER PAIN: Primary | ICD-10-CM

## 2024-01-15 DIAGNOSIS — K51.311 ULCERATIVE RECTOSIGMOIDITIS WITH RECTAL BLEEDING (H): ICD-10-CM

## 2024-01-15 DIAGNOSIS — G89.29 CHRONIC RIGHT SHOULDER PAIN: ICD-10-CM

## 2024-01-15 DIAGNOSIS — M25.511 CHRONIC RIGHT SHOULDER PAIN: Primary | ICD-10-CM

## 2024-01-15 PROCEDURE — 99213 OFFICE O/P EST LOW 20 MIN: CPT | Performed by: FAMILY MEDICINE

## 2024-01-15 PROCEDURE — 73030 X-RAY EXAM OF SHOULDER: CPT | Mod: TC | Performed by: RADIOLOGY

## 2024-01-15 RX ORDER — ACETAMINOPHEN 160 MG
2000 TABLET,DISINTEGRATING ORAL DAILY
Qty: 90 CAPSULE | Refills: 3 | Status: SHIPPED | OUTPATIENT
Start: 2024-01-15 | End: 2024-07-19

## 2024-01-15 ASSESSMENT — PATIENT HEALTH QUESTIONNAIRE - PHQ9
SUM OF ALL RESPONSES TO PHQ QUESTIONS 1-9: 0
SUM OF ALL RESPONSES TO PHQ QUESTIONS 1-9: 0
10. IF YOU CHECKED OFF ANY PROBLEMS, HOW DIFFICULT HAVE THESE PROBLEMS MADE IT FOR YOU TO DO YOUR WORK, TAKE CARE OF THINGS AT HOME, OR GET ALONG WITH OTHER PEOPLE: NOT DIFFICULT AT ALL

## 2024-01-17 ENCOUNTER — APPOINTMENT (OUTPATIENT)
Dept: INTERPRETER SERVICES | Facility: CLINIC | Age: 54
End: 2024-01-17
Payer: COMMERCIAL

## 2024-01-18 PROBLEM — F33.1 MODERATE EPISODE OF RECURRENT MAJOR DEPRESSIVE DISORDER (H): Status: RESOLVED | Noted: 2018-10-23 | Resolved: 2024-01-18

## 2024-01-18 RX ORDER — PAROXETINE 10 MG/1
10 TABLET, FILM COATED ORAL EVERY MORNING
COMMUNITY
Start: 2024-01-10

## 2024-01-29 ENCOUNTER — HOSPITAL ENCOUNTER (OUTPATIENT)
Dept: MRI IMAGING | Facility: HOSPITAL | Age: 54
Discharge: HOME | End: 2024-01-29
Attending: FAMILY MEDICINE | Admitting: FAMILY MEDICINE
Payer: COMMERCIAL

## 2024-01-29 ENCOUNTER — HOSPITAL ENCOUNTER (EMERGENCY)
Facility: HOSPITAL | Age: 54
Discharge: HOME | End: 2024-01-29
Attending: EMERGENCY MEDICINE | Admitting: EMERGENCY MEDICINE
Payer: COMMERCIAL

## 2024-01-29 VITALS
OXYGEN SATURATION: 97 % | HEART RATE: 63 BPM | TEMPERATURE: 98.1 F | HEIGHT: 61 IN | WEIGHT: 134 LBS | BODY MASS INDEX: 25.3 KG/M2 | DIASTOLIC BLOOD PRESSURE: 67 MMHG | RESPIRATION RATE: 16 BRPM | SYSTOLIC BLOOD PRESSURE: 128 MMHG

## 2024-01-29 DIAGNOSIS — L50.8 CHRONIC AUTOIMMUNE URTICARIA: ICD-10-CM

## 2024-01-29 DIAGNOSIS — R21 RASH: ICD-10-CM

## 2024-01-29 DIAGNOSIS — M25.511 CHRONIC RIGHT SHOULDER PAIN: ICD-10-CM

## 2024-01-29 DIAGNOSIS — G89.29 CHRONIC RIGHT SHOULDER PAIN: ICD-10-CM

## 2024-01-29 DIAGNOSIS — L29.89 CHRONIC PRURITIC RASH IN ADULT: ICD-10-CM

## 2024-01-29 PROCEDURE — 99283 EMERGENCY DEPT VISIT LOW MDM: CPT

## 2024-01-29 PROCEDURE — 73221 MRI JOINT UPR EXTREM W/O DYE: CPT | Mod: RT

## 2024-01-29 RX ORDER — TRIAMCINOLONE ACETONIDE 1 MG/G
CREAM TOPICAL 2 TIMES DAILY
Qty: 45 G | Refills: 1 | Status: SHIPPED | OUTPATIENT
Start: 2024-01-29 | End: 2024-02-01

## 2024-01-29 RX ORDER — CETIRIZINE HYDROCHLORIDE 10 MG/1
10 TABLET ORAL 2 TIMES DAILY
Qty: 90 TABLET | Refills: 0 | Status: SHIPPED | OUTPATIENT
Start: 2024-01-29 | End: 2024-02-01

## 2024-01-29 NOTE — ED PROVIDER NOTES
EMERGENCY DEPARTMENT ENCOUNTER      NAME: Cindy Smith  AGE: 53 year old female  YOB: 1970  MRN: [id number]  EVALUATION DATE & TIME: 2024  4:13 PM    PCP: Milli Cooper    ED PROVIDER: El Duke MD      Chief Complaint   Patient presents with    Rash         FINAL IMPRESSION:  1. Rash    2. Chronic autoimmune urticaria    3. Chronic pruritic rash in adult          ED COURSE & MEDICAL DECISION MAKIN:00 PM I met with the patient, obtained history, performed physical exam, and discussed ED plan. We discussed the plan for discharge and the patient is agreeable. Reviewed supportive cares, symptomatic treatment, outpatient follow up, and reasons to return to the Emergency Department. Patient to be discharged by ED RN.     Pertinent Labs & Imaging studies reviewed. (See chart for details)  53 year old female presents to the Emergency Department for evaluation of rash.     Patient has history of chronic urticaria was seen by allergist in November and prescribed Zyrtec and told to follow-up if rash persisted.  Patient states she was unaware she was post to follow-up.  Ran out of Zyrtec.  Not currently using the steroid creams.  Rash returned and is itchy.    On exam has some erythematous patches.    Anaphylaxis unlikely.    Viral infection unlikely.    Patient's care is complicated by access to medical care    Plan to resume Zyrtec 10 mg twice daily, resume triamcinolone cream which I ordered.  I created referral for dermatology.  Also recommend patient follow-up with allergist again as well as primary care doctor             Medical Decision Making  Obtained supplemental history:Supplemental history obtained?: Documented in chart  Reviewed external records: External records reviewed?: Outpatient Record: 23 allergy clinic  Care impacted by chronic illness:N/A  Care significantly affected by social determinants of health:N/A  Did you consider but not order tests?: Work up considered but  not performed and documented in chart, if applicable  Did you interpret images independently?: Independent interpretation of ECG and images noted in documentation, when applicable.  Consultation discussion with other provider:Did you involve another provider (consultant, , pharmacy, etc.)?: No  Discharge. I prescribed additional prescription strength medication(s) as charted. N/A.    At the conclusion of the encounter I discussed the results of all of the tests and the disposition. The questions were answered. The patient or family acknowledged understanding and was agreeable with the care plan.         MEDICATIONS GIVEN IN THE EMERGENCY:  Medications - No data to display    NEW PRESCRIPTIONS STARTED AT TODAY'S ER VISIT  Discharge Medication List as of 1/29/2024  4:13 PM             =================================================================    HPI    Patient information was obtained from: patient    Use of : Yes (Photetica) - Language Debbielance Smith is a 53 year old female with a pertinent history of chronic urticaria who presents to this ED via walk-in for evaluation of rash. For the past week, the patient has had an itchy rash on her nausea and abdomen radiating down her legs bilaterally. She has had this chronically for the past 3 years. The rash is red, patchy, and dry. The patient visited the allergy clinic and was given 1 month of Zyrtec 2 times per day. She did not know she was supposed to follow-up if her rash were to develop again. Creams such as Benadryl have not palliated the rash. No additional complaints at this time.    Per chart review,  11/16/23 The patient visited the allergy clinic for evaluation of hives. The patient has chronic urticaria. Chronic urticaria is not due to a specific allergen. Recommend systemic work-up including TSH, CBC with differential, AST, ALT, alpha gal, vitamin D level, tryptase.Many patients with chronic urticaria resolve within a few years.  Recommended cetirizine up to 2 tablets twice daily.     REVIEW OF SYSTEMS   Review of Systems   Skin:  Positive for rash.        PAST MEDICAL HISTORY:  Past Medical History:   Diagnosis Date    Urinary frequency        PAST SURGICAL HISTORY:  Past Surgical History:   Procedure Laterality Date    MAMMOPLASTY AUGMENTATION Bilateral 2009    TOTAL HIP ARTHROPLASTY Right            CURRENT MEDICATIONS:    cetirizine (ZYRTEC) 10 MG tablet  triamcinolone (KENALOG) 0.1 % external cream  ARIPiprazole (ABILIFY) 5 MG tablet  balsalazide (COLAZAL) 750 MG capsule  Cholecalciferol (VITAMIN D3) 50 MCG (2000 UT) CAPS  hydrOXYzine (ATARAX) 25 MG tablet  naproxen (NAPROSYN) 500 MG tablet  PARoxetine (PAXIL) 10 MG tablet  spacer (OPTICHAMBER GHANSHYAM) holding chamber  traZODone (DESYREL) 50 MG tablet        ALLERGIES:  No Known Allergies    FAMILY HISTORY:  Family History   Problem Relation Age of Onset    Breast Cancer No family hx of     Diabetes No family hx of     Hypertension No family hx of     Hyperlipidemia No family hx of     Cerebrovascular Disease No family hx of     Coronary Artery Disease No family hx of        SOCIAL HISTORY:   Social History     Socioeconomic History    Marital status:    Tobacco Use    Smoking status: Never    Smokeless tobacco: Never   Substance and Sexual Activity    Alcohol use: Not Currently     Comment: rarely    Drug use: No     Social Determinants of Health     Financial Resource Strain: Low Risk  (1/15/2024)    Financial Resource Strain     Within the past 12 months, have you or your family members you live with been unable to get utilities (heat, electricity) when it was really needed?: No   Food Insecurity: Low Risk  (1/15/2024)    Food Insecurity     Within the past 12 months, did you worry that your food would run out before you got money to buy more?: No     Within the past 12 months, did the food you bought just not last and you didn t have money to get more?: No   Transportation  "Needs: Low Risk  (1/15/2024)    Transportation Needs     Within the past 12 months, has lack of transportation kept you from medical appointments, getting your medicines, non-medical meetings or appointments, work, or from getting things that you need?: No   Interpersonal Safety: Low Risk  (1/15/2024)    Interpersonal Safety     Do you feel physically and emotionally safe where you currently live?: Yes     Within the past 12 months, have you been hit, slapped, kicked or otherwise physically hurt by someone?: No     Within the past 12 months, have you been humiliated or emotionally abused in other ways by your partner or ex-partner?: No   Housing Stability: Low Risk  (1/15/2024)    Housing Stability     Do you have housing? : Yes     Are you worried about losing your housing?: No       VITALS:  /67   Pulse 63   Temp 98.1  F (36.7  C) (Oral)   Resp 16   Ht 1.549 m (5' 1\")   Wt 60.8 kg (134 lb)   SpO2 97%   BMI 25.32 kg/m      PHYSICAL EXAM      Vitals: /67   Pulse 63   Temp 98.1  F (36.7  C) (Oral)   Resp 16   Ht 1.549 m (5' 1\")   Wt 60.8 kg (134 lb)   SpO2 97%   BMI 25.32 kg/m    General: Appears in no acute distress, awake, alert, interactive.  Eyes: Conjunctivae non-injected. Sclera anicteric.  HENT: Atraumatic.  Neck: Supple.  Respiratory/Chest: Respiration unlabored.  Abdomen: non distended  Musculoskeletal: Normal extremities. No edema or erythema.  Skin: Rash to left side of neck, see photo, rash to abdomen see photo rash see photographs.   Neurologic: Face symmetric, moves all extremities spontaneously. Speech clear.  Psychiatric: Oriented to person, place, and time. Affect appropriate.                  LAB:  All pertinent labs reviewed and interpreted.       RADIOLOGY:  Reviewed all pertinent imaging. Please see official radiology report.  No orders to display         Brown MESA, am serving as a scribe to document services personally performed by El Duke MD based on my " observation and the provider's statements to me. I, El Duke MD, attest that Brown Sumner is acting in a scribe capacity, has observed my performance of the services and has documented them in accordance with my direction.    El Duke MD  [facility] EMERGENCY DEPARTMENT  [address] [place] [id number]  [phone]      El Duke MD  01/29/24 1904

## 2024-01-29 NOTE — ED TRIAGE NOTES
Patient presents here with a scattered rash that is on her neck and right lower abdominal area. She has had a similar rash, but does not know the source. It is patchy and dry. She has taken a prescription medication with no relief or reduction of rash.      Triage Assessment (Adult)       Row Name 01/29/24 1409          Triage Assessment    Airway WDL WDL        Respiratory WDL    Respiratory WDL WDL        Skin Circulation/Temperature WDL    Skin Circulation/Temperature WDL WDL        Cardiac WDL    Cardiac WDL WDL        Peripheral/Neurovascular WDL    Peripheral Neurovascular WDL WDL        Cognitive/Neuro/Behavioral WDL    Cognitive/Neuro/Behavioral WDL WDL

## 2024-01-29 NOTE — DISCHARGE INSTRUCTIONS
Restart zyrtec twice daily and follow up with Dr. Rothman, Allergist. Apply the triamcinolone cream twice a day as well. I also referred you a dermatologist.

## 2024-01-30 ENCOUNTER — PATIENT OUTREACH (OUTPATIENT)
Dept: CARE COORDINATION | Facility: CLINIC | Age: 54
End: 2024-01-30

## 2024-01-30 ENCOUNTER — TELEPHONE (OUTPATIENT)
Dept: DERMATOLOGY | Facility: CLINIC | Age: 54
End: 2024-01-30

## 2024-01-30 NOTE — PROGRESS NOTES
Clinic Care Coordination Contact  Follow Up Progress Note      Assessment:  The pt was recently in the ED, I called to check up on the pt and help the pt setup a ED follow up. The pt was at [facility] for a rash. I called the pt,but got her vm,so I left a vm for the pt to give me a call back.       Care Gaps:    Health Maintenance Due   Topic Date Due    ADVANCE CARE PLANNING  Never done    DEPRESSION ACTION PLAN  Never done    ZOSTER IMMUNIZATION (1 of 2) Never done    HEPATITIS B IMMUNIZATION (3 of 3 - 19+ 3-dose series) 08/21/2023    YEARLY PREVENTIVE VISIT  08/30/2023    DTAP/TDAP/TD IMMUNIZATION (2 - Td or Tdap) 12/10/2023           Care Plans      Intervention/Education provided during outreach:               Plan:     Care Coordinator will follow up in

## 2024-01-30 NOTE — TELEPHONE ENCOUNTER
[facility] Call Center    Phone Message    May a detailed message be left on voicemail: no     Reason for Call: Appointment Intake    Referring Provider Name: El Duke MD @ [facility] Emergency Department  Diagnosis and/or Symptoms: Rash [R21]    Hospital follow up, routing to clinic per protocols    Action Taken: Message routed to:  [facility]    Travel Screening: Not Applicable

## 2024-01-31 ENCOUNTER — PATIENT OUTREACH (OUTPATIENT)
Dept: CARE COORDINATION | Facility: CLINIC | Age: 54
End: 2024-01-31
Payer: COMMERCIAL

## 2024-01-31 NOTE — PROGRESS NOTES
I got a call back from the pt, pt stated that she is doing ok. Pt stated that she is following up with dermatology about it, since she has been having these rash so often.

## 2024-01-31 NOTE — PROGRESS NOTES
[facility] Dermatology Note  Encounter Date: Feb 1, 2024  Office Visit     Dermatology Problem List:  # Eczematous dermatitis  - Current treatment: Fluocinonide 0.05% ointment twice daily x 2 weeks, and triamcinolone 0 point percent cream twice daily for maintenance  # Possible chronic idiopathic urticaria  - Current: Fexofenadine 180 mg twice daily  - Prior: Zyrtec 10 mg twice daily    ____________________________________________    Assessment & Plan:     # Eczematous dermatitis  Patient describes itchy, dry plaques occurring on the abdomen and neck for the past 2 years.  This is consistent with what is observed on examination today.  Patient feels that these findings are representative of her usual cutaneous disease.  Given this, it is uncertain if the patient truly has chronic urticaria.  We will proceed with treating the eczematous dermatitis with topical corticosteroids and gentle skin care and simultaneously empirically continue to treat for urticaria given that this cannot be ruled out on examination today.  - Start fluocinonide 0.05% ointment twice daily for 2 weeks to affected areas on the left neck and abdomen  - Following this, resume triamcinolone 0.1% cream twice daily to affected areas.  454 g jar provided  - Gentle skin care discussed in detail.  Handout provided    # Chronic idiopathic urticaria.  As above, uncertain if patient has concomitant urticaria in addition to her eczematous dermatitis.  We will treat empirically today and change from Zyrtec to Allegra.  Also recommended patient to discontinue naproxen if the prescribing provider is amenable to this.  - Stop Zyrtec  - Start Allegra 180 mg twice daily  - Consider stopping naproxen if appropriate    Procedures Performed:   -None    Follow-up: 4 months with me in person, sooner with concerns    Staff: Dr. Treasure Thompson MD PGY-3  Dermatology Resident  I, Jodi Amanda MD, saw this patient with the  resident and agree with the resident s findings and plan of care as documented in the resident s note.    ____________________________________________    CC: Derm Problem (Patient reports a new nate developing on their neck and underneath their breasts. The patient reports itchiness at these sites. )    HPI:  Ms. Cindy Smith is a(n) 53 year old female who presents today as a new patient for evaluation of a skin rash.     Patient has a history of chronic idiopathic urticaria. Was evaluated by Dr. Rothman on 11/16/23, who performed systemic workup including TSH, vit D, alpha gal, tryptase, CBC with diff which was normal with the exception of slightly low vitamin D. She was treated with cetirizine up to 2 tablets BID.     Pt presented to the ED on 1/29 for hives. Was recommended to resume zyrtec 10 mg BID, resume triamcinolone cream and referred to dermatology.    Patient takes naproxen 500 mg BID (on her med list, patient is unsure if she is taking this medication). Has hx of ulcerative rectosigmoiditis and takes balsalazide.     Today, patient states that she started having issue with with her skin approximately 2 years ago.  She has an itchy, scaly plaque on her left neck as well as itchy papules on the abdomen.  She thinks that these current lesions are representative of her normal cutaneous disease.  They will often stay present for 3 to 4 days at a time.  They are quite itchy.  She continues to take cetirizine 10 mg twice daily.  Felt that the triamcinolone cream does not help the rash much.     Patient is otherwise feeling well, without additional skin concerns.    Labs Reviewed:  As per HPI    Physical Exam:  Vitals: There were no vitals taken for this visit.  SKIN: Focused examination of the neck, upper chest, back, abdomen, flanks was performed.  Patient declined examination of the legs or other anatomical regions today.  -On the abdomen there are dry, scaly excoriated papules with some overlying hemorrhagic  crust  - On the left neck there is a lichenified, scaly pink plaque  - No urticarial lesions visualized on examined skin  - No other lesions of concern on areas examined.     Medications:  Current Outpatient Medications   Medication    ARIPiprazole (ABILIFY) 5 MG tablet    balsalazide (COLAZAL) 750 MG capsule    Cholecalciferol (VITAMIN D3) 50 MCG (2000 UT) CAPS    fexofenadine (ALLEGRA) 180 MG tablet    fluocinonide (LIDEX) 0.05 % external ointment    hydrOXYzine (ATARAX) 25 MG tablet    naproxen (NAPROSYN) 500 MG tablet    PARoxetine (PAXIL) 10 MG tablet    spacer (OPTICHAMBER GHANSHYAM) holding chamber    traZODone (DESYREL) 50 MG tablet    triamcinolone (KENALOG) 0.1 % external cream     No current facility-administered medications for this visit.      Past Medical History:   Patient Active Problem List   Diagnosis    Proctitis    Vitamin D deficiency    Abnormal mammogram    Degenerative joint disease (DJD) of hip    Pterygium of eye, left    Tinnitus of both ears    Chronic pruritic rash in adult    Ulcerative rectosigmoiditis without complication (H)    Chronic right shoulder pain    Decreased ROM of right shoulder    Weakness of right upper extremity     Past Medical History:   Diagnosis Date    Urinary frequency        CC No referring provider defined for this encounter. on close of this encounter.

## 2024-01-31 NOTE — PROGRESS NOTES
Clinic Care Coordination Contact  Follow Up Progress Note      Assessment:  The pt was recently in the ED, I called to check up on the pt and help the pt setup a ED follow up. The pt was at [facility] for a rash. I called the pt,but got her vm,so I left a vm for the pt to give me a call back.        Care Gaps:    Health Maintenance Due   Topic Date Due    ADVANCE CARE PLANNING  Never done    DEPRESSION ACTION PLAN  Never done    GLUCOSE  Never done    ZOSTER IMMUNIZATION (1 of 2) Never done    HEPATITIS B IMMUNIZATION (3 of 3 - 19+ 3-dose series) 08/21/2023    YEARLY PREVENTIVE VISIT  08/30/2023    DTAP/TDAP/TD IMMUNIZATION (2 - Td or Tdap) 12/10/2023           Care Plans      Intervention/Education provided during outreach:               Plan:     Care Coordinator will follow up in

## 2024-02-01 ENCOUNTER — OFFICE VISIT (OUTPATIENT)
Dept: DERMATOLOGY | Facility: CLINIC | Age: 54
End: 2024-02-01
Payer: MEDICAID

## 2024-02-01 DIAGNOSIS — L30.8 OTHER ECZEMA: Primary | ICD-10-CM

## 2024-02-01 DIAGNOSIS — L50.9 URTICARIA: ICD-10-CM

## 2024-02-01 PROCEDURE — 99204 OFFICE O/P NEW MOD 45 MIN: CPT | Mod: GC | Performed by: DERMATOLOGY

## 2024-02-01 RX ORDER — TRIAMCINOLONE ACETONIDE 1 MG/G
CREAM TOPICAL 2 TIMES DAILY
Qty: 454 G | Refills: 0 | Status: SHIPPED | OUTPATIENT
Start: 2024-02-01

## 2024-02-01 RX ORDER — FEXOFENADINE HCL 180 MG/1
180 TABLET ORAL 2 TIMES DAILY
Qty: 120 TABLET | Refills: 2 | Status: SHIPPED | OUTPATIENT
Start: 2024-02-01

## 2024-02-01 RX ORDER — FLUOCINONIDE 0.5 MG/G
OINTMENT TOPICAL
Qty: 60 G | Refills: 1 | Status: SHIPPED | OUTPATIENT
Start: 2024-02-01

## 2024-02-01 ASSESSMENT — PAIN SCALES - GENERAL: PAINLEVEL: NO PAIN (0)

## 2024-02-01 NOTE — NURSING NOTE
Chief Complaint   Patient presents with    Derm Problem     Patient reports a new nate developing on their neck and underneath their breasts. The patient reports itchiness at these sites.      Ruth Thomas LPN

## 2024-02-01 NOTE — LETTER
2/1/2024       RE: Cindy Smith  [address] [place] [address]     Dear Colleague,    Thank you for referring your patient, Cindy Smith, to the [facility] [place] at . Please see a copy of my visit note below.    [facility] Dermatology Note  Encounter Date: Feb 1, 2024  Office Visit     Dermatology Problem List:  # Eczematous dermatitis  - Current treatment: Fluocinonide 0.05% ointment twice daily x 2 weeks, and triamcinolone 0 point percent cream twice daily for maintenance  # Possible chronic idiopathic urticaria  - Current: Fexofenadine 180 mg twice daily  - Prior: Zyrtec 10 mg twice daily    ____________________________________________    Assessment & Plan:     # Eczematous dermatitis  Patient describes itchy, dry plaques occurring on the abdomen and neck for the past 2 years.  This is consistent with what is observed on examination today.  Patient feels that these findings are representative of her usual cutaneous disease.  Given this, it is uncertain if the patient truly has chronic urticaria.  We will proceed with treating the eczematous dermatitis with topical corticosteroids and gentle skin care and simultaneously empirically continue to treat for urticaria given that this cannot be ruled out on examination today.  - Start fluocinonide 0.05% ointment twice daily for 2 weeks to affected areas on the left neck and abdomen  - Following this, resume triamcinolone 0.1% cream twice daily to affected areas.  454 g jar provided  - Gentle skin care discussed in detail.  Handout provided    # Chronic idiopathic urticaria.  As above, uncertain if patient has concomitant urticaria in addition to her eczematous dermatitis.  We will treat empirically today and change from Zyrtec to Allegra.  Also recommended patient to discontinue naproxen if the prescribing provider is amenable to this.  - Stop Zyrtec  - Start  Allegra 180 mg twice daily  - Consider stopping naproxen if appropriate    Procedures Performed:   -None    Follow-up: 4 months with me in person, sooner with concerns    Staff: Dr. Treasure Thompson MD PGY-3  Dermatology Resident  I, Jodi Amanda MD, saw this patient with the resident and agree with the resident s findings and plan of care as documented in the resident s note.    ____________________________________________    CC: Derm Problem (Patient reports a new nate developing on their neck and underneath their breasts. The patient reports itchiness at these sites. )    HPI:  Ms. Cindy Smith is a(n) 53 year old female who presents today as a new patient for evaluation of a skin rash.     Patient has a history of chronic idiopathic urticaria. Was evaluated by Dr. Rothman on 11/16/23, who performed systemic workup including TSH, vit D, alpha gal, tryptase, CBC with diff which was normal with the exception of slightly low vitamin D. She was treated with cetirizine up to 2 tablets BID.     Pt presented to the ED on 1/29 for hives. Was recommended to resume zyrtec 10 mg BID, resume triamcinolone cream and referred to dermatology.    Patient takes naproxen 500 mg BID (on her med list, patient is unsure if she is taking this medication). Has hx of ulcerative rectosigmoiditis and takes balsalazide.     Today, patient states that she started having issue with with her skin approximately 2 years ago.  She has an itchy, scaly plaque on her left neck as well as itchy papules on the abdomen.  She thinks that these current lesions are representative of her normal cutaneous disease.  They will often stay present for 3 to 4 days at a time.  They are quite itchy.  She continues to take cetirizine 10 mg twice daily.  Felt that the triamcinolone cream does not help the rash much.     Patient is otherwise feeling well, without additional skin concerns.    Labs Reviewed:  As per HPI    Physical Exam:  Vitals: There  were no vitals taken for this visit.  SKIN: Focused examination of the neck, upper chest, back, abdomen, flanks was performed.  Patient declined examination of the legs or other anatomical regions today.  -On the abdomen there are dry, scaly excoriated papules with some overlying hemorrhagic crust  - On the left neck there is a lichenified, scaly pink plaque  - No urticarial lesions visualized on examined skin  - No other lesions of concern on areas examined.     Medications:  Current Outpatient Medications   Medication    ARIPiprazole (ABILIFY) 5 MG tablet    balsalazide (COLAZAL) 750 MG capsule    Cholecalciferol (VITAMIN D3) 50 MCG (2000 UT) CAPS    fexofenadine (ALLEGRA) 180 MG tablet    fluocinonide (LIDEX) 0.05 % external ointment    hydrOXYzine (ATARAX) 25 MG tablet    naproxen (NAPROSYN) 500 MG tablet    PARoxetine (PAXIL) 10 MG tablet    spacer (OPTICHAMBER GHANSHYAM) holding chamber    traZODone (DESYREL) 50 MG tablet    triamcinolone (KENALOG) 0.1 % external cream     No current facility-administered medications for this visit.      Past Medical History:   Patient Active Problem List   Diagnosis    Proctitis    Vitamin D deficiency    Abnormal mammogram    Degenerative joint disease (DJD) of hip    Pterygium of eye, left    Tinnitus of both ears    Chronic pruritic rash in adult    Ulcerative rectosigmoiditis without complication (H)    Chronic right shoulder pain    Decreased ROM of right shoulder    Weakness of right upper extremity     Past Medical History:   Diagnosis Date    Urinary frequency        CC No referring provider defined for this encounter. on close of this encounter.

## 2024-02-01 NOTE — PATIENT INSTRUCTIONS
Gentle Skin Care Recommendations    Bathing   - limit showers to once daily, 15 minutes or less, with warm water   - use gentle soaps that are free of colors and scents and are not too strong of cleansers  - consider limiting soap to only the 'dirty' areas, like the armpits  and groin as much as possible to prevent stripping your skin in other areas of their natural moisture  - do not vigorously scrub when cleansing  - avoid any soaps with microbeads  - after showering, pat your skin gently dry with a towel  - make sure you are applying a good moisturizer after bathing every time (see below)  - do not take bubble baths as many of these products contain harsh chemicals that can irritate the skin    Examples of gentle cleansers:   - Mild bar soaps: Vanicream bar soap, Neutragena glycerin bar, Dove sensitive skin (fragrance-free)   - Mild liquid soaps: Vanicream liquid cleanser, Cerave liquid cleanser    Moisturizing     It is important to moisturize at least twice per day to the whole body. IMMEDIATELY after getting out of the shower, apply a moisturizer (preferably from the list below) to the entire body. If you have been prescribed any medications, apply those medications to the skin first and then you can apply the moisturizer on top.    Moisturizers come in a variety of types some of which are greasier, heavier, or lighter. The heaviest moisturizers are ointments, which are greasy like vaseline. The next best are creams, which are usually white and thick but absorb into the skin a little better. The lightest are lotions which are typically thin and contain more ingredients which can be irritating to your skin. For this reason, we do NOT recommend lotions.     Examples of good moisturizers:   - Ointments: vaseline, Cerave healing ointment, Vaniply, coconut oil   - Creams: Cerave, Vanicream, Neutrogena Norwegian Formula Hand Cream     Laundry     Be sure to use laundry products that are free of dyes and  "fragrances--many laundry products that claim to be fragrance-free actually are not free of these! Do not use laundry softeners or dryer sheets.     Good laundry products include:  - 7th generation Natural Laundry Detergent Liquid, 7th Generation Free and Clear Natural Laundry Detergent packs, 7th Generation Free and Clear natural 4x Concentrated Laundry Detergent, ECOS hypoallergenic laundry detergent, free & clear, ERA Ultra Era Free Liquid Laundry Detergent, All Free & Clear     Other Gentle Skin Recommendations    - Do not use products such as powders, perfumes, or colognes on your skin  - Avoid saunas and steam baths - these temperatures are too hot   - Avoid tight or  scratchy  clothing such as wool  - Always wash new clothing before wearing them for the first time  - Sometimes a humidifier or vaporizer, used at night can help the dry skin - but remember to keep it clean to void mold growth.  - Avoid applying essential oils to the skin or diffusing in the home (Many essential oils can be irritate to the skin!   - Keep in mind, just because something is \"natural\" it does not mean that it cannot irritate your skin (for example, poison ivy is natural, but will cause a painful rash!)    "

## 2024-02-15 ENCOUNTER — TELEPHONE (OUTPATIENT)
Dept: DERMATOLOGY | Facility: CLINIC | Age: 54
End: 2024-02-15
Payer: COMMERCIAL

## 2024-02-25 ENCOUNTER — HOSPITAL ENCOUNTER (EMERGENCY)
Facility: HOSPITAL | Age: 54
Discharge: HOME | End: 2024-02-25
Attending: EMERGENCY MEDICINE | Admitting: EMERGENCY MEDICINE
Payer: MEDICAID

## 2024-02-25 VITALS
SYSTOLIC BLOOD PRESSURE: 130 MMHG | WEIGHT: 130 LBS | TEMPERATURE: 97.6 F | HEART RATE: 62 BPM | DIASTOLIC BLOOD PRESSURE: 68 MMHG | RESPIRATION RATE: 16 BRPM | HEIGHT: 61 IN | BODY MASS INDEX: 24.55 KG/M2 | OXYGEN SATURATION: 100 %

## 2024-02-25 DIAGNOSIS — N89.8 VAGINAL PRURITUS: ICD-10-CM

## 2024-02-25 LAB
ALBUMIN UR-MCNC: NEGATIVE MG/DL
AMORPH CRY #/AREA URNS HPF: ABNORMAL /HPF
APPEARANCE UR: CLEAR
BILIRUB UR QL STRIP: NEGATIVE
CLUE CELLS: ABNORMAL
COLOR UR AUTO: ABNORMAL
GLUCOSE UR STRIP-MCNC: NEGATIVE MG/DL
HGB UR QL STRIP: NEGATIVE
KETONES UR STRIP-MCNC: NEGATIVE MG/DL
LEUKOCYTE ESTERASE UR QL STRIP: NEGATIVE
MUCOUS THREADS #/AREA URNS LPF: PRESENT /LPF
NITRATE UR QL: NEGATIVE
PH UR STRIP: 8 [PH] (ref 5–7)
RBC URINE: <1 /HPF
SP GR UR STRIP: 1.02 (ref 1–1.03)
SQUAMOUS EPITHELIAL: <1 /HPF
TRICHOMONAS, WET PREP: ABNORMAL
UROBILINOGEN UR STRIP-MCNC: <2 MG/DL
WBC URINE: 2 /HPF
WBC'S/HIGH POWER FIELD, WET PREP: ABNORMAL
YEAST, WET PREP: ABNORMAL

## 2024-02-25 PROCEDURE — 250N000013 HC RX MED GY IP 250 OP 250 PS 637

## 2024-02-25 PROCEDURE — 87591 N.GONORRHOEAE DNA AMP PROB: CPT

## 2024-02-25 PROCEDURE — 99283 EMERGENCY DEPT VISIT LOW MDM: CPT

## 2024-02-25 PROCEDURE — 87491 CHLMYD TRACH DNA AMP PROBE: CPT

## 2024-02-25 PROCEDURE — 87210 SMEAR WET MOUNT SALINE/INK: CPT

## 2024-02-25 PROCEDURE — 81001 URINALYSIS AUTO W/SCOPE: CPT

## 2024-02-25 RX ORDER — FEXOFENADINE HCL 180 MG/1
180 TABLET ORAL ONCE
Status: COMPLETED | OUTPATIENT
Start: 2024-02-25 | End: 2024-02-25

## 2024-02-25 RX ADMIN — FEXOFENADINE HCL 180 MG: 180 TABLET ORAL at 18:17

## 2024-02-25 ASSESSMENT — ACTIVITIES OF DAILY LIVING (ADL)
ADLS_ACUITY_SCORE: 35

## 2024-02-25 ASSESSMENT — COLUMBIA-SUICIDE SEVERITY RATING SCALE - C-SSRS
6. HAVE YOU EVER DONE ANYTHING, STARTED TO DO ANYTHING, OR PREPARED TO DO ANYTHING TO END YOUR LIFE?: NO
1. IN THE PAST MONTH, HAVE YOU WISHED YOU WERE DEAD OR WISHED YOU COULD GO TO SLEEP AND NOT WAKE UP?: NO
2. HAVE YOU ACTUALLY HAD ANY THOUGHTS OF KILLING YOURSELF IN THE PAST MONTH?: NO

## 2024-02-25 NOTE — ED TRIAGE NOTES
Patient reports that she has had vaginal itching/burning for 10 days.  Used the OTC Monostat for 7 days, no change in sxs

## 2024-02-25 NOTE — ED PROVIDER NOTES
Emergency Department Encounter  [facility] EMERGENCY DEPARTMENT  Cindy Smith   AGE: 53 year old SEX: female  YOB: 1970  MRN: [id number]      EVALUATION DATE & TIME: 2/25/2024  4:27 PM ; PCP: Milli Cooper   ED PROVIDER: Sallie Savage PA-C    CHIEF COMPLAINT: Vaginal Itching      MEDICAL DECISION MAKING   Cindy Smith is a 53 year old female with a pertinent history of chronic pruritic rash who presents to the ED for evaluation of vaginal itching for the past 10 days.  Patient has been seen by dermatology recently and diagnosed with chronic urticaria but patient reports she has not had symptoms in her vaginal area prior.  She has not been using the medications prescribed to her by dermatology.  No vaginal bleeding or vaginal discharge.  She initially thought this was a yeast infection and has taken 3-day course of miconazole so far without relief of symptoms.  Denies concerns for STIs.  No dyspareunia, dysuria, increased urinary frequency, fever, chills, or abdominal pain.  No new soaps or exposures.    Vitals reviewed and hemodynamically stable, afebrile.  On exam, patient is willing.  No acute distress.  There is no evidence of lesions, rash, abscess, or petechiae on the body.  No oral lesions.  Rodrigo exam revealed normally developed external female genitalia without external lesions or eruptions.  There is no excoriation marks.  Vagina and cervix are without lesions, inflammation, discharge, or tenderness.  Last menstrual period was 1 week ago, patient still having regular menstrual periods.    The exact cause of patient's symptoms remains unknown but presentation is most consistent with vulvar dermatitis.  Wet prep was negative for vulvovaginal candidiasis, bacterial vaginosis, and trichomonas.  Patient declines STI testing and has no concerns as she is in a monogamous long-term relationship so doubt STI or PID.  GC swab sent for testing, patient declined prophylactic  treatment today.  No evidence of mucous membrane involvement, negative Nikolsky, and no systemic symptoms that would be consistent with SJS/TEN, bullous pemphigoid, or pemphigus vulgaris. No desquamation or systemic symptoms present to indicate TSS. No bullae, crepitus, or pain out of proportion to indicate necrotizing fasciitis. Based on evaluation today, doubt lichen planus, lichen sclerosis, desquamative inflammatory vaginitis, and postmenopausal vulvovaginal atrophy.  On call OB/GYN, Smita Mike, recommends patient start applying prescribed triamcinolone to the area until she is seen outpatient by OB/GYN.  If patient prefers to not use the Kenalog, she may use vegetable oil or coconut oil.    Plan to discharge patient home with symptomatic care and prompt primary care follow up.  I advised patient to follow OB/GYN's recommendation and provided resources on ways to afford her antihistamine medication (fexofenadine) to help with her full body urticaria.  Patient was provided with clear, written instructions of potential danger signs and where and when to return for emergency care or re-evaluation.       Medical Decision Making  Obtained supplemental history:Supplemental history obtained?: Family Member/Significant Other  Reviewed external records: External records reviewed?: Documented in chart  Care impacted by chronic illness:Chronic Pain and Other: chronic pruritic rash  Care significantly affected by social determinants of health:Other: English is second language  Did you consider but not order tests?: Work up considered but not performed and documented in chart, if applicable  Did you interpret images independently?: Independent interpretation of ECG and images noted in documentation, when applicable.  Consultation discussion with other provider:Did you involve another provider (consultant, , pharmacy, etc.)?: I discussed the care with another health care provider, see documentation for  details.  Discharge. I recommended the patient continue their current prescription strength medication(s): Kenalog. See documentation for any additional details.    FINAL IMPRESSION       ICD-10-CM    1. Vaginal pruritus  N89.8           ED COURSE   4:53 PM I met and introduced myself to the patient. I gathered initial history and performed my physical exam. We discussed plan for initial workup.   5:03 PM Performed pelvic exam with nurse chaperone present.   5:49 PM I have staffed the patient with Dr. Whitfield ED MD, who has evaluated the patient and agrees with all aspects of today's care.   5:56 PM I reassessed the patient and gathered additional reproductive history. Patient is menstruating still. LMP last week.  7:15 PM I reassessed the patient. Discussed results so far and plan to consult OBGYN for further recommendations. MD Nahid will take call.  7:55 PM MD Nahid discussed with OB/GYN who recommends the topical Kenalog cream and follow-up in clinic  8:15 PM I rechecked the patient and discussed results, discharge, follow up, and reasons to return to the ED.     MEDICATIONS GIVEN IN THE EMERGENCY DEPARTMENT:   Medications   fexofenadine (ALLEGRA) tablet 180 mg (180 mg Oral $Given 2/25/24 1817)      NEW PRESCRIPTIONS STARTED AT TODAY'S ED VISIT:  Discharge Medication List as of 2/25/2024  8:20 PM        =================================================================     HISTORY OF PRESENT ILLNESS   Patient information was obtained from: patient and patient's significant other   Use of Intrepreter: Yes (in person, patient's significant other) - Yana Smith is a 53 year old female with a pertinent history of chronic pruritic rash who presents to the ED by walk in for evaluation of vaginal itching.    On 2/15/24 (10 days ago), the patient developed an itchy rash in her pubic area. She says it is itchy on both the outside and inside of her vagina. She denies vaginal bleeding or discharge but  "notes there is \"water\" that comes out of the rash, which causes it to become itchier. Patient previously seen dermatology for evaluation of itchy rash but says she has not had the rash in her pubic area before. She was prescribed fexofenadine and creams to use at home. She is using cetirizine instead of fexofenadine, because fexofenadine was not covered by her insurance. Says she was previously using the creams prescribed but is not anymore, because she had a reaction to them. Has been using OTC miconazole for 3 days, which has not improved her symptoms. The patient endorses a history of yeast infection over 20 years ago. Her current symptoms do not feel similar. No fever, chills, nausea, vomiting, diarrhea, abdominal pain, back pain, or any other complaints at this time.  No pain with sexual intercourse or concerns for sexually transmitted infections.      Per chart review,  02/01/2024 - Patient seen by dermatology for eczematous dermatitis and chronic idiopathic urticaria.  Was started on fexofenadine 180 mg twice daily and given fluocinonide ointment and triamcinolone cream.      MEDICAL HISTORY     Past Medical History:   Diagnosis Date    Urinary frequency        Past Surgical History:   Procedure Laterality Date    MAMMOPLASTY AUGMENTATION Bilateral 2009    TOTAL HIP ARTHROPLASTY Right        Family History   Problem Relation Age of Onset    Breast Cancer No family hx of     Diabetes No family hx of     Hypertension No family hx of     Hyperlipidemia No family hx of     Cerebrovascular Disease No family hx of     Coronary Artery Disease No family hx of        Social History     Tobacco Use    Smoking status: Never    Smokeless tobacco: Never   Substance Use Topics    Alcohol use: Not Currently     Comment: rarely    Drug use: No       ARIPiprazole (ABILIFY) 5 MG tablet  balsalazide (COLAZAL) 750 MG capsule  Cholecalciferol (VITAMIN D3) 50 MCG (2000 UT) CAPS  fexofenadine (ALLEGRA) 180 MG tablet  fluocinonide " "(LIDEX) 0.05 % external ointment  hydrOXYzine (ATARAX) 25 MG tablet  naproxen (NAPROSYN) 500 MG tablet  PARoxetine (PAXIL) 10 MG tablet  spacer (OPTICHAMBER GHANSHYAM) holding chamber  traZODone (DESYREL) 50 MG tablet  triamcinolone (KENALOG) 0.1 % external cream        PHYSICAL EXAM   VITALS:  Patient Vitals for the past 24 hrs:   BP Temp Temp src Pulse Resp SpO2 Height Weight   02/25/24 2018 130/68 -- -- 62 16 100 % -- --   02/25/24 1830 128/64 -- -- 63 -- 99 % -- --   02/25/24 1730 (!) 140/69 -- -- 69 18 98 % -- --   02/25/24 1700 125/69 -- -- 75 -- 98 % -- --   02/25/24 1645 119/67 -- -- 70 -- 99 % -- --   02/25/24 1616 134/75 97.6  F (36.4  C) Temporal 77 18 96 % 1.549 m (5' 1\") 59 kg (130 lb)       Physical Exam  Vitals and nursing note reviewed. Exam conducted with a chaperone present (Kaylen Singh RN).   Constitutional:       General: She is not in acute distress.     Appearance: She is not ill-appearing or toxic-appearing.   HENT:      Mouth/Throat:      Lips: No lesions.      Mouth: Mucous membranes are moist. No oral lesions.      Pharynx: Oropharynx is clear.   Genitourinary:     Labia:         Right: No rash, tenderness or lesion.         Left: No rash, tenderness or lesion.       Urethra: No urethral swelling or urethral lesion.      Vagina: Normal. No signs of injury and foreign body. No vaginal discharge, erythema, bleeding or lesions.      Cervix: No discharge, friability, lesion or cervical bleeding.      Uterus: Not enlarged, not tender and no uterine prolapse.    Musculoskeletal:      Cervical back: Full passive range of motion without pain.   Skin:     General: Skin is warm.      Capillary Refill: Capillary refill takes less than 2 seconds.      Findings: No abrasion, acne, erythema, lesion, petechiae or rash.         LABS AND IMAGING   All pertinent labs reviewed and interpreted  Labs Ordered and Resulted from Time of ED Arrival to Time of ED Departure   ROUTINE UA WITH MICROSCOPIC REFLEX TO " CULTURE - Abnormal       Result Value    Color Urine Light Yellow      Appearance Urine Clear      Glucose Urine Negative      Bilirubin Urine Negative      Ketones Urine Negative      Specific Gravity Urine 1.024      Blood Urine Negative      pH Urine 8.0 (*)     Protein Albumin Urine Negative      Urobilinogen Urine <2.0      Nitrite Urine Negative      Leukocyte Esterase Urine Negative      Mucus Urine Present (*)     Amorphous Crystals Urine Few (*)     RBC Urine <1      WBC Urine 2      Squamous Epithelials Urine <1     WET PREPARATION - Abnormal    Trichomonas Absent      Yeast Absent      Clue Cells Absent      WBCs/high power field 1+ (*)    NEISSERIA GONORRHOEAE PCR   CHLAMYDIA TRACHOMATIS PCR       No orders to display     I, Anushka Musa, am serving as a scribe to document services personally performed by Sallie Savage PA-C, based on my observation and the provider's statements to me. I, Sallie Savage PA-C attest that Anushka Musa is acting in a scribe capacity, has observed my performance of the services and has documented them in accordance with my direction.     Sallie Savage PA-C   Emergency Medicine   [facility] EMERGENCY DEPARTMENT  [address] [place] [id number]  [phone]  Dept: [phone]      Sallie Savage PA-C  02/25/24 9099

## 2024-02-26 ENCOUNTER — PATIENT OUTREACH (OUTPATIENT)
Dept: CARE COORDINATION | Facility: CLINIC | Age: 54
End: 2024-02-26
Payer: COMMERCIAL

## 2024-02-26 LAB
C TRACH DNA SPEC QL NAA+PROBE: NEGATIVE
N GONORRHOEA DNA SPEC QL NAA+PROBE: NEGATIVE

## 2024-02-26 NOTE — PROGRESS NOTES
Clinic Care Coordination Contact  Follow Up Progress Note      Assessment:  The pt was recently in the ED, I called to check up on the pt and help the pt setup a ED follow up. The pt was at [facility] for vaginal itching. I called the pt, but got her vm, so I left a vm for the pt to give me a call back.     Care Gaps:    Health Maintenance Due   Topic Date Due    ADVANCE CARE PLANNING  Never done    DEPRESSION ACTION PLAN  Never done    ZOSTER IMMUNIZATION (1 of 2) Never done    HEPATITIS B IMMUNIZATION (3 of 3 - 19+ 3-dose series) 08/21/2023    YEARLY PREVENTIVE VISIT  08/30/2023    DTAP/TDAP/TD IMMUNIZATION (2 - Td or Tdap) 12/10/2023           Care Plans      Intervention/Education provided during outreach:               Plan:     Care Coordinator will follow up in

## 2024-02-26 NOTE — ED PROVIDER NOTES
"Emergency Department Midlevel Supervisory Note     I had a face to face encounter with this patient seen by the Advanced Practice Provider (JOHANA). I personally made/approved the management plan and take responsibility for the patient management. I personally saw patient and performed a substantive portion of the visit including all aspects of the medical decision making.     ED Course:  6:03 PM Sallie Savage PA-C staffed patient with me. I agree with their assessment and plan of management, and I will see the patient.   I met with the patient to introduce myself, gather additional history, perform my initial exam, and discuss the plan.     Brief HPI:     Cindy Smith is a 53 year old female who presents for evaluation of vaginal itching.    On 2/15/24 (10 days ago), the patient developed an itchy rash in her pubic area. She says it is itchy on both the outside and inside of her vagina. She denies vaginal bleeding or discharge but notes there is \"water\" that comes out of the rash, which causes it to become itchier. Patient previously seen dermatology for evaluation of itchy rash but says she has not had the rash in her pubic area before. She was prescribed fexofenadine and creams to use at home. She is using cetirizine instead of fexofenadine, because fexofenadine was not covered by her insurance. Says she was previously using the creams prescribed but is not anymore, because she had a reaction to them. Has been using OTC miconazole for 3 days, which has not improved her symptoms. The patient endorses a history of yeast infection over 20 years ago. Her current symptoms do not feel similar. No fever, chills, nausea, vomiting, diarrhea, abdominal pain, back pain, or any other complaints at this time.        I, Daisha Medina, am serving as a scribe to document services personally performed by Nithin Whitfield DO, based on my observations and the provider's statements to me.   I, Nithin Whitfield DO attest that Daisha WEEKS" "Carol was acting in a scribe capacity, has observed my performance of the services and has documented them in accordance with my direction.    Brief Physical Exam: /64   Pulse 63   Temp 97.6  F (36.4  C) (Temporal)   Resp 18   Ht 1.549 m (5' 1\")   Wt 59 kg (130 lb)   LMP 02/18/2024   SpO2 99%   BMI 24.56 kg/m    Constitutional:  Alert, in no acute distress  GYN: General genitalia without lichenification or definitive skin lesions.  Internal labia minora with mild erythema         MDM:  Exam without specific findings.  Discussed case with  for GYN recommended avoidance of soaps frequent cleaning and application of topical chewable or mineral oil for symptomatic care and they will see the patient in follow-up for further evaluation       1. Vaginal pruritus        Consults:  GYN as per [facility]    Labs and Imaging:  Results for orders placed or performed during the hospital encounter of 02/25/24   UA with Microscopic reflex to Culture    Specimen: Urine, Clean Catch   Result Value Ref Range    Color Urine Light Yellow Colorless, Straw, Light Yellow, Yellow    Appearance Urine Clear Clear    Glucose Urine Negative Negative mg/dL    Bilirubin Urine Negative Negative    Ketones Urine Negative Negative mg/dL    Specific Gravity Urine 1.024 1.001 - 1.030    Blood Urine Negative Negative    pH Urine 8.0 (H) 5.0 - 7.0    Protein Albumin Urine Negative Negative mg/dL    Urobilinogen Urine <2.0 <2.0 mg/dL    Nitrite Urine Negative Negative    Leukocyte Esterase Urine Negative Negative    Mucus Urine Present (A) None Seen /LPF    Amorphous Crystals Urine Few (A) None Seen /HPF    RBC Urine <1 <=2 /HPF    WBC Urine 2 <=5 /HPF    Squamous Epithelials Urine <1 <=1 /HPF   Wet prep    Specimen: Vagina; Swab   Result Value Ref Range    Trichomonas Absent Absent    Yeast Absent Absent    Clue Cells Absent Absent    WBCs/high power field 1+ (A) None       I have reviewed the relevant laboratory studies " above.          Procedures:  I was present for the key portions of procedures documented in JOHANA/midlevel note, see midlevel note for further details.    Nithin Whitfield DO  [facility] EMERGENCY DEPARTMENT  [address] [place] [id number] [phone]     Nithin Whitfield DO  03/18/24 0203

## 2024-02-26 NOTE — ED NOTES
Pt discharged ambulatory to home at 2130. All questions answered. Pt expressed understanding of info

## 2024-02-26 NOTE — DISCHARGE INSTRUCTIONS
Please follow-up with OB/GYN and dermatology.    Apply the triamcinolone (kenalog) cream given to you by dermatology to your groin two times daily. If you develop irritation from this, apply coconut oil or vegetable oil to keep the mucose hydrated.    At your last dermatology appointment they recommended fexofenadine (Allegra) 180 mg twice daily. This can be found over the counter. You received your a dose of this today in the ED.      Call your doctor now or seek immediate medical care if:    You have a fever.     You have new or increased pain in your vagina or pelvis.     You have new or worse vaginal itching or discharge.   Watch closely for changes in your health, and be sure to contact your doctor if:    You have bleeding other than your period.     You do not get better as expected.

## 2024-02-27 ENCOUNTER — PATIENT OUTREACH (OUTPATIENT)
Dept: CARE COORDINATION | Facility: CLINIC | Age: 54
End: 2024-02-27
Payer: COMMERCIAL

## 2024-02-27 NOTE — PROGRESS NOTES
Clinic Care Coordination Contact  Follow Up Progress Note      Assessment: The pt was recently in the ED, I called to check up on the pt and help the pt setup a ED follow up. The pt was at [facility] for vaginal itching. I called and talked to the pt, pt stated that she is doing better. Pt stated that she wanted to wait, and see. If she needs a follow up, she will call the clinic.    Care Gaps:    Health Maintenance Due   Topic Date Due    ADVANCE CARE PLANNING  Never done    DEPRESSION ACTION PLAN  Never done    ZOSTER IMMUNIZATION (1 of 2) Never done    HEPATITIS B IMMUNIZATION (3 of 3 - 19+ 3-dose series) 08/21/2023    YEARLY PREVENTIVE VISIT  08/30/2023    DTAP/TDAP/TD IMMUNIZATION (2 - Td or Tdap) 12/10/2023           Care Plans      Intervention/Education provided during outreach:               Plan:     Care Coordinator will follow up in

## 2024-02-29 ENCOUNTER — APPOINTMENT (OUTPATIENT)
Dept: INTERPRETER SERVICES | Facility: CLINIC | Age: 54
End: 2024-02-29
Payer: COMMERCIAL

## 2024-03-06 ENCOUNTER — TELEPHONE (OUTPATIENT)
Dept: DERMATOLOGY | Facility: CLINIC | Age: 54
End: 2024-03-06
Payer: COMMERCIAL

## 2024-03-06 NOTE — TELEPHONE ENCOUNTER
Via phone patient was scheduled for the following:    Appointment type: Return  Provider: Dr. Sandhu  Return date: 4-25-24  Specialty phone number: [phone]

## 2024-03-22 ENCOUNTER — OFFICE VISIT (OUTPATIENT)
Dept: ALLERGY | Facility: CLINIC | Age: 54
End: 2024-03-22
Payer: COMMERCIAL

## 2024-03-22 VITALS — OXYGEN SATURATION: 97 % | BODY MASS INDEX: 25.13 KG/M2 | HEART RATE: 68 BPM | WEIGHT: 133 LBS

## 2024-03-22 DIAGNOSIS — L30.9 DERMATITIS: Primary | ICD-10-CM

## 2024-03-22 PROCEDURE — T1013 SIGN LANG/ORAL INTERPRETER: HCPCS | Mod: U4

## 2024-03-22 PROCEDURE — 99213 OFFICE O/P EST LOW 20 MIN: CPT | Performed by: ALLERGY & IMMUNOLOGY

## 2024-03-22 RX ORDER — BETAMETHASONE DIPROPIONATE 0.5 MG/G
CREAM TOPICAL 2 TIMES DAILY
Qty: 45 G | Refills: 1 | Status: SHIPPED | OUTPATIENT
Start: 2024-03-22 | End: 2024-08-19

## 2024-03-22 NOTE — PATIENT INSTRUCTIONS
Dermatitis    Bethamethasone twice daily followed by Vaseline/Cereve/Eucerin    Follow in dermatology     Stop the antihistamines (allegra, Zyrtec) if not helping

## 2024-03-22 NOTE — PROGRESS NOTES
Vi White is a 53 year old, presenting for the following health issues:  RECHECK (Following up on hives,meds not working used her brothers Betamethasone Dipropinate ointment and that has worked well for her)    HPI     Chief complaint: Follow-up hives    History of present illness: This is a pleasant 53-year-old woman here today for follow-up of itching.  I last saw her in November.  History sounded consistent with hives but she did have some atypical features and that the lesions lasted more than a day.  Recommendation had been to take a picture of the rash and return.  She has been to the emergency room since I saw her for this.  I did scan and pictures that show more consistent lesions with dermatitis rather than hives.  She continues to state that the lesions last a few days before resolving.  She states even up to a week.  She describes the lesion is dry and rough.  She states the oral antihistamines do not help but she has used some steroid creams that have helped.  She was seen by dermatology and prescribed triamcinolone cream and fluocinonide ointment but she states she did not get the cream and she was prescribed only the ointment and she felt like it made things worse that it was as it was too wet.  Her brother has betamethasone cream and she reports this seemed to help.  She would like a prescription for this.  She has a dermatology visit at the end of April.  She has a picture today that shows a lesion that is rough and scaly.          Objective    Pulse 68   Wt 60.3 kg (133 lb)   LMP 02/18/2024   SpO2 97%   BMI 25.13 kg/m    Body mass index is 25.13 kg/m .  Physical Exam   Gen: Pleasant female not in acute distress  HEENT: Eyes no erythema of the bulbar or palpebral conjunctiva, no edema.   Skin: No rashes or lesions  Psych: Alert and oriented times 3    Impression report and plan:  1.  Dermatitis    Lesions look more consistent with dermatitis than chronic hives.  Some patients can  develop secondary hives from dermatitis.  However, she sees relief with betamethasone cream so I did prescribe this for her today.  She will follow with dermatology at the end of next month.  I stated she can stop antihistamines that she reports they do not seem to be helping.  I did review sensitive skin care tips.  Follow in the allergy clinic as needed.    Signed Electronically by: Jessica OLIVEIRA MD

## 2024-03-22 NOTE — LETTER
3/22/2024         RE: Cindy Smith  [address] [place] [address]        Dear Colleague,    Thank you for referring your patient, Cindy Smith, to the [facility]. Please see a copy of my visit note below.    No notes on file    Again, thank you for allowing me to participate in the care of your patient.        Sincerely,        Jessica OLIVEIRA MD

## 2024-07-05 ENCOUNTER — ANCILLARY PROCEDURE (OUTPATIENT)
Dept: MAMMOGRAPHY | Facility: CLINIC | Age: 54
End: 2024-07-05
Attending: FAMILY MEDICINE
Payer: COMMERCIAL

## 2024-07-05 DIAGNOSIS — Z12.31 SCREENING MAMMOGRAM, ENCOUNTER FOR: ICD-10-CM

## 2024-07-05 PROCEDURE — 77067 SCR MAMMO BI INCL CAD: CPT

## 2024-07-19 ENCOUNTER — OFFICE VISIT (OUTPATIENT)
Dept: FAMILY MEDICINE | Facility: CLINIC | Age: 54
End: 2024-07-19
Payer: COMMERCIAL

## 2024-07-19 VITALS
SYSTOLIC BLOOD PRESSURE: 123 MMHG | TEMPERATURE: 98.8 F | OXYGEN SATURATION: 96 % | HEART RATE: 75 BPM | HEIGHT: 61 IN | DIASTOLIC BLOOD PRESSURE: 76 MMHG | WEIGHT: 123 LBS | BODY MASS INDEX: 23.22 KG/M2 | RESPIRATION RATE: 20 BRPM

## 2024-07-19 DIAGNOSIS — M62.830 MUSCLE SPASM OF BACK: Primary | ICD-10-CM

## 2024-07-19 DIAGNOSIS — E55.9 VITAMIN D DEFICIENCY: ICD-10-CM

## 2024-07-19 DIAGNOSIS — K51.311 ULCERATIVE RECTOSIGMOIDITIS WITH RECTAL BLEEDING (H): ICD-10-CM

## 2024-07-19 PROCEDURE — 99214 OFFICE O/P EST MOD 30 MIN: CPT | Mod: GC

## 2024-07-19 RX ORDER — ACETAMINOPHEN 160 MG
2000 TABLET,DISINTEGRATING ORAL DAILY
Qty: 90 CAPSULE | Refills: 3 | Status: CANCELLED | OUTPATIENT
Start: 2024-07-19

## 2024-07-19 RX ORDER — BALSALAZIDE DISODIUM 750 MG/1
2250 CAPSULE ORAL 3 TIMES DAILY
Qty: 270 CAPSULE | Refills: 5 | Status: CANCELLED | OUTPATIENT
Start: 2024-07-19

## 2024-07-19 RX ORDER — ACETAMINOPHEN 160 MG
2000 TABLET,DISINTEGRATING ORAL DAILY
Qty: 90 CAPSULE | Refills: 3 | Status: SHIPPED | OUTPATIENT
Start: 2024-07-19

## 2024-07-19 RX ORDER — BALSALAZIDE DISODIUM 750 MG/1
2250 CAPSULE ORAL 3 TIMES DAILY
Qty: 270 CAPSULE | Refills: 2 | Status: SHIPPED | OUTPATIENT
Start: 2024-07-19

## 2024-07-19 ASSESSMENT — PATIENT HEALTH QUESTIONNAIRE - PHQ9
SUM OF ALL RESPONSES TO PHQ QUESTIONS 1-9: 13
10. IF YOU CHECKED OFF ANY PROBLEMS, HOW DIFFICULT HAVE THESE PROBLEMS MADE IT FOR YOU TO DO YOUR WORK, TAKE CARE OF THINGS AT HOME, OR GET ALONG WITH OTHER PEOPLE: VERY DIFFICULT
SUM OF ALL RESPONSES TO PHQ QUESTIONS 1-9: 13

## 2024-07-19 NOTE — PATIENT INSTRUCTIONS
Muscle Spasm  - I have included some exercises that can help with low back pain  - Laying in a prone position on your back on a mat or blanket can help   - You can take Tylenol as needed   - You can take ibuprofen for a few days to see if this helps   - Use hot packs and ice packs every once and a while for the pain     Ulcerative Colitis   - I have refilled the medications   - Please follow up with GI within the next few months

## 2024-07-19 NOTE — PROGRESS NOTES
Assessment & Plan     Muscle spasm of back  New intermittent spasm of the right mid-low lumbar region. Comes and goes within a few minutes and resolves spontaneously. No significant findings on exam.   - Apply warm/cold compresses   - Exercises given for low back pain  - Advised to try lying on a flat surface every day    - Tylenol as needed   - May try ibuprofen for a few days   - No imaging at this time   - Follow up for worsening or persistent pain     Vitamin D deficiency  Refilled prescription today    Ulcerative rectosigmoiditis with rectal bleeding (H)  Patient has a history of ulcerative colitis treated with balsalazide, needing a refill today. Denies any abdominal pain, N/V/D/C, rectal bleeding. Advised the patient to follow up with GI as soon as possible.   - Refill sent for balsalazide     Return if symptoms worsen or fail to improve.    Vi White is a 54 year old, presenting for the following health issues:  Back Pain (On right side of the upper him on the spinal. Starting about two months from now)        7/19/2024     3:58 PM   Additional Questions   Roomed by Jacinto         7/19/2024    Information    services provided? Yes   Language ong   Type of interpretation provided Face-to-face    name Cornelia Smith    Agency Ashlie White is a 54 y.o. female with history of ulcerative colitis presenting for medication refill and right sided low back pain. The pain started many years ago about 2 months ago became worse. She describes this as a sharp pain located in the right mid lumbar region. The pain is sudden and comes on unexpectedly without radiation. The pain is primarily located in one single point on the right lumbar region. She denies any numbness or tingling in association with the back pain but she does periodically have some numbness and tingling associated with muscle cramps that started after she had a total hip on the right side. She has  "to stay still when the back pain occurs until it releases and she is able to move. Denies new rash or trauma in the area. She has tried Tylenol and topical pain ointment without much improvement in her back pain.     Cindy needs her Colozal and vitamin D refilled. She is tolerating her medications well. She denies any new or worsening GI symptoms, denies N/V/D/C, abdominal pain, CP, SOB, dysuria, numbness, or tingling. We discussed the importance of following up with her GI doctor. She notes that she has recently received notification to follow up with them and plans to do so.          Objective    /76   Pulse 75   Temp 98.8  F (37.1  C) (Oral)   Resp 20   Ht 1.54 m (5' 0.63\")   Wt 55.8 kg (123 lb)   SpO2 96%   BMI 23.53 kg/m    Body mass index is 23.53 kg/m .  Physical Exam  Constitutional:       Appearance: Normal appearance. She is normal weight.   HENT:      Head: Normocephalic and atraumatic.   Cardiovascular:      Rate and Rhythm: Normal rate and regular rhythm.      Pulses: Normal pulses.      Heart sounds: Normal heart sounds.   Pulmonary:      Effort: Pulmonary effort is normal. No respiratory distress.      Breath sounds: Normal breath sounds.   Abdominal:      General: Abdomen is flat. Bowel sounds are normal.      Palpations: Abdomen is soft.   Musculoskeletal:         General: No swelling, tenderness, deformity or signs of injury.      Cervical back: Normal range of motion and neck supple.      Comments: Skin of back is normal, atraumatic. No tenderness to palpation along the paraspinal muscles and along the ribcage. Good active ROM.    Skin:     General: Skin is warm and dry.   Neurological:      Mental Status: She is alert.   Psychiatric:         Mood and Affect: Mood normal.         Behavior: Behavior normal.           Signed Electronically by: Smita Huntley DO    "

## 2024-07-19 NOTE — PROGRESS NOTES
Preceptor Attestation:   Patient seen, evaluated and discussed with the resident. I have verified the content of the note, which accurately reflects my assessment of the patient and the plan of care.    In terms of back pain, there are no red flag signs (no perineal numbness, no incontinence, no fevers, no new weakness).     Supervising Physician:Bertha Griffith MD  [facility]

## 2024-07-26 NOTE — DISCHARGE INSTRUCTIONS
"Discharge Instructions for COVID-19 Patients  You have--or may have--COVID-19. Please follow the instructions listed below.   If you have a weakened immune system, discuss with your doctor any other actions you need to take.  How can I protect others?  If you have symptoms (fever, cough, body aches or trouble breathing):  Stay home and away from others (self-isolate) until:  Your other symptoms have resolved (gotten better). And   You've had no fever--and no medicine that reduces fever--for 1 full day (24 hours). And   At least 10 days have passed since your symptoms started. (You may need to wait 20 days. Follow the advice of your care team.)  If you don't show symptoms, but testing showed that you have COVID-19:  Stay home and away from others (self-isolate) until at least 10 days have passed since the date of your first positive COVID-19 test.  During this time  Stay in your own room, even for meals. Use your own bathroom if you can.  Stay away from others in your home. No hugging, kissing or shaking hands. No visitors.  Don't go to work, school or anywhere else.  Clean \"high touch\" surfaces often (doorknobs, counters, handles). Use household cleaning spray or wipes.  You'll find a full list of  on the EPA website: www.epa.gov/pesticide-registration/list-n-disinfectants-use-against-sars-cov-2.  Cover your mouth and nose with a mask or other face covering to avoid spreading germs.  Wash your hands and face often. Use soap and water.  Caregivers in these groups are at risk for severe illness due to COVID-19:  People 65 years and older  People who live in a nursing home or long-term care facility  People with chronic disease (lung, heart, cancer, diabetes, kidney, liver, immunologic)  People who have a weakened immune system, including those who:  Are in cancer treatment  Take medicine that weakens the immune system, such as corticosteroids  Had a bone marrow or organ transplant  Have an immune " The [place] Prescription Drug Monitoring Program was reviewed today and patient is compliant with controlled medication refills. There are no irregularities in refills noted.     deficiency  Have poorly controlled HIV or AIDS  Are obese (body mass index of 40 or higher)  Smoke regularly  Caregivers should wear gloves while washing dishes, handling laundry and cleaning bedrooms and bathrooms.  Use caution when washing and drying laundry: Don't shake dirty laundry and use the warmest water setting that you can.  For more tips on managing your health at home, go to www.cdc.gov/coronavirus/2019-ncov/downloads/10Things.pdf.  How can I take care of myself at home?  Get lots of rest. Drink extra fluids (unless a doctor has told you not to).  Take Tylenol (acetaminophen) for fever or pain. If you have liver or kidney problems, ask your family doctor if it's okay to take Tylenol.   Adults can take either:   650 mg (two 325 mg pills) every 4 to 6 hours, or   1,000 mg (two 500 mg pills) every 8 hours as needed.  Note: Don't take more than 3,000 mg in one day. Acetaminophen is found in many medicines (both prescribed and over-the-counter medicines). Read all labels to be sure you don't take too much.   For children, check the Tylenol bottle for the right dose. The dose is based on the child's age or weight.  If you have other health problems (like cancer, heart failure, an organ transplant or severe kidney disease): Call your specialty clinic if you don't feel better in the next 2 days.  Know when to call 911. Emergency warning signs include:  Trouble breathing or shortness of breath  Pain or pressure in the chest that doesn't go away  Feeling confused like you haven't felt before, or not being able to wake up  Bluish-colored lips or face  Your doctor may have prescribed a blood thinner medicine. Follow their instructions.  Where can I get more information?   [facility] - About COVID-19:   [email or web address]  CDC - What to Do If You're Sick: www.cdc.gov/coronavirus/2019-ncov/about/steps-when-sick.html  CDC - Ending Home Isolation:  www.cdc.gov/coronavirus/2019-ncov/hcp/disposition-in-home-patients.html  CDC - Caring for Someone: www.cdc.gov/coronavirus/2019-ncov/if-you-are-sick/care-for-someone.html  [facility] - Interim Guidance for Hospital Discharge to Home: www.health.[facility].[place]/diseases/coronavirus/hcp/hospdischarge.pdf  Below are the COVID-19 hotlines at the [place] Department of Health ([facility]). Interpreters are available.  For health questions: Call 297-345-0054 or 1-822.482.6970 (7 a.m. to 7 p.m.)  For questions about schools and childcare: Call 128-118-8403 or 1-221.605.8251 (7 a.m. to 7 p.m.)    For informational purposes only. Not to replace the advice of your health care provider. Clinically reviewed by Dr. Charles Head.   Copyright   2020 [facility]. All rights reserved. [employer] [id number] - REV 01/05/21.

## 2024-08-19 ENCOUNTER — OFFICE VISIT (OUTPATIENT)
Dept: FAMILY MEDICINE | Facility: CLINIC | Age: 54
End: 2024-08-19
Payer: COMMERCIAL

## 2024-08-19 VITALS
BODY MASS INDEX: 23.41 KG/M2 | WEIGHT: 124 LBS | OXYGEN SATURATION: 97 % | HEIGHT: 61 IN | TEMPERATURE: 98.3 F | RESPIRATION RATE: 20 BRPM | DIASTOLIC BLOOD PRESSURE: 73 MMHG | HEART RATE: 67 BPM | SYSTOLIC BLOOD PRESSURE: 131 MMHG

## 2024-08-19 DIAGNOSIS — L30.9 DERMATITIS: ICD-10-CM

## 2024-08-19 DIAGNOSIS — M54.41 ACUTE BILATERAL LOW BACK PAIN WITH RIGHT-SIDED SCIATICA: Primary | ICD-10-CM

## 2024-08-19 PROCEDURE — 99214 OFFICE O/P EST MOD 30 MIN: CPT | Performed by: FAMILY MEDICINE

## 2024-08-19 RX ORDER — ACETAMINOPHEN 500 MG
1000 TABLET ORAL 3 TIMES DAILY PRN
Qty: 100 TABLET | Refills: 1 | Status: SHIPPED | OUTPATIENT
Start: 2024-08-19

## 2024-08-19 RX ORDER — BETAMETHASONE DIPROPIONATE 0.5 MG/G
CREAM TOPICAL 2 TIMES DAILY
Qty: 45 G | Refills: 0 | Status: SHIPPED | OUTPATIENT
Start: 2024-08-19

## 2024-08-19 NOTE — PATIENT INSTRUCTIONS
- Set up an appointment for physical therapy as soon as you can at [facility]    - Keep taking acetaminophen 1000 mg three times per day    - Start taking the topical diclofenac medication four times per day on the part of your back that hurts the most    - I refilled your topical steroid. Use this twice daily for no more than 2 weeks. Please follow up with your dermatologist for further advice on next steps for what is causing your rash and treatment.     - Follow up with Dr. Cooper in 6-8 weeks

## 2024-08-19 NOTE — PROGRESS NOTES
Assessment & Plan     Acute bilateral low back pain with right-sided sciatica  2 month history of daily low back pain with radiation now down the right leg. Cannot take NSAIDs due to ulcerative rectosigmoiditis history. No red flags but symptoms have been more frequent and are limiting daily activities.   - Physical Therapy  Referral; Future  - Add on topical diclofenac (VOLTAREN) 1 % topical gel; Apply 2 g topically 4 times daily  - acetaminophen (TYLENOL) 500 MG tablet; Take 2 tablets (1,000 mg) by mouth 3 times daily as needed for pain  - Discussed potential benefit of stretching, massage, cupping, and acupuncture  - Close follow up with PCP in 6 weeks    Dermatitis  Following previously with allergy & dermatology for rash that appeared most consistent with dermatitis on allergy assessment in March 2024. Was given high potency steroids with some improvement but now recurrent rash. Has not followed with dermatology consistently. Rash today does appear more consistent with dermatitis, but is quite excoriated which makes diagnosis challenging.   - betamethasone dipropionate (DIPROSONE) 0.05 % external cream; Apply topically 2 times daily For max of 14 days in a row  - Encouraged follow up with dermatology    Follow up in 6 weeks.    Vi White is a 54 year old, presenting for the following health issues:  Follow Up (Back pain)        8/19/2024     9:56 AM   Additional Questions   Roomed by Jacinto         8/19/2024    Information    services provided? Yes   Language Hmong   Type of interpretation provided Telephone    name Sirisha Duran    ID [id number]    [facility]ency [facility]  Services        HPI     She is here today to follow up on back pain. She has had daily back pain since the past 2 months after a few years of intermittent back pain in the same area. It hurts the most on the lower right but is in both sides of the low back. The pain  "intensity is the same but has spread to involve a larger area than before. Now more into the left side. She continues to have lower back pain when she is laying down, which makes it hard to get up from the lying position. This disrupts her sleep because she had a hard time finding a comfortable position. She feels a lump on her right lower back. The pain radiates into the right buttock and outside of the right hip. She feels some numbness when she is walking into the right leg from the knee down to the foot on the outside of her leg. She has a history of a right hip replacement due to DJD about 7-8 years ago and reports that this symptom of numbness started then. No new weakness. No saddle anesthesia or bowel/bladder changes.  No history of previous back injury or new activities.     She has tried ice without benefit. When she stays still for 20-30 minutes after changing position and then the pain will improve. She takes Tylenol when her pain is more severe, about 1000 mg 3-4 times per day on the bad days. Tylenol only gives her about 4-5 hours of relief. She has not taken NSAIDs because she was told not to do this given her history of GI bleeding. Has done the exercises a few times but it is hard to do them regularly due to her pain. She hasn't tried any alternative therapies for her care including massage, cupping, or acupuncture.     Also would like a refill of a topical medication for the rash on her neck. She was evaluated by dermatology and allergy for this rash earlier this year. The cream that she got in March was the most helpful. It is flaring up again.         Objective    /73   Pulse 67   Temp 98.3  F (36.8  C) (Tympanic)   Resp 20   Ht 1.56 m (5' 1.42\")   Wt 56.2 kg (124 lb)   SpO2 97%   BMI 23.11 kg/m    Body mass index is 23.11 kg/m .  Physical Exam   GENERAL: alert and no distress  NECK: no adenopathy, no asymmetry, masses, or scars  SKIN: erythematous papular rash with overlying " excoriation along posterior neck just below hair line, 7-8 cm in width  BACK: tenderness to palpation over lumbar paraspinal musculature on the right extending down into gluteals and right lateral hip, reduced lateral bending to the right, twisting to the right, and flexion. Positive straight leg raise on the right.  NEURO: strength 5/5 in lower extremities, coordination grossly normal      Signed Electronically by: Sandra Smith MD

## 2024-08-20 ENCOUNTER — HOSPITAL ENCOUNTER (EMERGENCY)
Facility: HOSPITAL | Age: 54
Discharge: HOME | End: 2024-08-20
Payer: COMMERCIAL

## 2024-08-20 VITALS
HEART RATE: 74 BPM | SYSTOLIC BLOOD PRESSURE: 134 MMHG | TEMPERATURE: 98 F | OXYGEN SATURATION: 99 % | HEIGHT: 61 IN | RESPIRATION RATE: 18 BRPM | DIASTOLIC BLOOD PRESSURE: 76 MMHG | BODY MASS INDEX: 23.56 KG/M2 | WEIGHT: 124.8 LBS

## 2024-08-20 DIAGNOSIS — R21 RASH: ICD-10-CM

## 2024-08-20 PROCEDURE — 99283 EMERGENCY DEPT VISIT LOW MDM: CPT

## 2024-08-20 RX ORDER — HYDROCORTISONE 2.5 %
CREAM (GRAM) TOPICAL 2 TIMES DAILY
Qty: 30 G | Refills: 0 | Status: SHIPPED | OUTPATIENT
Start: 2024-08-20

## 2024-08-20 RX ORDER — CETIRIZINE HYDROCHLORIDE 10 MG/1
10 TABLET ORAL DAILY
Qty: 30 TABLET | Refills: 0 | Status: SHIPPED | OUTPATIENT
Start: 2024-08-20

## 2024-08-20 ASSESSMENT — COLUMBIA-SUICIDE SEVERITY RATING SCALE - C-SSRS
2. HAVE YOU ACTUALLY HAD ANY THOUGHTS OF KILLING YOURSELF IN THE PAST MONTH?: NO
6. HAVE YOU EVER DONE ANYTHING, STARTED TO DO ANYTHING, OR PREPARED TO DO ANYTHING TO END YOUR LIFE?: NO
1. IN THE PAST MONTH, HAVE YOU WISHED YOU WERE DEAD OR WISHED YOU COULD GO TO SLEEP AND NOT WAKE UP?: NO

## 2024-08-20 ASSESSMENT — ACTIVITIES OF DAILY LIVING (ADL)
ADLS_ACUITY_SCORE: 35
ADLS_ACUITY_SCORE: 33
ADLS_ACUITY_SCORE: 33

## 2024-08-21 ENCOUNTER — PATIENT OUTREACH (OUTPATIENT)
Dept: CARE COORDINATION | Facility: CLINIC | Age: 54
End: 2024-08-21
Payer: COMMERCIAL

## 2024-08-21 NOTE — DISCHARGE INSTRUCTIONS
You were seen in the Emergency Department today for a skin rash. We do not have tests here to determine what is causing this.    A prescription was provided for Zyrtec, which is a medication similar to benadryl and works to help stop the allergic process. You can take continue to take the Zyrtec daily to help prevent your symptoms from coming back. This will not make you as sleepy as benadryl.  I have sent you with a prescription for this but you can also pick it up over the counter.  You can use the topical steroid cream twice a day for 7 days in a row, then stop and do not use the medication for the next 7 days.  Then, you can use the medication again for 7 days in a row, then take a break for the next 7 days, and repeat as needed.    If you begin to experience shortness of breath, difficulty breathing, difficulty swallowing, worsening swelling, or hives, you should return to the emergency department immediately. Otherwise, please follow up with your primary physician within the next 2-3 days for recheck and ongoing management.  Please call and schedule a follow-up appointment with your dermatologist.    Below is some information that you might find informative and useful.

## 2024-08-21 NOTE — ED TRIAGE NOTES
Patient presents to ER for concerns of a rash that started yesterday on her neck and elbow area. Denies any new lotions, detergents, foods.  Painful and itchy.      Seen primary MD yesterday and was prescribed a cream and has not picked it up

## 2024-08-21 NOTE — PROGRESS NOTES
Clinic Care Coordination Contact  Follow Up Progress Note      Assessment:  The pt was recently in the ED, I called to check up on the pt and help the pt setup a ED follow up. The pt was at [place]  for a rash. I called the pt,but got her vm, so I left a vm for the pt to give me a call back.     Care Gaps:    Health Maintenance Due   Topic Date Due    ADVANCE CARE PLANNING  Never done    DEPRESSION ACTION PLAN  Never done    ZOSTER IMMUNIZATION (1 of 2) Never done    HEPATITIS B IMMUNIZATION (3 of 3 - 19+ 3-dose series) 08/21/2023    YEARLY PREVENTIVE VISIT  08/30/2023    DTAP/TDAP/TD IMMUNIZATION (2 - Td or Tdap) 12/10/2023    COVID-19 Vaccine (6 - 2023-24 season) 12/25/2023

## 2024-08-21 NOTE — ED PROVIDER NOTES
EMERGENCY DEPARTMENT ENCOUNTER      NAME: Cindy Smith  AGE: 54 year old female  YOB: 1970  MRN: [id number]  EVALUATION DATE & TIME: 8/20/2024  7:37 PM    PCP: Milli Cooper    ED PROVIDER: Maria Fernanda Cohen PA-C      CHIEF COMPLAINT:  Rash      FINAL IMPRESSION:  1. Rash          ED COURSE & MEDICAL DECISION MAKING:  Pertinent Labs & Imaging studies reviewed. (See chart for details)    The patient is a 54 year old-year-old female with a history of rash presenting to the emergency department for evaluation of pruritic rash to left posterior neck just distal to hair line and bilateral elbows.  She was seen in clinic by primary care yesterday for rash most consistent with dermatitis and was prescribed topical betamethasone dipropionate 0.05%.  She has not been able to pick the medication up from the pharmacy as of yet.  Previously followed with allergy and dermatology for rash, appeared to be consistent with dermatitis on allergy assessment in March 2024. No household members with similar rash. No recent new medications. No fever or arthralgias. Denies history of travel or exposure to ticks or insect bites. Patient reports history of atopy.     Vitals reviewed and significant for elevated blood pressure, repeat blood pressure within normal limits.  Patient is afebrile. On exam patient is alert and non toxic appearing.  No oral lesions. Erythematous blanchable plaque with excoriation and overlying scale just distal to hairline along the left side of posterior neck as well as few blanchable erythematous papules to bilateral elbows. Palms and soles spared.  Exam not suggestive of cellulitis.      Patient presentation and physical exam most consistent with rash. No evidence of mucous membrane involvement, no bullae, and no systemic symptoms that would be consistent with SJS/TEN, bullous pemphigoid, or pemphigus vulgaris. No desquamation or systemic symptoms present to indicate TSS. No bullae, crepitus,  or pain out of proportion to indicate necrotizing fasciitis. Rash does not appear urticarial with no signs of anaphylaxis. Patient denies tick exposure and travel, minimal concern for Lyme disease and RMSF. Does not follow dermatomal distribution, minimal concern for herpes zoster. Blanches with pressure, minimal concern for petechial rashes from thrombocytopenia or rickettsial infections. Patient denies high risk sexual behavior, minimal concern for secondary syphilis or disseminated gonorrhea.    Discussed options for workup and management with patient and her , they are agreeable with plan for prescription for cetirizine and topical steroid, prescriptions printed so that they are able to fill prescriptions at their pharmacy of choice tonight.  Offered to give first dose of medications in emergency department, they declined and prefer to  the prescriptions instead.     Discussed plan for discharge home with prescriptions as per above and close outpatient follow-up with primary care clinician as well as dermatology. The patient verbalized understanding and is comfortable with this plan. Symptomatic home cares discussed. Emphasized importance of follow up with primary care clinician. Strict return precautions discussed.     At the conclusion of the encounter I discussed the results of all of the tests and the disposition. The questions were answered. The patient or family acknowledged understanding and was agreeable with the care plan.       ED COURSE:  9:28 PM  I met and introduced myself to the patient. I gathered initial history and performed an initial physical exam. We discussed options and plan for diagnostics and treatment here in the ED.   11:00 PM I discussed the plan for discharge with the patient, and patient is agreeable. We discussed supportive cares at home and reasons for return to the ER including new or worsening symptoms - all questions and concerns addressed to the best of my  ability. Strict return precautions discussed. Patient to be discharged by RN.        MEDICATIONS GIVEN IN THE EMERGENCY:  Medications - No data to display    NEW PRESCRIPTIONS STARTED AT TODAY'S ER VISIT  Discharge Medication List as of 8/20/2024 10:52 PM        START taking these medications    Details   cetirizine (ZYRTEC) 10 MG tablet Take 1 tablet (10 mg) by mouth daily, Disp-30 tablet, R-0, Local Print      hydrocortisone 2.5 % cream Apply topically 2 times daily For 7 days in a row, then stop and take a break from the medication and do not use it for the next 7 days.Disp-30 g, R-0Local Print                =================================================================    HPI    Patient information was obtained from: Patient    Use of Intrepreter: Yes (Phone) - Language Yana Smith is a 54 year old female with pertinent medical history of chronic pruritic rash who presents to the emergency department for evaluation of rash.     The patient reports rash on neck and bilateral arms present for a few months but worsening 1 day ago. She is having difficulty sleeping due to itching and burning sensation. She showers during the day which seems to relieve the pain.     She was prescribed a cream 1 day ago but has not filled the prescription. Patient has also tried hydrocortisone with no relief.     She reports a history of eczema.     Denies new detergents, soaps, clothes, pets, or recent moves. Denies fever, chills, body aches, joint aches. No vaginal discharge. No urinary symptoms. No concern for STD's. No recent travels.     Per chart review, the patient was seen at [facility] on 8/19/2024 for evaluation of dermatitis. The patient was following up on rash that was more consistent with dermatitis on allergy assessment in March 2024. She was given a high potency steroids with some improvement but now recurrent rash. Patient has not followed up with dermatology consistently. She was  prescribed Diprosone 0.05% external cream and encouraged follow up with dermatology.     Per chart review, the patient was seen at [facility] on 3/22/2024 for evaluation of dermatitis. Lesions were looking more consistent with dermatitis than chronic hives. She was prescribed betamethasone cream. Planned to follow up with dermatology at the end of the month. Follow in the allergy clinic as needed.       PAST MEDICAL HISTORY:  Past Medical History:   Diagnosis Date    Urinary frequency        PAST SURGICAL HISTORY:  Past Surgical History:   Procedure Laterality Date    MAMMOPLASTY AUGMENTATION Bilateral 2009    TOTAL HIP ARTHROPLASTY Right        CURRENT MEDICATIONS:    Prior to Admission Medications   Prescriptions Last Dose Informant Patient Reported? Taking?   ARIPiprazole (ABILIFY) 5 MG tablet   No No   Sig: Take 1 tablet (5 mg) by mouth daily   Cholecalciferol (VITAMIN D3) 50 MCG (2000 UT) CAPS   No No   Sig: Take 2,000 Units by mouth daily   PARoxetine (PAXIL) 10 MG tablet   Yes No   Sig: Take 10 mg by mouth every morning   acetaminophen (TYLENOL) 500 MG tablet   No No   Sig: Take 2 tablets (1,000 mg) by mouth 3 times daily as needed for pain   balsalazide (COLAZAL) 750 MG capsule   No No   Sig: Take 3 capsules (2,250 mg) by mouth 3 times daily   betamethasone dipropionate (DIPROSONE) 0.05 % external cream   No No   Sig: Apply topically 2 times daily For max of 14 days in a row   diclofenac (VOLTAREN) 1 % topical gel   No No   Sig: Apply 2 g topically 4 times daily   fexofenadine (ALLEGRA) 180 MG tablet   No No   Sig: Take 1 tablet (180 mg) by mouth 2 times daily   fluocinonide (LIDEX) 0.05 % external ointment   No No   Sig: Apply to rash on the neck and body twice daily for 2 weeks. After this, apply the triamcinolone cream twice daily.   Patient not taking: Reported on 3/22/2024   hydrOXYzine (ATARAX) 25 MG tablet   No No   Sig: Take 1 tablet (25 mg) by mouth 3 times daily as needed for  "itching   spacer (OPTICHAMBER GHANSHYAM) holding chamber   No No   Sig: Use with albuterol inhaler   traZODone (DESYREL) 50 MG tablet   No No   Sig: Take 1 tablet (50 mg) by mouth nightly as needed for sleep   triamcinolone (KENALOG) 0.1 % external cream   No No   Sig: Apply topically 2 times daily      Facility-Administered Medications: None       ALLERGIES:  No Known Allergies    FAMILY HISTORY:  Family History   Problem Relation Age of Onset    Breast Cancer No family hx of     Diabetes No family hx of     Hypertension No family hx of     Hyperlipidemia No family hx of     Cerebrovascular Disease No family hx of     Coronary Artery Disease No family hx of        SOCIAL HISTORY:  Social History     Tobacco Use    Smoking status: Never    Smokeless tobacco: Never   Substance Use Topics    Alcohol use: Not Currently     Comment: rarely    Drug use: No        VITALS:    First Vitals:  No data found.      No data found.        PHYSICAL EXAM  VITAL SIGNS: /76   Pulse 74   Temp 98  F (36.7  C) (Oral)   Resp 18   Ht 1.549 m (5' 1\")   Wt 56.6 kg (124 lb 12.8 oz)   SpO2 99%   BMI 23.58 kg/m     GENERAL: Awake, alert, answering questions appropriately, in no acute distress.  HEENT: Conjunctiva clear, no drainage.  No lip or tongue swelling.  Moist mucous membranes.  No oral lesions.  Posterior oropharynx clear with no erythema, no exudate. No tonsillar hypertrophy. Uvula midline. Tolerating secretions, no drooling. No trismus.  No meningismus.  SPEECH:  Easy to understand speech, Normal volume and aneudy. Normal phonation.  PULMONARY: No respiratory distress, Breathing comfortably on room air. Lungs clear to auscultation bilaterally, no wheezing.  No stridor.  CARDIOVASCULAR: Regular rate and rhythm, radial pulses present, symmetric, and normal.  ABDOMINAL: Soft, Nondistended, Nontender, No rebound or guarding. Bowel sounds present.  EXTREMITIES: Extremities are warm and well perfused. No lower extremity " edema.  NEUROLOGIC: GCS 15. Alert, oriented. CN III-XII grossly intact. Moving all extremities spontaneously.   SKIN: Exposed areas of skin warm, dry. Erythematous blanchable plaque with excoriation and overlying scale just distal to hairline along the left side of posterior neck 7 cm in diameter as well as few blanchable erythematous papules to bilateral elbows.  No streaking erythema, purulent drainage, bullae or vesicles, crepitus, pain out of proportion, fluctuance, edema, or increased warmth.  Rash spares palms and soles.  No interdigit webspace lesions.  No petechiae/purpura.  PSYCH: Normal mood and affect.           RADIOLOGY/LAB:  Reviewed all pertinent imaging. Please see official radiology report.  All pertinent labs reviewed and interpreted.         EKG:  None      PROCEDURES:  None      Medical Decision Making  Obtained supplemental history:Supplemental history obtained?: No  Reviewed external records: External records reviewed?: Documented in chart and Outpatient Record: [facility] 8/19/2024  Care impacted by chronic illness:Other: Chronic pruritic rash in adult  Care significantly affected by social determinants of health:Access to Medical Care  Did you consider but not order tests?: Work up considered but not performed and documented in chart, if applicable  Did you interpret images independently?: Independent interpretation of ECG and images noted in documentation, when applicable.  Consultation discussion with other provider:Did you involve another provider (consultant, , pharmacy, etc.)?: No  Discharge. I prescribed additional prescription strength medication(s) as charted. See documentation for any additional details.        CRITICAL CARE:  Not applicable      I, Jessica Michelle, am serving as a scribe to document services personally performed by Maria Fernanda Cohen PA-C, based on my observation and the provider's statements to me. I, Maria Fernanda Cohen,  ROX attest that Jessica Michelle is acting in a scribe capacity, has observed my performance of the services and has documented them in accordance with my direction.         Maria Fernanda Cohen PA-C  Emergency Medicine  [facility] EMERGENCY DEPARTMENT  [address] [place] [id number]  [phone]  Dept: [phone]     Maria Fernanda Cohen PA-C  09/04/24 0616

## 2024-08-21 NOTE — ED NOTES
Has rash to back neck at collar line, reddened raised areas, pt states itchy all over. Worse to neck and elbow area. Has not had any medications

## 2024-08-22 ENCOUNTER — PATIENT OUTREACH (OUTPATIENT)
Dept: CARE COORDINATION | Facility: CLINIC | Age: 54
End: 2024-08-22
Payer: COMMERCIAL

## 2024-08-22 NOTE — PROGRESS NOTES
Clinic Care Coordination Contact  Follow Up Progress Note      Assessment:   The pt was recently in the ED, I called to check up on the pt and help the pt setup a ED follow up. The pt was at [place]  for a rash. I called the pt,but got her vm, so I left a vm for the pt to give me a call back.     Care Gaps:    Health Maintenance Due   Topic Date Due    ADVANCE CARE PLANNING  Never done    DEPRESSION ACTION PLAN  Never done    ZOSTER IMMUNIZATION (1 of 2) Never done    HEPATITIS B IMMUNIZATION (3 of 3 - 19+ 3-dose series) 08/21/2023    YEARLY PREVENTIVE VISIT  08/30/2023    DTAP/TDAP/TD IMMUNIZATION (2 - Td or Tdap) 12/10/2023    COVID-19 Vaccine (6 - 2023-24 season) 12/25/2023

## 2024-08-23 ENCOUNTER — PATIENT OUTREACH (OUTPATIENT)
Dept: CARE COORDINATION | Facility: CLINIC | Age: 54
End: 2024-08-23
Payer: COMMERCIAL

## 2024-08-23 NOTE — PROGRESS NOTES
Clinic Care Coordination Contact  Follow Up Progress Note      Assessment:   The pt was recently in the ED, I called to check up on the pt and help the pt setup a ED follow up. The pt was at [place]  for a rash. I called the pt,but got her vm, so I left a vm for the pt to give me a call back.        Care Gaps:    Health Maintenance Due   Topic Date Due    ADVANCE CARE PLANNING  Never done    DEPRESSION ACTION PLAN  Never done    ZOSTER IMMUNIZATION (1 of 2) Never done    HEPATITIS B IMMUNIZATION (3 of 3 - 19+ 3-dose series) 08/21/2023    YEARLY PREVENTIVE VISIT  08/30/2023    DTAP/TDAP/TD IMMUNIZATION (2 - Td or Tdap) 12/10/2023    COVID-19 Vaccine (6 - 2023-24 season) 12/25/2023

## 2024-09-25 ENCOUNTER — TRANSFERRED RECORDS (OUTPATIENT)
Dept: HEALTH INFORMATION MANAGEMENT | Facility: CLINIC | Age: 54
End: 2024-09-25

## 2024-09-25 LAB
ALT SERPL-CCNC: 21 IU/L (ref 0–32)
AST SERPL-CCNC: 25 IU/L (ref 0–40)
GFR ESTIMATED (EXTERNAL): 108 ML/MIN/1.73
GLUCOSE (EXTERNAL): 83 MG/DL (ref 70–99)
POTASSIUM (EXTERNAL): 4.1 MMOL/L (ref 3.5–5.2)

## 2024-10-11 ENCOUNTER — APPOINTMENT (OUTPATIENT)
Dept: INTERPRETER SERVICES | Facility: CLINIC | Age: 54
End: 2024-10-11
Payer: COMMERCIAL

## 2024-10-11 ENCOUNTER — TELEPHONE (OUTPATIENT)
Dept: ALLERGY | Facility: CLINIC | Age: 54
End: 2024-10-11
Payer: COMMERCIAL

## 2024-10-11 NOTE — TELEPHONE ENCOUNTER
[facility] Call Center    Phone Message    May a detailed message be left on voicemail: yes     Reason for Call: Patient has a bad rash on her neck that is itching - patient is unhappy with January avail - needs to be seen ASAP - please call back [facility] [phone] Thank you    Action Taken: Other: MW ALL    Travel Screening: Not Applicable     Date of Service:

## 2024-10-11 NOTE — TELEPHONE ENCOUNTER
Attempted to call back, no answer. Left message stating no sooner openings, we do have openings in December she can take. Cannot fit in sooner. Can be scheduled as return at first available and if wants a sooner appointment can be added to the wait list.

## 2024-10-24 ENCOUNTER — OFFICE VISIT (OUTPATIENT)
Dept: FAMILY MEDICINE | Facility: CLINIC | Age: 54
End: 2024-10-24
Payer: COMMERCIAL

## 2024-10-24 VITALS
SYSTOLIC BLOOD PRESSURE: 123 MMHG | DIASTOLIC BLOOD PRESSURE: 85 MMHG | OXYGEN SATURATION: 99 % | RESPIRATION RATE: 16 BRPM | WEIGHT: 124.12 LBS | HEIGHT: 60 IN | HEART RATE: 68 BPM | TEMPERATURE: 98.2 F | BODY MASS INDEX: 24.37 KG/M2

## 2024-10-24 DIAGNOSIS — Z23 NEED FOR PROPHYLACTIC VACCINATION AND INOCULATION AGAINST INFLUENZA: ICD-10-CM

## 2024-10-24 DIAGNOSIS — Z23 HIGH PRIORITY FOR 2019-NCOV VACCINE: ICD-10-CM

## 2024-10-24 DIAGNOSIS — M54.41 ACUTE BILATERAL LOW BACK PAIN WITH RIGHT-SIDED SCIATICA: Primary | ICD-10-CM

## 2024-10-24 DIAGNOSIS — F33.41 RECURRENT MAJOR DEPRESSION IN PARTIAL REMISSION (H): ICD-10-CM

## 2024-10-24 DIAGNOSIS — K51.30 ULCERATIVE RECTOSIGMOIDITIS WITHOUT COMPLICATION (H): ICD-10-CM

## 2024-10-24 PROCEDURE — 90471 IMMUNIZATION ADMIN: CPT | Performed by: FAMILY MEDICINE

## 2024-10-24 PROCEDURE — 90673 RIV3 VACCINE NO PRESERV IM: CPT | Performed by: FAMILY MEDICINE

## 2024-10-24 PROCEDURE — 90715 TDAP VACCINE 7 YRS/> IM: CPT | Performed by: FAMILY MEDICINE

## 2024-10-24 PROCEDURE — 99214 OFFICE O/P EST MOD 30 MIN: CPT | Mod: 25 | Performed by: FAMILY MEDICINE

## 2024-10-24 PROCEDURE — 90472 IMMUNIZATION ADMIN EACH ADD: CPT | Performed by: FAMILY MEDICINE

## 2024-10-24 PROCEDURE — 91320 SARSCV2 VAC 30MCG TRS-SUC IM: CPT | Performed by: FAMILY MEDICINE

## 2024-10-24 PROCEDURE — 90480 ADMN SARSCOV2 VAC 1/ONLY CMP: CPT | Performed by: FAMILY MEDICINE

## 2024-10-24 ASSESSMENT — PATIENT HEALTH QUESTIONNAIRE - PHQ9
10. IF YOU CHECKED OFF ANY PROBLEMS, HOW DIFFICULT HAVE THESE PROBLEMS MADE IT FOR YOU TO DO YOUR WORK, TAKE CARE OF THINGS AT HOME, OR GET ALONG WITH OTHER PEOPLE: SOMEWHAT DIFFICULT
SUM OF ALL RESPONSES TO PHQ QUESTIONS 1-9: 7
SUM OF ALL RESPONSES TO PHQ QUESTIONS 1-9: 7

## 2024-10-24 NOTE — PROGRESS NOTES
Assessment & Plan     Acute bilateral low back pain with right-sided sciatica  Discussed stretches and importance of strengthening her sore muscles. It does seem she has a leg length discrepancy on right. Had a hip replacement years ago, but unsure who the surgeon was. I do not have records. Consider going back for orthopedic evaluation  - Physical Therapy  Referral    Need for prophylactic vaccination and inoculation against influenza  Offered and accepted    High priority for 2019-nCoV vaccine  Offered and accepted    Recurrent major depression in partial remission  Doing well. Has mental health therapist. Stable with therapy and medications.    Ulcerative rectosigmoiditis  Diagnosed in 2018. Follows with [facility]. Just seen 9-. Symptoms are stable        The longitudinal plan of care for the diagnosis(es)/condition(s) as documented were addressed during this visit. Due to the added complexity in care, I will continue to support Cindy in the subsequent management and with ongoing continuity of care.            No follow-ups on file.    Vi White is a 54 year old, presenting for the following health issues:  Back Pain (Back pain middle right side/)    HPI     Acute bilateral low back pain with right-sided sciatica  2-3 month history of daily low back pain with radiation now down the right leg. No red flags but symptoms have been more frequent and are limiting daily activities.   She did have a right hip replacement a few years ago. Since that time she notes a leg length discrepancy. She only uses the lift in her tennis shoes, but she does not always wear tennis shoes. She also notes her back is more painful if she sits in a more slouched position but improves if she sits more upright.    Cannot take NSAIDs due to ulcerative rectosigmoiditis history. She was supposed to go to physical therapy after her last visit but her medical insurance lapsed and she was never able to go.    Depression:  Doing well. History of suicidal ideation a few years ago. Mood is stable today. Does have a mental health therapist (Dr. Sharon Padilla)      1/15/2024     8:20 AM 7/19/2024     4:11 PM 10/24/2024     1:07 PM   PHQ   PHQ-9 Total Score 0 13 7    Q9: Thoughts of better off dead/self-harm past 2 weeks Not at all  Not at all  Not at all        Patient-reported     Ulcerative rectosigmoiditis  Diagnosed in 2018. Follows with [facility]. Just seen 9-. Symptoms are stable                Objective    /85   Pulse 68   Temp 98.2  F (36.8  C) (Oral)   Resp 16   Ht 1.524 m (5')   Wt 56.3 kg (124 lb 1.9 oz)   SpO2 99%   BMI 24.24 kg/m    Body mass index is 24.24 kg/m .  Physical Exam   GENERAL: alert and no distress  RESP: lungs clear to auscultation - no rales, rhonchi or wheezes  CV: regular rate and rhythm, normal S1 S2, no S3 or S4, no murmur, click or rub, no peripheral edema  MS: no gross musculoskeletal defects noted, no edema, right leg is shorter            Signed Electronically by: Milli Cooper DO

## 2024-11-20 ENCOUNTER — OFFICE VISIT (OUTPATIENT)
Dept: FAMILY MEDICINE | Facility: CLINIC | Age: 54
End: 2024-11-20
Payer: COMMERCIAL

## 2024-11-20 VITALS
OXYGEN SATURATION: 97 % | RESPIRATION RATE: 20 BRPM | TEMPERATURE: 98 F | SYSTOLIC BLOOD PRESSURE: 106 MMHG | HEIGHT: 61 IN | HEART RATE: 62 BPM | BODY MASS INDEX: 23.56 KG/M2 | DIASTOLIC BLOOD PRESSURE: 68 MMHG | WEIGHT: 124.8 LBS

## 2024-11-20 DIAGNOSIS — N39.41 URGENCY INCONTINENCE: Primary | ICD-10-CM

## 2024-11-20 DIAGNOSIS — K59.00 CONSTIPATION, UNSPECIFIED CONSTIPATION TYPE: ICD-10-CM

## 2024-11-20 DIAGNOSIS — L30.8 OTHER ECZEMA: ICD-10-CM

## 2024-11-20 LAB
ALBUMIN UR-MCNC: NEGATIVE MG/DL
APPEARANCE UR: CLEAR
BILIRUB UR QL STRIP: NEGATIVE
COLOR UR AUTO: YELLOW
GLUCOSE UR STRIP-MCNC: NEGATIVE MG/DL
HGB UR QL STRIP: NEGATIVE
KETONES UR STRIP-MCNC: NEGATIVE MG/DL
LEUKOCYTE ESTERASE UR QL STRIP: NEGATIVE
NITRATE UR QL: NEGATIVE
PH UR STRIP: 6 [PH] (ref 5–7)
SP GR UR STRIP: 1.02 (ref 1–1.03)
UROBILINOGEN UR STRIP-ACNC: 0.2 E.U./DL

## 2024-11-20 RX ORDER — POLYETHYLENE GLYCOL 3350 17 G/17G
1 POWDER, FOR SOLUTION ORAL DAILY
Qty: 100 PACKET | Refills: 3 | Status: SHIPPED | OUTPATIENT
Start: 2024-11-20

## 2024-11-20 RX ORDER — TRIAMCINOLONE ACETONIDE 1 MG/G
CREAM TOPICAL 2 TIMES DAILY
Qty: 454 G | Refills: 0 | Status: SHIPPED | OUTPATIENT
Start: 2024-11-20

## 2024-11-20 RX ORDER — HYDROCORTISONE 25 MG/G
CREAM TOPICAL 2 TIMES DAILY
Qty: 30 G | Refills: 0 | Status: SHIPPED | OUTPATIENT
Start: 2024-11-20

## 2024-11-20 NOTE — PROGRESS NOTES
Assessment & Plan     (N39.41) Urgency incontinence  (primary encounter diagnosis)  Comment: Patient has had 6 months of urinary frequency and urgency with occasional urgency incontinence and a little bit of stress incontinence.  This is in the context of having her last menses about 4 months ago.  No systemic signs of illness no dysuria.  Urgency seems to be triggered by drinking water.  Also has occasional incontinence with coughing/sneezing.  But that is less bothersome to her versus the urgency with drinking water.  It sounds like a story of mixed urinary frequency and incontinence related to her perimenopausal state however would be worthwhile to do a UA and BMP to rule out any bladder or kidney related reason for bladder irritation and frequency.  If those tests are normal I think we will proceed with a perimenopausal mixed urinary frequency and incontinence diagnosis and proceed with physical therapy and Kegel exercises as a first-line intervention.  If that is not working a pelvic exam to evaluate for signs of estrogen deficiency would be in order to evaluate for potential estrogen replacement therapy -likely a vaginal route in the setting of no systemic symptoms of menopause like hot flashes or other autonomic issues.  Plan: UA Macroscopic with reflex to Microscopic and         Culture, Basic metabolic panel, Physical         Therapy  Referral, CANCELED: Physical         Therapy  Referral        -UA and BMP to evaluate for any irritants that could be causing her urinary frequency   -Physical therapy referral for Kegel exercises   -Follow-up if that physical therapy is not helping at which point we will consider next labs which could include a pelvic exam and hormone therapy.    (L30.8) Other eczema  Comment: refills provided  Plan: hydrocortisone 2.5 % cream, triamcinolone         (KENALOG) 0.1 % external cream            (K59.00) Constipation, unspecified constipation type  Comment:  "refills provided  Plan: polyethylene glycol (MIRALAX) 17 g packet                        No follow-ups on file.    Vi White is a 54 year old, presenting for the following health issues:  Refill Request and Frequency (Of urine )      11/20/2024    11:28 AM   Additional Questions   Roomed by jose juan   Accompanied by VICTORINA         11/20/2024    Information    services provided? Yes   Language Hmong   Type of interpretation provided Face-to-face    name Kishoer Araujo    Agency [employer]        HPI     Pre-Provider Visit Orders- Urinalysis UA/UC  Patient reports the following symptoms:  frequent urination , urgency.   Does the patient report any of the following symptoms: vaginal discharge, vaginal itching, possible yeast infection, has a urinary catheter in place, or unable to void in a specimen cup?  No    {OK to proceed with order Link to Pre-Provider Visit Standing Orders SmartSet :[id number]}      Frequent urination with some urgency after drinking water. Will go back urinate right afterwards. Anytime she drinks water -> 1-2 times urinating.     Every 15 minutes after drinking water.     No incontinence, burning, fever, chills,   Urgency can lead to some spilling of urine.     No caffeine or cigarettes. 1 coffee weekly.     This has been going on for 6 months.       Medication refill: polyethyline glycol   Hydrocortisone 2.5%  Triamcinolone 0.1%    Periods? Last in may or june          Objective    /68   Pulse 62   Temp 98  F (36.7  C) (Oral)   Resp 20   Ht 1.545 m (5' 0.83\")   Wt 56.6 kg (124 lb 12.8 oz)   LMP 06/03/2024 (Approximate)   SpO2 97%   BMI 23.72 kg/m    Body mass index is 23.72 kg/m .  Physical Exam   GENERAL: Healthy, alert and no distress  EYES: Eyes grossly normal to inspection.  No discharge or erythema, or obvious scleral/conjunctival abnormalities.  RESP:  Lungs clear throughout. No wheeze or crackles.   CV: Heart RRR. No murmur  MSK: No gross " deformity. Normal tone.  SKIN: Visible skin clear. No significant rash, abnormal pigmentation or lesions.  NEURO: Cranial nerves grossly intact.  Mentation and speech appropriate for age.  PSYCH: Mentation appears normal, affect normal/bright, judgement and insight intact, normal speech and appearance well-groomed.      No results found for this or any previous visit (from the past 24 hours).        Signed Electronically by: Cole Reyes MD  {Email feedback regarding this note to [email or web address]   :[id number]}

## 2024-11-20 NOTE — PATIENT INSTRUCTIONS
[facility] will call you to coordinate your care as prescribed by your provider. If you don't hear from a representative within 2 business days, please call [phone].       I believe your increased urination is due some loss of muscle control related to menopause. It is extremely common and the first thing to try is therapy to regain strength in those muscles.

## 2024-11-21 LAB
ANION GAP SERPL CALCULATED.3IONS-SCNC: 8 MMOL/L (ref 7–15)
BUN SERPL-MCNC: 12.5 MG/DL (ref 6–20)
CALCIUM SERPL-MCNC: 9 MG/DL (ref 8.8–10.4)
CHLORIDE SERPL-SCNC: 107 MMOL/L (ref 98–107)
CREAT SERPL-MCNC: 0.57 MG/DL (ref 0.51–0.95)
EGFRCR SERPLBLD CKD-EPI 2021: >90 ML/MIN/1.73M2
GLUCOSE SERPL-MCNC: 108 MG/DL (ref 70–99)
HCO3 SERPL-SCNC: 26 MMOL/L (ref 22–29)
POTASSIUM SERPL-SCNC: 4.1 MMOL/L (ref 3.4–5.3)
SODIUM SERPL-SCNC: 141 MMOL/L (ref 135–145)

## 2024-12-09 ENCOUNTER — TRANSFERRED RECORDS (OUTPATIENT)
Dept: HEALTH INFORMATION MANAGEMENT | Facility: CLINIC | Age: 54
End: 2024-12-09
Payer: COMMERCIAL

## 2024-12-11 ENCOUNTER — TELEPHONE (OUTPATIENT)
Dept: FAMILY MEDICINE | Facility: CLINIC | Age: 54
End: 2024-12-11
Payer: COMMERCIAL

## 2024-12-11 NOTE — TELEPHONE ENCOUNTER
Forms/Letter Request    Type of form/letter: Therapy Plan: [facility] PHYSICAL THERAPY       Do we have the form/letter: Yes:     Who is the form from? [facility]TH[facility]     Where did/will the form come from? form was faxed in    When is form/letter needed by:     How would you like the form/letter returned: Fax : [phone]    Patient Notified form requests are processed in 5-7 business days:    Okay to leave a detailed message?:

## 2025-01-07 ENCOUNTER — ANCILLARY PROCEDURE (OUTPATIENT)
Dept: GENERAL RADIOLOGY | Facility: CLINIC | Age: 55
End: 2025-01-07
Attending: FAMILY MEDICINE
Payer: COMMERCIAL

## 2025-01-07 ENCOUNTER — OFFICE VISIT (OUTPATIENT)
Dept: FAMILY MEDICINE | Facility: CLINIC | Age: 55
End: 2025-01-07
Payer: COMMERCIAL

## 2025-01-07 VITALS
BODY MASS INDEX: 23.98 KG/M2 | TEMPERATURE: 98.1 F | RESPIRATION RATE: 16 BRPM | SYSTOLIC BLOOD PRESSURE: 118 MMHG | DIASTOLIC BLOOD PRESSURE: 71 MMHG | HEART RATE: 61 BPM | OXYGEN SATURATION: 99 % | WEIGHT: 127 LBS | HEIGHT: 61 IN

## 2025-01-07 DIAGNOSIS — M25.559 CHRONIC HIP PAIN AFTER TOTAL REPLACEMENT OF HIP JOINT: Primary | ICD-10-CM

## 2025-01-07 DIAGNOSIS — G89.29 CHRONIC HIP PAIN AFTER TOTAL REPLACEMENT OF HIP JOINT: Primary | ICD-10-CM

## 2025-01-07 DIAGNOSIS — F33.1 MODERATE RECURRENT MAJOR DEPRESSION (H): ICD-10-CM

## 2025-01-07 DIAGNOSIS — G89.29 CHRONIC BILATERAL LOW BACK PAIN WITH RIGHT-SIDED SCIATICA: ICD-10-CM

## 2025-01-07 DIAGNOSIS — K51.311 ULCERATIVE RECTOSIGMOIDITIS WITH RECTAL BLEEDING (H): ICD-10-CM

## 2025-01-07 DIAGNOSIS — Z96.649 CHRONIC HIP PAIN AFTER TOTAL REPLACEMENT OF HIP JOINT: Primary | ICD-10-CM

## 2025-01-07 DIAGNOSIS — M54.41 CHRONIC BILATERAL LOW BACK PAIN WITH RIGHT-SIDED SCIATICA: ICD-10-CM

## 2025-01-07 PROCEDURE — G2211 COMPLEX E/M VISIT ADD ON: HCPCS | Performed by: FAMILY MEDICINE

## 2025-01-07 PROCEDURE — 72100 X-RAY EXAM L-S SPINE 2/3 VWS: CPT | Mod: TC | Performed by: RADIOLOGY

## 2025-01-07 PROCEDURE — 99214 OFFICE O/P EST MOD 30 MIN: CPT | Performed by: FAMILY MEDICINE

## 2025-01-07 ASSESSMENT — PATIENT HEALTH QUESTIONNAIRE - PHQ9
SUM OF ALL RESPONSES TO PHQ QUESTIONS 1-9: 11
SUM OF ALL RESPONSES TO PHQ QUESTIONS 1-9: 11
10. IF YOU CHECKED OFF ANY PROBLEMS, HOW DIFFICULT HAVE THESE PROBLEMS MADE IT FOR YOU TO DO YOUR WORK, TAKE CARE OF THINGS AT HOME, OR GET ALONG WITH OTHER PEOPLE: EXTREMELY DIFFICULT

## 2025-01-07 NOTE — PATIENT INSTRUCTIONS
Follows with [facility]    Phone: [phone] [address] [place] [address]    According to website there is no wait time, she may consider calling for appointment at later date or could go in for evaluation today. She is concerned about the hardware. Offered greater trochanteric injection, but patient declined.

## 2025-01-07 NOTE — PROGRESS NOTES
"  {PROVIDER CHARTING PREFERENCE:157984}    Vi White is a 54 year old, presenting for the following health issues:  RECHECK (Follow up back pain, pain is not getting better/)  {(!) Visit Details have not yet been documented.  Please enter Visit Details and then use this list to pull in documentation. (Optional):081956}  HPI     {MA/LPN/RN Pre-Provider Visit Orders- hCG/UA/Strep (Optional):544728}  {SUPERLIST (Optional):562746}  {additonal problems for provider to add (Optional):634922}    6-month history of daily low back pain with radiation now down the right leg. No red flags but symptoms have been more frequent and are limiting daily activities.   She did have a right hip replacement a few years ago. Since that time she notes a leg length discrepancy. She only uses the lift in her tennis shoes, but she does not always wear tennis shoes. She also notes her back is more painful if she sits in a more slouched position but improves if she sits more upright.    Has been going to physical therapy at [facility] for her right sided back pain and had 4-5 sessions per patient, I only have session from 12-10-24 to review. Stated she saw the surgeon and was given Tylenol #3. Has been taking 1000mg of tylenol at a time, but no more than 3 times per day        {ROS Picklists (Optional):601375}      Objective    /71   Pulse 61   Temp 98.1  F (36.7  C) (Oral)   Resp 16   Ht 1.549 m (5' 1\")   Wt 57.6 kg (127 lb)   LMP 06/03/2024 (Approximate)   SpO2 99%   BMI 24.00 kg/m    Body mass index is 24 kg/m .  Physical Exam   {Exam List (Optional):938691}    {Diagnostic Test Results (Optional):707095}        Signed Electronically by: Milli Cooper DO  {Email feedback regarding this note to [email or web address]   :729203}  Answers submitted by the patient for this visit:  Patient Health Questionnaire (Submitted on 1/7/2025)  If you checked off any problems, how " difficult have these problems made it for you to do your work, take care of things at home, or get along with other people?: Extremely difficult  PHQ9 TOTAL SCORE: 11

## 2025-01-11 PROBLEM — F33.1 MODERATE RECURRENT MAJOR DEPRESSION (H): Status: ACTIVE | Noted: 2018-10-23

## 2025-01-11 RX ORDER — MESALAMINE 4 G/60ML
60 SUSPENSION RECTAL PRN
COMMUNITY
Start: 2024-12-09

## 2025-01-12 ENCOUNTER — OFFICE VISIT (OUTPATIENT)
Dept: URGENT CARE | Facility: URGENT CARE | Age: 55
End: 2025-01-12
Payer: COMMERCIAL

## 2025-01-12 VITALS
RESPIRATION RATE: 20 BRPM | OXYGEN SATURATION: 96 % | SYSTOLIC BLOOD PRESSURE: 127 MMHG | TEMPERATURE: 97.4 F | DIASTOLIC BLOOD PRESSURE: 76 MMHG | HEART RATE: 62 BPM

## 2025-01-12 DIAGNOSIS — J40 BRONCHITIS: Primary | ICD-10-CM

## 2025-01-12 PROCEDURE — 99213 OFFICE O/P EST LOW 20 MIN: CPT | Performed by: FAMILY MEDICINE

## 2025-01-12 RX ORDER — MESALAMINE 4 G/60ML
SUSPENSION RECTAL
COMMUNITY
Start: 2024-10-17

## 2025-01-12 RX ORDER — BENZONATATE 100 MG/1
100 CAPSULE ORAL 3 TIMES DAILY PRN
Qty: 30 CAPSULE | Refills: 0 | Status: SHIPPED | OUTPATIENT
Start: 2025-01-12

## 2025-01-12 ASSESSMENT — ENCOUNTER SYMPTOMS
COUGH: 1
PSYCHIATRIC NEGATIVE: 1
CARDIOVASCULAR NEGATIVE: 1
SHORTNESS OF BREATH: 1
NEUROLOGICAL NEGATIVE: 1
MUSCULOSKELETAL NEGATIVE: 1
EYES NEGATIVE: 1
ENDOCRINE NEGATIVE: 1
WHEEZING: 0
CONSTITUTIONAL NEGATIVE: 1
GASTROINTESTINAL NEGATIVE: 1
HEMATOLOGIC/LYMPHATIC NEGATIVE: 1

## 2025-01-12 NOTE — PROGRESS NOTES
SUBJECTIVE:   Cindy Smith is a 54 year old female presenting with a chief complaint of   Chief Complaint   Patient presents with    Cough     X a week. No fever. Pt states some cramping pain bilateral calves. No wheezing but some SOB. Headaches when coughing fit.       She is an established patient of [facility].    URI Adult    Onset of symptoms was 1 week(s) ago.  Course of illness is waxing and waning.    Severity moderate  Current and Associated symptoms: cough - non-productive and shortness of breath  Treatment measures tried include Tylenol/Ibuprofen and Decongestants.  Predisposing factors include None.      Patient is a 54 yr old female here for cough ongoing for a week.She reports that the cough is mostly dry.   She has had some mild shortness of breath but no wheezing. She has not been in contact with persons with similar symptoms.    Review of Systems   Constitutional: Negative.    HENT:  Positive for congestion.    Eyes: Negative.    Respiratory:  Positive for cough and shortness of breath. Negative for wheezing.    Cardiovascular: Negative.    Gastrointestinal: Negative.    Endocrine: Negative.    Genitourinary: Negative.    Musculoskeletal: Negative.    Neurological: Negative.    Hematological: Negative.    Psychiatric/Behavioral: Negative.         Past Medical History:   Diagnosis Date    Urinary frequency      Family History   Problem Relation Age of Onset    Breast Cancer No family hx of     Diabetes No family hx of     Hypertension No family hx of     Hyperlipidemia No family hx of     Cerebrovascular Disease No family hx of     Coronary Artery Disease No family hx of      Current Outpatient Medications   Medication Sig Dispense Refill    acetaminophen (TYLENOL) 500 MG tablet Take 2 tablets (1,000 mg) by mouth 3 times daily as needed for pain 100 tablet 1    balsalazide (COLAZAL) 750 MG capsule Take 3 capsules (2,250 mg) by mouth 3 times daily 270 capsule 2    benzonatate (TESSALON) 100 MG capsule  Take 1 capsule (100 mg) by mouth 3 times daily as needed for cough. 30 capsule 0    betamethasone dipropionate (DIPROSONE) 0.05 % external cream Apply topically 2 times daily For max of 14 days in a row 45 g 0    cetirizine (ZYRTEC) 10 MG tablet Take 1 tablet (10 mg) by mouth daily 30 tablet 0    Cholecalciferol (VITAMIN D3) 50 MCG (2000 UT) CAPS Take 2,000 Units by mouth daily 90 capsule 3    diclofenac (VOLTAREN) 1 % topical gel Apply 2 g topically 4 times daily 100 g 1    mesalamine (ROWASA) 4 g enema INSERT 60 MILIMITER RECTALLY EVERY DAY AT BEDTIME X4 WEEKS AND THEN RESUME AS NEEDED FOR COLITIS SYMTPOMS      Mesalamine-Cleanser (ROWASA) 4 g KIT Place 60 mLs rectally as needed (colitis symptoms).      polyethylene glycol (MIRALAX) 17 g packet Take 17 g by mouth daily. 100 packet 3    triamcinolone (KENALOG) 0.1 % external cream Apply topically 2 times daily. 454 g 0     Social History     Tobacco Use    Smoking status: Never    Smokeless tobacco: Never   Substance Use Topics    Alcohol use: Not Currently     Comment: rarely       OBJECTIVE  /76   Pulse 62   Temp 97.4  F (36.3  C) (Tympanic)   Resp 20   LMP 06/03/2024 (Approximate)   SpO2 96%     Physical Exam  Constitutional:       Appearance: Normal appearance.   HENT:      Head: Normocephalic and atraumatic.      Right Ear: Tympanic membrane normal.      Left Ear: Tympanic membrane normal.      Nose: Nose normal.   Cardiovascular:      Rate and Rhythm: Normal rate and regular rhythm.      Pulses: Normal pulses.      Heart sounds: Normal heart sounds.   Pulmonary:      Effort: Pulmonary effort is normal. No respiratory distress.      Breath sounds: Normal breath sounds. No stridor. No wheezing, rhonchi or rales.   Chest:      Chest wall: No tenderness.   Musculoskeletal:         General: Normal range of motion.      Cervical back: Normal range of motion and neck supple.   Skin:     General: Skin is warm and dry.   Neurological:      General: No focal  deficit present.      Mental Status: She is alert and oriented to person, place, and time.   Psychiatric:         Mood and Affect: Mood normal.         Behavior: Behavior normal.         Labs:  No results found for this or any previous visit (from the past 24 hours).    X-Ray was not done.    ASSESSMENT:      ICD-10-CM    1. Bronchitis  J40 benzonatate (TESSALON) 100 MG capsule         Likely viral in nature. Recommend increase in fluids. Cough medication faxed.    Medical Decision Making:    Differential Diagnosis:  URI Adult/Peds:  Bronchitis-viral    Serious Comorbid Conditions:  Adult:  None    PLAN:    URI Adult:  Tylenol, Ibuprofen, Fluids, Rest, and OTC cough suppressant/expectorant    Followup:    If not improving or if condition worsens, follow up with your Primary Care Provider    There are no Patient Instructions on file for this visit.

## 2025-01-13 ENCOUNTER — APPOINTMENT (OUTPATIENT)
Dept: INTERPRETER SERVICES | Facility: CLINIC | Age: 55
End: 2025-01-13
Payer: COMMERCIAL

## 2025-01-16 ENCOUNTER — OFFICE VISIT (OUTPATIENT)
Dept: FAMILY MEDICINE | Facility: CLINIC | Age: 55
End: 2025-01-16
Payer: COMMERCIAL

## 2025-01-16 VITALS
TEMPERATURE: 98.1 F | HEART RATE: 70 BPM | OXYGEN SATURATION: 99 % | WEIGHT: 129.4 LBS | RESPIRATION RATE: 16 BRPM | SYSTOLIC BLOOD PRESSURE: 127 MMHG | HEIGHT: 61 IN | DIASTOLIC BLOOD PRESSURE: 62 MMHG | BODY MASS INDEX: 24.43 KG/M2

## 2025-01-16 DIAGNOSIS — Z96.649 CHRONIC HIP PAIN AFTER TOTAL REPLACEMENT OF HIP JOINT: ICD-10-CM

## 2025-01-16 DIAGNOSIS — G89.29 CHRONIC BILATERAL LOW BACK PAIN WITH RIGHT-SIDED SCIATICA: ICD-10-CM

## 2025-01-16 DIAGNOSIS — G89.29 CHRONIC HIP PAIN AFTER TOTAL REPLACEMENT OF HIP JOINT: ICD-10-CM

## 2025-01-16 DIAGNOSIS — M25.559 CHRONIC HIP PAIN AFTER TOTAL REPLACEMENT OF HIP JOINT: ICD-10-CM

## 2025-01-16 DIAGNOSIS — J00 ACUTE NASOPHARYNGITIS: Primary | ICD-10-CM

## 2025-01-16 DIAGNOSIS — M54.41 CHRONIC BILATERAL LOW BACK PAIN WITH RIGHT-SIDED SCIATICA: ICD-10-CM

## 2025-01-16 PROCEDURE — G2211 COMPLEX E/M VISIT ADD ON: HCPCS | Performed by: FAMILY MEDICINE

## 2025-01-16 PROCEDURE — 99214 OFFICE O/P EST MOD 30 MIN: CPT | Performed by: FAMILY MEDICINE

## 2025-01-16 RX ORDER — GUAIFENESIN 200 MG/10ML
200 LIQUID ORAL EVERY 6 HOURS PRN
Qty: 240 ML | Refills: 0 | Status: SHIPPED | OUTPATIENT
Start: 2025-01-16

## 2025-01-16 NOTE — PROGRESS NOTES
"  {PROVIDER CHARTING PREFERENCE:053111}    Vi White is a 54 year old, presenting for the following health issues:  RECHECK (Follow up back pain, cough)  {(!) Visit Details have not yet been documented.  Please enter Visit Details and then use this list to pull in documentation. (Optional):168717}  HPI     {MA/LPN/RN Pre-Provider Visit Orders- hCG/UA/Strep (Optional):445530}  {SUPERLIST (Optional):654388}    Was seen in UC with cough on 1-. Not getting any better. Feels shortness of breath.     She has stopped trazodone, paroxetine and abilify, but we did not have those on her list. May have been seeing a mental health provider    History of colitis: Has appointment with [facility] on 1-  {additonal problems for provider to add (Optional):478083}    {ROS Picklists (Optional):186215}      Objective    /62   Pulse 70   Temp 98.1  F (36.7  C) (Oral)   Resp 16   Ht 1.549 m (5' 1\")   Wt 58.7 kg (129 lb 6.4 oz)   LMP 06/03/2024 (Approximate)   SpO2 99%   BMI 24.45 kg/m    Body mass index is 24.45 kg/m .  Physical Exam   GENERAL: alert and no distress  HENT: ear canals and TM's normal, nose is with clear discharge. Mouth without ulcers or lesions  NECK: no adenopathy, no asymmetry, masses, or scars  RESP: lungs clear to auscultation - no rales, rhonchi or wheezes  CV: regular rate and rhythm, normal S1 S2, no S3 or S4, no murmur, click or rub, no peripheral edema  MS: no gross musculoskeletal defects noted, no edema    {Diagnostic Test Results (Optional):680176}        Signed Electronically by: Milli Cooper DO  {Email feedback regarding this note to [email or web address]   :232051}  " discharge. Mouth without ulcers or lesions  NECK: no adenopathy, no asymmetry, masses, or scars  RESP: lungs clear to auscultation - no rales, rhonchi or wheezes  CV: regular rate and rhythm, normal S1 S2, no S3 or S4, no murmur, click or rub, no peripheral edema  MS: no gross musculoskeletal defects noted, no edema            Signed Electronically by: Milli Cooper DO

## 2025-01-21 PROBLEM — Z96.641 HISTORY OF RIGHT HIP REPLACEMENT: Status: ACTIVE | Noted: 2025-01-21

## 2025-01-21 NOTE — PROGRESS NOTES
Documents from ortho were received and reviewed. Plan for lumbar spine MRI. Felt the right hip was stable with no concerns of the artifical joint.

## 2025-01-24 ENCOUNTER — APPOINTMENT (OUTPATIENT)
Dept: CT IMAGING | Facility: HOSPITAL | Age: 55
End: 2025-01-24
Payer: COMMERCIAL

## 2025-01-24 ENCOUNTER — HOSPITAL ENCOUNTER (EMERGENCY)
Facility: HOSPITAL | Age: 55
Discharge: HOME | End: 2025-01-24
Attending: EMERGENCY MEDICINE | Admitting: EMERGENCY MEDICINE
Payer: COMMERCIAL

## 2025-01-24 VITALS
RESPIRATION RATE: 16 BRPM | OXYGEN SATURATION: 99 % | HEIGHT: 61 IN | HEART RATE: 70 BPM | TEMPERATURE: 98.1 F | DIASTOLIC BLOOD PRESSURE: 74 MMHG | SYSTOLIC BLOOD PRESSURE: 134 MMHG | WEIGHT: 127 LBS | BODY MASS INDEX: 23.98 KG/M2

## 2025-01-24 DIAGNOSIS — K91.840 COLONOSCOPY CAUSING POST-PROCEDURAL BLEEDING: ICD-10-CM

## 2025-01-24 DIAGNOSIS — N83.202 OVARIAN CYST, LEFT: ICD-10-CM

## 2025-01-24 LAB
ABO + RH BLD: NORMAL
ALBUMIN SERPL BCG-MCNC: 4.2 G/DL (ref 3.5–5.2)
ALP SERPL-CCNC: 84 U/L (ref 40–150)
ALT SERPL W P-5'-P-CCNC: 17 U/L (ref 0–50)
ANION GAP SERPL CALCULATED.3IONS-SCNC: 6 MMOL/L (ref 7–15)
AST SERPL W P-5'-P-CCNC: 25 U/L (ref 0–45)
BASOPHILS # BLD AUTO: 0.1 10E3/UL (ref 0–0.2)
BASOPHILS NFR BLD AUTO: 1 %
BILIRUB SERPL-MCNC: 0.5 MG/DL
BLD GP AB SCN SERPL QL: NEGATIVE
BUN SERPL-MCNC: 10.7 MG/DL (ref 6–20)
CALCIUM SERPL-MCNC: 9 MG/DL (ref 8.8–10.4)
CHLORIDE SERPL-SCNC: 108 MMOL/L (ref 98–107)
CREAT SERPL-MCNC: 0.62 MG/DL (ref 0.51–0.95)
EGFRCR SERPLBLD CKD-EPI 2021: >90 ML/MIN/1.73M2
EOSINOPHIL # BLD AUTO: 0.3 10E3/UL (ref 0–0.7)
EOSINOPHIL NFR BLD AUTO: 4 %
ERYTHROCYTE [DISTWIDTH] IN BLOOD BY AUTOMATED COUNT: 12.8 % (ref 10–15)
GLUCOSE SERPL-MCNC: 97 MG/DL (ref 70–99)
HCO3 SERPL-SCNC: 28 MMOL/L (ref 22–29)
HCT VFR BLD AUTO: 43.5 % (ref 35–47)
HEMOCCULT STL QL: POSITIVE
HGB BLD-MCNC: 14.3 G/DL (ref 11.7–15.7)
IMM GRANULOCYTES # BLD: 0 10E3/UL
IMM GRANULOCYTES NFR BLD: 0 %
INR PPP: 0.95 (ref 0.85–1.15)
LYMPHOCYTES # BLD AUTO: 1.8 10E3/UL (ref 0.8–5.3)
LYMPHOCYTES NFR BLD AUTO: 27 %
MCH RBC QN AUTO: 29.7 PG (ref 26.5–33)
MCHC RBC AUTO-ENTMCNC: 32.9 G/DL (ref 31.5–36.5)
MCV RBC AUTO: 90 FL (ref 78–100)
MONOCYTES # BLD AUTO: 0.5 10E3/UL (ref 0–1.3)
MONOCYTES NFR BLD AUTO: 7 %
NEUTROPHILS # BLD AUTO: 4 10E3/UL (ref 1.6–8.3)
NEUTROPHILS NFR BLD AUTO: 61 %
NRBC # BLD AUTO: 0 10E3/UL
NRBC BLD AUTO-RTO: 0 /100
PLATELET # BLD AUTO: 200 10E3/UL (ref 150–450)
POTASSIUM SERPL-SCNC: 4.3 MMOL/L (ref 3.4–5.3)
PROT SERPL-MCNC: 6.9 G/DL (ref 6.4–8.3)
RBC # BLD AUTO: 4.82 10E6/UL (ref 3.8–5.2)
SODIUM SERPL-SCNC: 142 MMOL/L (ref 135–145)
SPECIMEN EXP DATE BLD: NORMAL
WBC # BLD AUTO: 6.5 10E3/UL (ref 4–11)

## 2025-01-24 PROCEDURE — 86850 RBC ANTIBODY SCREEN: CPT | Performed by: EMERGENCY MEDICINE

## 2025-01-24 PROCEDURE — 99285 EMERGENCY DEPT VISIT HI MDM: CPT | Mod: 25

## 2025-01-24 PROCEDURE — 85014 HEMATOCRIT: CPT | Performed by: EMERGENCY MEDICINE

## 2025-01-24 PROCEDURE — 250N000011 HC RX IP 250 OP 636

## 2025-01-24 PROCEDURE — 85004 AUTOMATED DIFF WBC COUNT: CPT | Performed by: EMERGENCY MEDICINE

## 2025-01-24 PROCEDURE — 36415 COLL VENOUS BLD VENIPUNCTURE: CPT | Performed by: EMERGENCY MEDICINE

## 2025-01-24 PROCEDURE — 82040 ASSAY OF SERUM ALBUMIN: CPT | Performed by: EMERGENCY MEDICINE

## 2025-01-24 PROCEDURE — 82272 OCCULT BLD FECES 1-3 TESTS: CPT

## 2025-01-24 PROCEDURE — 74174 CTA ABD&PLVS W/CONTRAST: CPT

## 2025-01-24 PROCEDURE — 85610 PROTHROMBIN TIME: CPT | Performed by: EMERGENCY MEDICINE

## 2025-01-24 PROCEDURE — 86900 BLOOD TYPING SEROLOGIC ABO: CPT | Performed by: EMERGENCY MEDICINE

## 2025-01-24 PROCEDURE — 85041 AUTOMATED RBC COUNT: CPT | Performed by: EMERGENCY MEDICINE

## 2025-01-24 RX ORDER — IOPAMIDOL 755 MG/ML
90 INJECTION, SOLUTION INTRAVASCULAR ONCE
Status: COMPLETED | OUTPATIENT
Start: 2025-01-24 | End: 2025-01-24

## 2025-01-24 RX ADMIN — IOPAMIDOL 90 ML: 755 INJECTION, SOLUTION INTRAVENOUS at 14:51

## 2025-01-24 ASSESSMENT — COLUMBIA-SUICIDE SEVERITY RATING SCALE - C-SSRS
1. IN THE PAST MONTH, HAVE YOU WISHED YOU WERE DEAD OR WISHED YOU COULD GO TO SLEEP AND NOT WAKE UP?: NO
6. HAVE YOU EVER DONE ANYTHING, STARTED TO DO ANYTHING, OR PREPARED TO DO ANYTHING TO END YOUR LIFE?: NO
2. HAVE YOU ACTUALLY HAD ANY THOUGHTS OF KILLING YOURSELF IN THE PAST MONTH?: NO

## 2025-01-24 ASSESSMENT — ACTIVITIES OF DAILY LIVING (ADL)
ADLS_ACUITY_SCORE: 41

## 2025-01-24 NOTE — ED PROVIDER NOTES
Emergency Department Encounter   NAME: Cindy Smith ; AGE: 54 year old female ; YOB: 1970 ; MRN: [id number] ; PCP: Milli Cooper   ED PROVIDER: José Luis Lemus PA-C    Evaluation Date & Time:   1/24/2025  1:10 PM    CHIEF COMPLAINT:  Rectal Bleeding      Impression and Plan   MDM: Cindy Smith is a 54 year old female who presents to the ED for evaluation of rectal bleeding. Patient has been having large bloody bowel movements today following her colonoscopy yesterday. She knows they took a biopsy but is unaware of anything else done during the procedure. She denies any abdominal or rectal pain, and I did not visualize any blood in her rectum on exam. I performed an occult blood stool test, and there was only a small speck of blood on my glove. No evidence of hemorrhoids or anal fissure on exam. Her hemoglobin is normal at 14.3. CTA abdomen pelvis showed no acute gastrointestinal bleeding and evidence of mild wall thickening of the distal sigmoid colon and rectum, which she was already aware of from her colonoscopy. The only other finding from CTA was a 3.5 cm ovarian cyst. I notified the patient of this and we discussed that she should follow up with her PCP or OB/GYN about this cyst, as it can become painful or possibly cause torsion in the future. I educated the patient that it is not uncommon to have bleeding the first few days after colonoscopies and that she should follow up with her GI provider for any further questions about her rectal bleeding. She is ok to safely discharge home at this time.       ED COURSE:  1:16 PM I met and introduced myself to the patient. I gathered initial history and performed my physical exam. We discussed plan for initial workup.       1:54 PM I have staffed the patient with Dr. Richardson ED MD, who has evaluated the patient and agrees with all aspects of today's care.   3:30 PM I rechecked the patient and discussed results, discharge, follow up, and reasons to return to  the ED.     At the conclusion of the encounter I discussed the results of all the tests and the disposition. The questions were answered. The patient or family acknowledged understanding and was agreeable with the care plan.    Medical Decision Making  Obtained supplemental history:Supplemental history obtained?: No  Reviewed external records: External records reviewed?: No  Care impacted by chronic illness:Documented in Chart  Did you consider but not order tests?: Work up considered but not performed and documented in chart, if applicable  Did you interpret images independently?: Independent interpretation of ECG and images noted in documentation, when applicable.  Consultation discussion with other provider:Did you involve another provider (consultant, , pharmacy, etc.)?: No  Discharge. No recommendations on prescription strength medication(s). See documentation for any additional details.    MIPS: Not Applicable    FINAL IMPRESSION:    ICD-10-CM    1. Colonoscopy causing post-procedural bleeding  K91.840       2. Ovarian cyst, left  N83.202             MEDICATIONS GIVEN IN THE EMERGENCY DEPARTMENT:  Medications   iopamidol (ISOVUE-370) solution 90 mL (90 mLs Intravenous $Given 1/24/25 1117)         NEW PRESCRIPTIONS STARTED AT TODAY'S ED VISIT:  New Prescriptions    No medications on file         HPI   Use of Intrepreter: interpretation used on telephone.    Cindy Smith is a 54 year old female with a pertinent history of rectal bleeding and recent colonoscopy who presents to the ED for evaluation of blood in stool.  Patient has been having small amounts of blood in the stool for 2 years, and she had a colonoscopy yesterday for evaluation of this condition.  Today she woke up and had a significant amount of blood in each of her bowel movements and urgency with bowel movements. She denies any abdominal or rectal pain. She states she was also dizzy this morning, which concerned her. She had one biopsy during the  "colonoscopy but is unsure of anything else done. She denies any history of gastric ulcers.    Denies nausea, vomiting, SOB, chest pain.       REVIEW OF SYSTEMS:  Pertinent positive and negative symptoms per HPI.       Medical History     Past Medical History:   Diagnosis Date    Urinary frequency        Past Surgical History:   Procedure Laterality Date    MAMMOPLASTY AUGMENTATION Bilateral 01/01/2009    TOTAL HIP ARTHROPLASTY Right 01/20/2015    Dr. Nice for [facility]       Family History   Problem Relation Age of Onset    Breast Cancer No family hx of     Diabetes No family hx of     Hypertension No family hx of     Hyperlipidemia No family hx of     Cerebrovascular Disease No family hx of     Coronary Artery Disease No family hx of        Social History     Tobacco Use    Smoking status: Never    Smokeless tobacco: Never   Substance Use Topics    Alcohol use: Not Currently     Comment: rarely    Drug use: No       acetaminophen (TYLENOL) 500 MG tablet  balsalazide (COLAZAL) 750 MG capsule  benzonatate (TESSALON) 100 MG capsule  betamethasone dipropionate (DIPROSONE) 0.05 % external cream  cetirizine (ZYRTEC) 10 MG tablet  Cholecalciferol (VITAMIN D3) 50 MCG (2000 UT) CAPS  diclofenac (VOLTAREN) 1 % topical gel  guaiFENesin (ROBITUSSIN) 20 mg/mL liquid  mesalamine (ROWASA) 4 g enema  Mesalamine-Cleanser (ROWASA) 4 g KIT  polyethylene glycol (MIRALAX) 17 g packet  triamcinolone (KENALOG) 0.1 % external cream          Physical Exam     First Vitals:  Patient Vitals for the past 24 hrs:   BP Temp Pulse Resp SpO2 Height Weight   01/24/25 1236 -- 98.1  F (36.7  C) -- -- -- -- --   01/24/25 1235 -- -- -- -- -- 1.549 m (5' 1\") 57.6 kg (127 lb)   01/24/25 1233 (!) 141/82 -- 70 16 98 % -- --         PHYSICAL EXAM:   Physical Exam  Constitutional:       General: She is not in acute distress.     Appearance: Normal appearance. She is not diaphoretic.   HENT:      Head: Atraumatic.      Mouth/Throat:      Mouth: Mucous " membranes are moist.   Eyes:      General: No scleral icterus.     Conjunctiva/sclera: Conjunctivae normal.   Cardiovascular:      Rate and Rhythm: Normal rate.      Heart sounds: Normal heart sounds.   Pulmonary:      Effort: No respiratory distress.      Breath sounds: Normal breath sounds.   Abdominal:      General: Abdomen is flat. There is no distension.      Palpations: Abdomen is soft. There is no mass.      Tenderness: There is no abdominal tenderness.   Genitourinary:     Rectum: Normal.      Comments: No blood seen in or around rectum, and only small radha of blood seen on glove after exam  Musculoskeletal:         General: Normal range of motion.      Cervical back: Neck supple.   Skin:     General: Skin is warm.      Findings: No rash.   Neurological:      General: No focal deficit present.      Mental Status: She is alert and oriented to person, place, and time.      Cranial Nerves: No cranial nerve deficit.   Psychiatric:         Mood and Affect: Mood normal.             Results     LAB:  All pertinent labs reviewed and interpreted  Labs Ordered and Resulted from Time of ED Arrival to Time of ED Departure   COMPREHENSIVE METABOLIC PANEL - Abnormal       Result Value    Sodium 142      Potassium 4.3      Carbon Dioxide (CO2) 28      Anion Gap 6 (*)     Urea Nitrogen 10.7      Creatinine 0.62      GFR Estimate >90      Calcium 9.0      Chloride 108 (*)     Glucose 97      Alkaline Phosphatase 84      AST 25      ALT 17      Protein Total 6.9      Albumin 4.2      Bilirubin Total 0.5     OCCULT BLOOD STOOL - Abnormal    Occult Blood Positive (*)    INR - Normal    INR 0.95     CBC WITH PLATELETS AND DIFFERENTIAL    WBC Count 6.5      RBC Count 4.82      Hemoglobin 14.3      Hematocrit 43.5      MCV 90      MCH 29.7      MCHC 32.9      RDW 12.8      Platelet Count 200      % Neutrophils 61      % Lymphocytes 27      % Monocytes 7      % Eosinophils 4      % Basophils 1      % Immature Granulocytes 0       NRBCs per 100 WBC 0      Absolute Neutrophils 4.0      Absolute Lymphocytes 1.8      Absolute Monocytes 0.5      Absolute Eosinophils 0.3      Absolute Basophils 0.1      Absolute Immature Granulocytes 0.0      Absolute NRBCs 0.0     TYPE AND SCREEN, ADULT    ABO/RH(D) A POS      Antibody Screen Negative      SPECIMEN EXPIRATION DATE [id number]     ABO/RH TYPE AND SCREEN       RADIOLOGY:  CTA Abdomen Pelvis with Contrast   Final Result   IMPRESSION:      1.  No evidence of active gastrointestinal bleeding.      2.  Mild wall thickening of the distal sigmoid colon and rectum, which could either reflect pseudothickening from its decompressed state or a mild proctocolitis.      3.  Simple 3.5 cm left ovarian cyst. Recommend further evaluation with a nonemergent pelvic ultrasound.            PROCEDURES:  none        José Luis Lemus PA-C   Emergency Medicine   [facility] EMERGENCY DEPARTMENT        José Luis Lemus PA-C  01/24/25 1543       José Luis Lemus PA-C  01/24/25 1544

## 2025-01-24 NOTE — DISCHARGE INSTRUCTIONS
Emergency department evaluation today is reassuring.  We did not find any active bleeding in your gastrointestinal system on CT today.  Your hemoglobin was normal.  Bleeding with bowel movements for the first few days after a colonoscopy is not uncommon.  For any other nonemergent questions, please reach out to your GI provider.  We also found evidence of a left ovarian cyst.  Please follow-up with your PCP or OB/GYN for further evaluation and monitoring of this, as it may eventually cause pain or possible torsion.  Please continue to monitor for symptoms and return to the ED if they significantly worsen.

## 2025-01-24 NOTE — ED PROVIDER NOTES
"Emergency Department Midlevel Supervisory Note     I had a face to face encounter with this patient seen by the Advanced Practice Provider (JOHANA). I personally made/approved the management plan and take responsibility for the patient management. I personally saw patient and performed a substantive portion of the visit including all aspects of the medical decision making.     ED Course:  1:33 PM  José Luis Lemus PA-C  staffed patient with me. I agree with their assessment and plan of management, and I will see the patient.  1:40 PM I met with the patient to introduce myself, gather additional history, perform my initial exam, and discuss the plan.   3:30 PM CTA neg for active bleeding. Does have simple, left ovarian cyst that's incidental.  Pt advised on this, outpatient follow up US with primary or obgyn clinic.  No pain to suggest need for further emergent workup in ED.    3:31 PM Pt counseled on expected course, follow up, return precautions.      Brief HPI:     Cindy Smith is a 54 year old female who presents for evaluation of rectal bleeding starting today with bowel movements only.  Pt had colonoscopy yesterday (uncertain with who) with reported biopsy, uncertain if any polypectomy or other procedures.  Pt reports blood ins tool 3 times today, moderate amount per her history. Denies abdominal pain, dyspnea, syncope, anticoagulation.      I, Lico Agrawal, am serving as a scribe to document services personally performed by Woodrow Richardson DO, based on my observations and the provider's statements to me.   I, Woodrow Richardson DO, attest that Lico Agrawal was acting in a scribe capacity, has observed my performance of the services and has documented them in accordance with my direction.    Brief Physical Exam: BP (!) 141/82   Pulse 70   Temp 98.1  F (36.7  C)   Resp 16   Ht 1.549 m (5' 1\")   Wt 57.6 kg (127 lb)   SpO2 98%   BMI 24.00 kg/m    Physical Exam  Vitals and nursing note reviewed.   Constitutional:  "      General: She is not in acute distress.     Appearance: Normal appearance.   HENT:      Head: Normocephalic and atraumatic.      Nose: Nose normal.      Mouth/Throat:      Mouth: Mucous membranes are moist.   Eyes:      Pupils: Pupils are equal, round, and reactive to light.   Cardiovascular:      Rate and Rhythm: Normal rate and regular rhythm.      Pulses: Normal pulses.           Radial pulses are 2+ on the right side and 2+ on the left side.        Dorsalis pedis pulses are 2+ on the right side and 2+ on the left side.   Pulmonary:      Effort: Pulmonary effort is normal. No respiratory distress.      Breath sounds: Normal breath sounds.   Abdominal:      Palpations: Abdomen is soft.      Tenderness: There is no abdominal tenderness.   Genitourinary:     Comments: Rectal exam per POP Ordoenz  Musculoskeletal:      Cervical back: Full passive range of motion without pain and neck supple.      Comments: No calf tenderness or swelling b/l   Skin:     General: Skin is warm.      Findings: No rash.   Neurological:      General: No focal deficit present.      Mental Status: She is alert. Mental status is at baseline.      Comments: Fluent speech, no acute lateralizing deficits   Psychiatric:         Mood and Affect: Mood normal.         Behavior: Behavior normal.            MDM:  Pt seen in conjunction with JOHANA José Luis Lemus.  Pt had colonoscopy yesterday, woke up today and ultimately had up to 3 bloody bowel movements.  Reports feeling  a little lightheaded earlier.  Pt reports she had biopsy of colon during procedure yesterday.  Uncertain if polyps or other procedures and unable to see note in system.  Pt denies any active bleeding inbetween stools and no dark stool.  Denies abdominal or rectal pain.  No cp/sob. Pt not anticoagulated. Agree with labs, CTA to rule out active bleeding.  José Luis performed rectal exam and very minimal blood on rectal exam with no active bleeding.  Vitally well and anticipate  discharge, expected course and outpatient follow up.  Likely post-procedure/biopsy bleeding.  My H&P obtained with professional  services.         1. Colonoscopy causing post-procedural bleeding    2. Ovarian cyst, left        Consults:  none    Labs and Imaging:  Results for orders placed or performed during the hospital encounter of 01/24/25   CTA Abdomen Pelvis with Contrast    Impression    IMPRESSION:    1.  No evidence of active gastrointestinal bleeding.    2.  Mild wall thickening of the distal sigmoid colon and rectum, which could either reflect pseudothickening from its decompressed state or a mild proctocolitis.    3.  Simple 3.5 cm left ovarian cyst. Recommend further evaluation with a nonemergent pelvic ultrasound.   Result Value Ref Range    INR 0.95 0.85 - 1.15   Comprehensive metabolic panel   Result Value Ref Range    Sodium 142 135 - 145 mmol/L    Potassium 4.3 3.4 - 5.3 mmol/L    Carbon Dioxide (CO2) 28 22 - 29 mmol/L    Anion Gap 6 (L) 7 - 15 mmol/L    Urea Nitrogen 10.7 6.0 - 20.0 mg/dL    Creatinine 0.62 0.51 - 0.95 mg/dL    GFR Estimate >90 >60 mL/min/1.73m2    Calcium 9.0 8.8 - 10.4 mg/dL    Chloride 108 (H) 98 - 107 mmol/L    Glucose 97 70 - 99 mg/dL    Alkaline Phosphatase 84 40 - 150 U/L    AST 25 0 - 45 U/L    ALT 17 0 - 50 U/L    Protein Total 6.9 6.4 - 8.3 g/dL    Albumin 4.2 3.5 - 5.2 g/dL    Bilirubin Total 0.5 <=1.2 mg/dL   CBC with platelets and differential   Result Value Ref Range    WBC Count 6.5 4.0 - 11.0 10e3/uL    RBC Count 4.82 3.80 - 5.20 10e6/uL    Hemoglobin 14.3 11.7 - 15.7 g/dL    Hematocrit 43.5 35.0 - 47.0 %    MCV 90 78 - 100 fL    MCH 29.7 26.5 - 33.0 pg    MCHC 32.9 31.5 - 36.5 g/dL    RDW 12.8 10.0 - 15.0 %    Platelet Count 200 150 - 450 10e3/uL    % Neutrophils 61 %    % Lymphocytes 27 %    % Monocytes 7 %    % Eosinophils 4 %    % Basophils 1 %    % Immature Granulocytes 0 %    NRBCs per 100 WBC 0 <1 /100    Absolute Neutrophils 4.0 1.6 - 8.3 10e3/uL     Absolute Lymphocytes 1.8 0.8 - 5.3 10e3/uL    Absolute Monocytes 0.5 0.0 - 1.3 10e3/uL    Absolute Eosinophils 0.3 0.0 - 0.7 10e3/uL    Absolute Basophils 0.1 0.0 - 0.2 10e3/uL    Absolute Immature Granulocytes 0.0 <=0.4 10e3/uL    Absolute NRBCs 0.0 10e3/uL   Occult blood stool   Result Value Ref Range    Occult Blood Positive (A) Negative   Adult Type and Screen   Result Value Ref Range    ABO/RH(D) A POS     Antibody Screen Negative Negative    SPECIMEN EXPIRATION DATE [id number]        I have reviewed the relevant laboratory studies above.    I independently interpreted the following imaging study(s):       EKG: I reviewed and independently interpreted the patient's EKG, with comments made as listed below. Please see scanned EKG for full report.   none    Procedures:  I was present for the key portions of procedures documented in JOHANA/midlevel note, see midlevel note for further details.    Woodrow Richardson [facility] EMERGENCY DEPARTMENT  [address] [place] [id number] [phone]     Woodrow Richardson MD  01/24/25 9344

## 2025-01-24 NOTE — ED TRIAGE NOTES
"Patient was triaged using the ipad [employer] . Patient reported that she has had rectal bleeding. Patient reported she could not make it to the bathroom in time, as the \"blood was gushing out\". Patient has a colonoscopy yesterday. Increased fatigued today      Triage Assessment (Adult)       Row Name 01/24/25 1232          Triage Assessment    Airway WDL WDL        Respiratory WDL    Respiratory WDL WDL        Skin Circulation/Temperature WDL    Skin Circulation/Temperature WDL WDL        Cardiac WDL    Cardiac WDL WDL        Peripheral/Neurovascular WDL    Peripheral Neurovascular WDL WDL        Cognitive/Neuro/Behavioral WDL    Cognitive/Neuro/Behavioral WDL WDL                     "

## 2025-01-28 ENCOUNTER — OFFICE VISIT (OUTPATIENT)
Dept: FAMILY MEDICINE | Facility: CLINIC | Age: 55
End: 2025-01-28
Payer: COMMERCIAL

## 2025-01-28 VITALS
BODY MASS INDEX: 23.86 KG/M2 | WEIGHT: 126.4 LBS | HEIGHT: 61 IN | OXYGEN SATURATION: 99 % | DIASTOLIC BLOOD PRESSURE: 74 MMHG | SYSTOLIC BLOOD PRESSURE: 118 MMHG | TEMPERATURE: 98 F | RESPIRATION RATE: 16 BRPM | HEART RATE: 54 BPM

## 2025-01-28 DIAGNOSIS — M25.559 CHRONIC HIP PAIN AFTER TOTAL REPLACEMENT OF HIP JOINT: ICD-10-CM

## 2025-01-28 DIAGNOSIS — N83.202 LEFT OVARIAN CYST: ICD-10-CM

## 2025-01-28 DIAGNOSIS — G89.29 CHRONIC HIP PAIN AFTER TOTAL REPLACEMENT OF HIP JOINT: ICD-10-CM

## 2025-01-28 DIAGNOSIS — K51.311 ULCERATIVE RECTOSIGMOIDITIS WITH RECTAL BLEEDING (H): Primary | ICD-10-CM

## 2025-01-28 DIAGNOSIS — Z96.649 CHRONIC HIP PAIN AFTER TOTAL REPLACEMENT OF HIP JOINT: ICD-10-CM

## 2025-01-28 LAB
ERYTHROCYTE [DISTWIDTH] IN BLOOD BY AUTOMATED COUNT: 12.6 % (ref 10–15)
HCT VFR BLD AUTO: 43 % (ref 35–47)
HGB BLD-MCNC: 13.9 G/DL (ref 11.7–15.7)
MCH RBC QN AUTO: 29.3 PG (ref 26.5–33)
MCHC RBC AUTO-ENTMCNC: 32.3 G/DL (ref 31.5–36.5)
MCV RBC AUTO: 91 FL (ref 78–100)
PLATELET # BLD AUTO: 200 10E3/UL (ref 150–450)
RBC # BLD AUTO: 4.75 10E6/UL (ref 3.8–5.2)
WBC # BLD AUTO: 6.1 10E3/UL (ref 4–11)

## 2025-01-28 NOTE — PROGRESS NOTES
Assessment & Plan     Ulcerative rectosigmoiditis with rectal bleeding (H)  Recent colonoscopy, just picked up the prednisone that was prescribed on 1-, has follow up appointment with [facility]. Will get records from [facility] regarding recent visit.  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 They did order a lumbar MRI which she states she completed last week, but I do not have the results to review.                  No follow-ups on file.    The longitudinal plan of care for the diagnosis(es)/condition(s) as documented were addressed during this visit. Due to the added complexity in care, I will continue to support Cindy in the subsequent management and with ongoing continuity of care.    Vi White is a 54 year old, presenting for the following health issues:  RECHECK (Follow up - [facility]/)    HPI       ED/UC Followup:    Facility:  [facility] ED  Date of visit: 1-  Reason for visit: Rectal bleeding. Had a colonoscopy on 1- due to routine surveillance for her colitis. Had bleeding Hgb was stable.       Left ovarian cyst- recommended non-emergent pelvic ultrasound.  Current Status: Still having some bleeding but back to the baseline of her bleeding from the colitis.   She does have follow up appointment on 2- with [facility]    Ulcerative rectosigmoiditis: Had a colonoscopy last week with [facility], after the colonoscopy she continued to have bleeding and more amount of was enough she was concerned so went to the ED. She has not yet increased her mesalamine to twice a day.    URI symptoms have resolved.    Review of [facility]: Had lumbar MRI done last week. Jeff Gallagher - Has therapist appointment 3/2025. Wondering about medical exemption to citizenship exam due to  "emotional distress. She will discuss with her therapist.          Objective    /74   Pulse 54   Temp 98  F (36.7  C) (Oral)   Resp 16   Ht 1.549 m (5' 1\")   Wt 57.3 kg (126 lb 6.4 oz)   SpO2 99%   BMI 23.88 kg/m    Body mass index is 23.88 kg/m .  Physical Exam   GENERAL: alert and no distress  RESP: lungs clear to auscultation - no rales, rhonchi or wheezes  CV: regular rate and rhythm, normal S1 S2, no S3 or S4, no murmur, click or rub, no peripheral edema  MS: no gross musculoskeletal defects noted, no edema            Signed Electronically by: Milli Cooper,     "

## 2025-01-30 ENCOUNTER — APPOINTMENT (OUTPATIENT)
Dept: INTERPRETER SERVICES | Facility: CLINIC | Age: 55
End: 2025-01-30
Payer: COMMERCIAL

## 2025-02-07 ENCOUNTER — TELEPHONE (OUTPATIENT)
Dept: DERMATOLOGY | Facility: CLINIC | Age: 55
End: 2025-02-07
Payer: COMMERCIAL

## 2025-02-07 PROBLEM — N83.202 LEFT OVARIAN CYST: Status: ACTIVE | Noted: 2025-02-07

## 2025-02-07 NOTE — PROGRESS NOTES
Medication Therapy Management (MTM) Encounter    ASSESSMENT:                            Eczematous dermatitis, favor atopic: Uncontrolled, currently only using topicals as she is not in a flare at this point.  However since she flares often, we discussed that Dupixent will be a great option for her to prevent future flares.  We discussed Dupixent in detail including dosing/frequency, side effects, expectations, administration, storage, and the specialty pharmacy process.  We did review administration on the phone, but she felt comfortable watching a video with her son who speaks English, therefore I will email him the video link.  I did also provide with her the phone number to [facility] pharmacy to set up her refill.  Will follow-up with patient in about 3 months, after initial follow-up with therapy management team at the pharmacy.    Ulcerative rectosigmoiditis: Continue current regimen.    Vitamin D Deficiency: Last vitamin D level was slightly low, but old value therefore consider rechecking in the future.    PLAN:                            1.  Dupixent 300 mg x 2 (600 mg) once, then 300 mg every 14 days approved.  Patient to contact [facility] (#[phone]) to set up delivery  2.  Administration video emailed to patient's son ([email or web address])    Follow-up: 3-month MTM follow-up, Derm 5/29/2025    SUBJECTIVE/OBJECTIVE:                          Cindy Smith is a 54 year old female seen for an initial visit. She was referred to me from Marybel Amanda and Jay. [place] [facility] [place]    Reason for visit: Per Dr. Amanda and Jay: eczematous dermatitis, favor atopic. Please assist with starting dupixent   Medication Adherence/Access: no issues reported.    Eczematous dermatitis, favor atopic:   -Dupixent 300 mg x 2 (600 mg) once, then 300 mg every 14 days   (PA approved 2/7/25 - 6/7/25, SP)  - tacrolimus 0.1% ointment twice daily as needed for  maintenance  - clobetasol 0.05% ointment twice daily as needed for severe flares, for up to 2 weeks at a time  Last dermatology appointment 2/6/2025.  Rash has been chronic, with onset 2 to 3 years ago with intermittent flaring every 2 months. No warning, can come a few times a month. Currently the rash is not active, only a small rash on her neck.  When the rash occurs, it does affect her quality of life significantly, especially the itching.   In adequate control with topical steroids.  It was recommended to increase her topical steroid to clobetasol (replaced betamethasone and triamcinolone), start tacrolimus, and start Dupixent.  Was reviewed that if unresolved, consider referral to patch testing.  Locations: Neck right now. Using clobetasol, helpful.   Has never given herself an injection before.     Ulcerative rectosigmoiditis:   -Balsalazide 750 mg x 3 (2250 mg) 3 times daily  -Mesalamine enema 4 g as needed  -MiraLAX daily   Reports she continues to have some occasional bleeding. Denies any issues with constipation as long as she takes Miralax daily.     Vitamin D Deficiency:   - Vitamin D 2000 units daily  Lab Results   Component Value Date    VITDT 21 11/16/2023    VITDT 30 08/30/2022         Today's Vitals: There were no vitals taken for this visit.    Allergies/ADRs: Reviewed in chart  Past Medical History: Reviewed in chart  Tobacco: She reports that she has never smoked. She has never used smokeless tobacco.  Alcohol: reviewed in chart    ----------------  Post Discharge Medication Reconciliation Status: discharge medications reconciled and changed, per note/orders.    I spent 46 minutes with this patient today. All changes were made via collaborative practice agreement with Jodi Amanda.     A summary of these recommendations was declined by the patient.    Hyun Urias, Pharm.D., BCACP   Medication Therapy Management Pharmacist   [facility]    Telemedicine Visit  Details  The patient's medications can be safely assessed via a telemedicine encounter.  Type of service:  Telephone visit  Originating Location (pt. Location): Home    Distant Location (provider location):  Off-site  Start Time:  11:00 AM  End Time: 11:46 AM     Medication Therapy Recommendations  No medication therapy recommendations to display

## 2025-02-07 NOTE — TELEPHONE ENCOUNTER
PA Initiation    Medication: DUPIXENT 300 MG/2ML SC SOAJ  Insurance Company: [employer] - Phone [phone] Fax [phone]  Pharmacy Filling the Rx: [facility] - [place], [place] - [facility] [address]  Filling Pharmacy Phone: [phone]  Filling Pharmacy Fax: [phone]  Start Date: 2/7/2025         Thank you,     Edy Gil [facility]  Pharmacy Clinic [facility]  [email or web address]   Phone: [phone]  Fax: [phone]

## 2025-02-09 NOTE — TELEPHONE ENCOUNTER
Prior Authorization Approval    Medication: DUPIXENT 300 MG/2ML SC SOAJ  Authorization Effective Date: 2/7/2025  Authorization Expiration Date: 6/7/2025  Approved Dose/Quantity: 6 ml per 28 days loading dose, 4 ml per 28 days maintenance dose  Reference #: [id number]   Insurance Company: [employer] - Phone [phone] Fax [phone]  Expected CoPay: $ 0  CoPay Card Available:      Financial Assistance Needed: No  Which Pharmacy is filling the prescription: [facility] - [address]  Pharmacy Notified: Yes  Patient Notified: Yes **pharmacy will reach out to pt to schedule delivery**             Thank you,     Edy Gil CPhT  Pharmacy Clinic [facility] [email or web address]   Phone: [phone]  Fax: [phone]

## 2025-02-11 ENCOUNTER — VIRTUAL VISIT (OUTPATIENT)
Dept: PHARMACY | Facility: CLINIC | Age: 55
End: 2025-02-11
Attending: DERMATOLOGY
Payer: COMMERCIAL

## 2025-02-11 DIAGNOSIS — K62.89 PROCTITIS: ICD-10-CM

## 2025-02-11 DIAGNOSIS — E55.9 VITAMIN D DEFICIENCY: ICD-10-CM

## 2025-02-11 DIAGNOSIS — L29.89 CHRONIC PRURITIC RASH IN ADULT: Primary | ICD-10-CM

## 2025-02-11 PROCEDURE — T1013 SIGN LANG/ORAL INTERPRETER: HCPCS | Mod: U4,TEL,95 | Performed by: INTERPRETER

## 2025-02-24 ENCOUNTER — OFFICE VISIT (OUTPATIENT)
Dept: FAMILY MEDICINE | Facility: CLINIC | Age: 55
End: 2025-02-24
Payer: COMMERCIAL

## 2025-02-24 VITALS
SYSTOLIC BLOOD PRESSURE: 121 MMHG | TEMPERATURE: 98 F | HEART RATE: 78 BPM | BODY MASS INDEX: 29.65 KG/M2 | OXYGEN SATURATION: 97 % | RESPIRATION RATE: 16 BRPM | DIASTOLIC BLOOD PRESSURE: 82 MMHG | HEIGHT: 56 IN | WEIGHT: 131.8 LBS

## 2025-02-24 DIAGNOSIS — K51.311 ULCERATIVE RECTOSIGMOIDITIS WITH RECTAL BLEEDING (H): ICD-10-CM

## 2025-02-24 DIAGNOSIS — E55.9 VITAMIN D DEFICIENCY: ICD-10-CM

## 2025-02-24 DIAGNOSIS — M51.369 L4-L5 DISC BULGE: ICD-10-CM

## 2025-02-24 DIAGNOSIS — L30.8 OTHER ECZEMA: ICD-10-CM

## 2025-02-24 DIAGNOSIS — N83.202 LEFT OVARIAN CYST: Primary | ICD-10-CM

## 2025-02-24 DIAGNOSIS — F33.1 MODERATE RECURRENT MAJOR DEPRESSION (H): ICD-10-CM

## 2025-02-24 LAB
ERYTHROCYTE [DISTWIDTH] IN BLOOD BY AUTOMATED COUNT: 14.1 % (ref 10–15)
HCT VFR BLD AUTO: 45.9 % (ref 35–47)
HGB BLD-MCNC: 14.6 G/DL (ref 11.7–15.7)
MCH RBC QN AUTO: 29.7 PG (ref 26.5–33)
MCHC RBC AUTO-ENTMCNC: 31.8 G/DL (ref 31.5–36.5)
MCV RBC AUTO: 94 FL (ref 78–100)
PLATELET # BLD AUTO: 223 10E3/UL (ref 150–450)
RBC # BLD AUTO: 4.91 10E6/UL (ref 3.8–5.2)
WBC # BLD AUTO: 9 10E3/UL (ref 4–11)

## 2025-02-24 PROCEDURE — 90746 HEPB VACCINE 3 DOSE ADULT IM: CPT | Performed by: FAMILY MEDICINE

## 2025-02-24 PROCEDURE — 99214 OFFICE O/P EST MOD 30 MIN: CPT | Mod: 25 | Performed by: FAMILY MEDICINE

## 2025-02-24 PROCEDURE — 85027 COMPLETE CBC AUTOMATED: CPT | Performed by: FAMILY MEDICINE

## 2025-02-24 PROCEDURE — G2211 COMPLEX E/M VISIT ADD ON: HCPCS | Performed by: FAMILY MEDICINE

## 2025-02-24 PROCEDURE — 82306 VITAMIN D 25 HYDROXY: CPT | Performed by: FAMILY MEDICINE

## 2025-02-24 PROCEDURE — 90471 IMMUNIZATION ADMIN: CPT | Performed by: FAMILY MEDICINE

## 2025-02-24 PROCEDURE — 36415 COLL VENOUS BLD VENIPUNCTURE: CPT | Performed by: FAMILY MEDICINE

## 2025-02-24 ASSESSMENT — PATIENT HEALTH QUESTIONNAIRE - PHQ9
SUM OF ALL RESPONSES TO PHQ QUESTIONS 1-9: 16
10. IF YOU CHECKED OFF ANY PROBLEMS, HOW DIFFICULT HAVE THESE PROBLEMS MADE IT FOR YOU TO DO YOUR WORK, TAKE CARE OF THINGS AT HOME, OR GET ALONG WITH OTHER PEOPLE: VERY DIFFICULT
SUM OF ALL RESPONSES TO PHQ QUESTIONS 1-9: 16

## 2025-02-24 NOTE — PROGRESS NOTES
Assessment & Plan     L4-L5 disc bulge:  - Consult with orthopedic specialists to review MRI results and determine if a steroid injection is appropriate. Schedule this consultation promptly.    Left ovarian cyst:  - Schedule a repeat ultrasound in six months to monitor for changes. If the cyst becomes painful or grows significantly, consider surgical intervention.    Ulcerative rectosigmoiditis with rectal bleeding (H):  - Attend the follow-up appointment to discuss medication adjustments for bleeding.    Vitamin D deficiency:  - Draw blood today to check the vitamin D level.         Moderate recurrent depression: Will address at next visit. She had stopped her medications for depression previously.    Hep #3 given    The longitudinal plan of care for the diagnosis(es)/condition(s) as documented were addressed during this visit. Due to the added complexity in care, I will continue to support Cindy in the subsequent management and with ongoing continuity of care.        No follow-ups on file.    Vi White is a 54 year old, presenting for the following health issues:  RECHECK (Follow up ultrasound)        2/24/2025     7:59 AM   Additional Questions   Roomed by noe         2/24/2025    Information    services provided? Yes   Language Hmong   Type of interpretation provided Face-to-face    name Lorelei    Agency [employer]     Consent was obtained from the patient to use an AI documentation tool in the creation of this note.  HPI           History of Present Illness    Cindy Smith, 54 years    Ulcerative Colitis - Rectal Bleeding  Reports ongoing rectal bleeding with each bowel movement. Severity has decreased, as it no longer drips when sitting on the toilet. Scheduled for a follow-up appointment to discuss medication adjustments for the bleeding with [facility] today. Recently had a colonoscopy.    Low Back and Right Hip Pain  Experienced low back and hip pain, consulted  "with orthopedic doctors at [facility]. Underwent some physical therapy and had an MRI of the low back. MRI report indicates a bulging disc hitting the nerves, more on the left side, though pain is mostly on the right. Awaiting further review by orthopedic specialists.    Ovarian Cyst  A cyst on the left ovary was identified during a CT scan in the emergency department. Recent ultrasound showed the cyst has increased in size from 3 cm to 4 cm. The cyst appears benign, and she is monitoring for any changes or symptoms.    Vitamin D Deficiency  Has a history of vitamin D deficiency, with the last level in November 2023 being 21. Not currently taking any supplementation. It was recommended at a virtual Pharm D visit to have the value repeated.     Depression:      10/24/2024     1:07 PM 1/7/2025    11:15 AM 2/24/2025     7:57 AM   PHQ   PHQ-9 Total Score 7  11  16    Q9: Thoughts of better off dead/self-harm past 2 weeks Not at all Not at all Not at all       Patient-reported     Feeling overwhelmed with medical concerns.          Objective    /82   Pulse 78   Temp 98  F (36.7  C) (Oral)   Resp 16   Ht 1.422 m (4' 8\")   Wt 59.8 kg (131 lb 12.8 oz)   SpO2 97%   BMI 29.55 kg/m    Body mass index is 29.55 kg/m .  Physical Exam   GENERAL: alert and no distress  RESP: lungs clear to auscultation - no rales, rhonchi or wheezes  CV: regular rate and rhythm, normal S1 S2, no S3 or S4, no murmur, click or rub, no peripheral edema  MS: no gross musculoskeletal defects noted, no edema, no pain with palpation of calf muscles.            Signed Electronically by: Milli Cooper DO    "

## 2025-02-24 NOTE — PATIENT INSTRUCTIONS
Based on our discussion, I have outlined the following instructions for you:    - Schedule an ultrasound in six months to check the cyst on your left ovary.  - Attend your follow-up appointment to talk about adjusting your medication for rectal bleeding with [facility].  - Get your blood drawn today to check your vitamin D level.  - continue physical therapy for back and hip pain.  - make appointment with [facility] to review MRI results    Thank you again for your visit, and we look forward to supporting you in your journey to better health.

## 2025-02-25 ENCOUNTER — PHARMACY VISIT (OUTPATIENT)
Dept: PHARMACY | Facility: CLINIC | Age: 55
End: 2025-02-25
Payer: COMMERCIAL

## 2025-02-25 LAB — VIT D+METAB SERPL-MCNC: 21 NG/ML (ref 20–50)

## 2025-02-25 NOTE — PROGRESS NOTES
Atopic Dermatitis Clinical Follow Up Assessment    Activity Medications    DUPIXENT    Summary Notes  Spoke with pts significant other, Ivan, for assessment. Current Regimen: Dupixent 600mg once then 300mg every other week     Dosing Schedule: They gave two pens 2/18, due for one pen 3/4. They had a pen malfunction with their first delivery. They used the 3rd pen from the package to complete the 2-pen loading dose. They will need another pen by 3/4. Ivan says he talked with [employer] and they said they would reach out to us about reship. Sent email to [employer] replacement team to investigate. Maintenance dose is too soon until 3/3, but we could get it to them if needed.     Tolerability: Denies SE/ISR. No issues aside from the misfire.     AD: Seeing some improvement. Not having any itching anymore. Their main goal is to prevent flares. Generally between flares she was doing ok. So if they can prevent or lessen the severity of the flares, they will be happy.     Follow up: Will check back next week to make sure they can get the replacement or our refill in time.    Danelle Victoria, AlessandroD  Therapy Management Pharmacist  [facility]

## 2025-02-28 DIAGNOSIS — E55.9 VITAMIN D DEFICIENCY: ICD-10-CM

## 2025-03-01 RX ORDER — ACETAMINOPHEN 160 MG
2000 TABLET,DISINTEGRATING ORAL DAILY
Qty: 90 CAPSULE | Refills: 3 | Status: SHIPPED | OUTPATIENT
Start: 2025-03-01

## 2025-03-03 NOTE — TELEPHONE ENCOUNTER
They have not gotten Dupixent replacement from the manufacture yet. They will call [employer] again and get case number to provide to us to facilitate the replacement. In the meantime, am able to fill the maintenance dose through insurance and will deliver tomorrow in time for their dose.     Danelle Victoria, PharmD  Therapy Management Pharmacist  [facility]

## 2025-04-03 ENCOUNTER — ANCILLARY PROCEDURE (OUTPATIENT)
Dept: ULTRASOUND IMAGING | Facility: CLINIC | Age: 55
End: 2025-04-03
Attending: FAMILY MEDICINE
Payer: COMMERCIAL

## 2025-04-03 DIAGNOSIS — N83.202 LEFT OVARIAN CYST: ICD-10-CM

## 2025-04-03 PROCEDURE — 76856 US EXAM PELVIC COMPLETE: CPT | Mod: TC | Performed by: RADIOLOGY

## 2025-04-03 PROCEDURE — 76830 TRANSVAGINAL US NON-OB: CPT | Mod: TC | Performed by: RADIOLOGY

## 2025-04-03 NOTE — NURSING NOTE
Due to patient being non-English speaking/uses sign language, an  was used for this visit. Only for face-to-face interpretation by an external agency, date and length of interpretation can be found on the scanned worksheet.     name: Ashley   Agency: [employer]  Language: Debbieong   Telephone number: .  Type of interpretation: Face-to-face, spoken

## 2025-04-24 ENCOUNTER — OFFICE VISIT (OUTPATIENT)
Dept: FAMILY MEDICINE | Facility: CLINIC | Age: 55
End: 2025-04-24
Payer: COMMERCIAL

## 2025-04-24 VITALS
OXYGEN SATURATION: 94 % | TEMPERATURE: 98.3 F | HEART RATE: 69 BPM | BODY MASS INDEX: 24.35 KG/M2 | RESPIRATION RATE: 20 BRPM | HEIGHT: 61 IN | WEIGHT: 129 LBS | SYSTOLIC BLOOD PRESSURE: 124 MMHG | DIASTOLIC BLOOD PRESSURE: 85 MMHG

## 2025-04-24 DIAGNOSIS — M79.604 PAIN OF RIGHT LOWER EXTREMITY: ICD-10-CM

## 2025-04-24 DIAGNOSIS — R51.9 FACIAL PAIN: Primary | ICD-10-CM

## 2025-04-24 DIAGNOSIS — Z92.241 S/P EPIDURAL STEROID INJECTION: ICD-10-CM

## 2025-04-24 PROCEDURE — 99214 OFFICE O/P EST MOD 30 MIN: CPT | Mod: GC

## 2025-04-24 PROCEDURE — 3079F DIAST BP 80-89 MM HG: CPT

## 2025-04-24 PROCEDURE — 3074F SYST BP LT 130 MM HG: CPT

## 2025-04-24 NOTE — PROGRESS NOTES
"  Assessment & Plan     Facial pain  ***              {Follow-up (Optional):415594}    Vi White is a 54 year old, presenting for the following health issues:  Nostrils pain (Pain/) and Pain (R leg pain)    Nostrils:   -pain on left side of nose, clicking noise when she presses   -pain radiates down to chin and radiates to her teeth   -clear drainage from nostril   -no recent illness  -has been going on for over 2 years  -pain now worse & now noticed clicking noise     -pain scale & frequent pain, shooting   -tobacco use? Alcohol? Smoker?       R leg pain:   -injection lower back either Monday or Tuesday   -now right hip numb  -difficulty going up stairs    -hx of L4-L5 disc bulge   -reports having an injection last week (steroid?)  - hx of right hip replacement.   - feels crampy , can radiate to her knee     -neurologically stable today. Urgert follow up with [facility]   -        4/24/2025     2:01 PM   Additional Questions   Roomed by Paw   Accompanied by N/A         4/24/2025    Information    services provided? Yes   Language Hmong   Type of interpretation provided Face-to-face    name Chi    Agency [employer]       {ROS Picklists (Optional):821604}      Objective    /85   Pulse 69   Temp 98.3  F (36.8  C) (Oral)   Resp 20   Ht 1.55 m (5' 1.02\")   Wt 58.5 kg (129 lb)   SpO2 94%   BMI 24.36 kg/m    Body mass index is 24.36 kg/m .  Physical Exam   {Exam List (Optional):890594}    {Diagnostic Test Results (Optional):968393}        Signed Electronically by: Marie Morales MD  {Email feedback regarding this note to [email or web address]   :591921}  " "noticed clicking noise   -non smoker, no alcohol use    R leg pain:   -injection lower back either Monday or Tuesday   -now right hip numb  -difficulty going up stairs  -hx of L4-L5 disc bulge   -reports having an injection last week (steroid?)  - hx of right hip replacement.   - feels crampy , can radiate to her knee         4/24/2025     2:01 PM   Additional Questions   Roomed by Paw   Accompanied by N/A         4/24/2025    Information    services provided? Yes   Language Hmong   Type of interpretation provided Face-to-face    name Chi    Agency [facility]           Objective    /85   Pulse 69   Temp 98.3  F (36.8  C) (Oral)   Resp 20   Ht 1.55 m (5' 1.02\")   Wt 58.5 kg (129 lb)   SpO2 94%   BMI 24.36 kg/m    Body mass index is 24.36 kg/m .  Physical Exam   GEN: Pleasant female, in no acute distress.   HEENT: Normocephalic, atraumatic. Extraoccular eye movements intact. Anicteric sclera. Moist mucous membranes. Pain with palpation over left maxilla. Pain radiates to lateral left side of face. No mouth lesions or area of concern. Bilateral TM normal.    NECK: Supple. No cervical or supraclavicular adenopathy.   PULM: Non-labored breathing. No use of accessory muscles. Clear to ausculation bilaterally. No wheezes or crackles.   CV: Regular rate and rhythm. Normal S1, S2. No rubs, murmurs, or gallops.    ABDOMEN: Normoactive bowel sounds. Non-tender to palpation. Non-distended.    EXTREMITES:  No clubbing, cyanosis, or edema. Pain   NEURO:  Awake. Oriented to person, place, time and situation. Cranial nerves 2-12 grossly intact. Moving all extremities. R patellar reflex +2. Pain with palpation over right buttocks & right lateral hip. Right lower extremity strength & sensation normal.   PSYCH: Calm. Appropriate affect, insight, judgment.         Signed Electronically by: Marie Morales MD    "

## 2025-04-24 NOTE — PROGRESS NOTES
Faculty Supervision of Residents   I have examined this patient and the medical care has been evaluated and discussed with the resident. See resident note outlining our discussion. Unusual hx of nostril pain, rec MRI of face, discussed referral back to ortho for sxs after injection. Not acute neuro changes    Armida Bhatia MD

## 2025-05-04 ENCOUNTER — HOSPITAL ENCOUNTER (OUTPATIENT)
Dept: MRI IMAGING | Facility: HOSPITAL | Age: 55
Discharge: HOME | End: 2025-05-04
Attending: FAMILY MEDICINE | Admitting: FAMILY MEDICINE
Payer: COMMERCIAL

## 2025-05-04 DIAGNOSIS — D25.1 INTRAMURAL LEIOMYOMA OF UTERUS: ICD-10-CM

## 2025-05-04 DIAGNOSIS — N85.02 ENDOMETRIAL HYPERPLASIA WITH ATYPIA: ICD-10-CM

## 2025-05-04 PROCEDURE — A9585 GADOBUTROL INJECTION: HCPCS | Performed by: FAMILY MEDICINE

## 2025-05-04 PROCEDURE — 72197 MRI PELVIS W/O & W/DYE: CPT

## 2025-05-04 PROCEDURE — T1013 SIGN LANG/ORAL INTERPRETER: HCPCS | Performed by: INTERPRETER

## 2025-05-04 PROCEDURE — 255N000002 HC RX 255 OP 636: Performed by: FAMILY MEDICINE

## 2025-05-04 RX ORDER — GADOBUTROL 604.72 MG/ML
0.1 INJECTION INTRAVENOUS ONCE
Status: COMPLETED | OUTPATIENT
Start: 2025-05-04 | End: 2025-05-04

## 2025-05-04 RX ADMIN — GADOBUTROL 6 ML: 604.72 INJECTION INTRAVENOUS at 10:51

## 2025-05-05 DIAGNOSIS — L30.9 ECZEMATOUS DERMATITIS: ICD-10-CM

## 2025-05-06 PROCEDURE — 99283 EMERGENCY DEPT VISIT LOW MDM: CPT

## 2025-05-06 ASSESSMENT — COLUMBIA-SUICIDE SEVERITY RATING SCALE - C-SSRS
2. HAVE YOU ACTUALLY HAD ANY THOUGHTS OF KILLING YOURSELF IN THE PAST MONTH?: NO
1. IN THE PAST MONTH, HAVE YOU WISHED YOU WERE DEAD OR WISHED YOU COULD GO TO SLEEP AND NOT WAKE UP?: NO
6. HAVE YOU EVER DONE ANYTHING, STARTED TO DO ANYTHING, OR PREPARED TO DO ANYTHING TO END YOUR LIFE?: NO

## 2025-05-07 ENCOUNTER — HOSPITAL ENCOUNTER (EMERGENCY)
Facility: HOSPITAL | Age: 55
Discharge: HOME | End: 2025-05-07
Attending: EMERGENCY MEDICINE | Admitting: EMERGENCY MEDICINE
Payer: COMMERCIAL

## 2025-05-07 VITALS
BODY MASS INDEX: 24.35 KG/M2 | TEMPERATURE: 98.4 F | WEIGHT: 129 LBS | RESPIRATION RATE: 20 BRPM | HEIGHT: 61 IN | SYSTOLIC BLOOD PRESSURE: 128 MMHG | OXYGEN SATURATION: 99 % | DIASTOLIC BLOOD PRESSURE: 75 MMHG | HEART RATE: 59 BPM

## 2025-05-07 DIAGNOSIS — R14.0 ABDOMINAL BLOATING: ICD-10-CM

## 2025-05-07 DIAGNOSIS — K62.5 RECTAL BLEEDING: ICD-10-CM

## 2025-05-07 LAB
ANION GAP SERPL CALCULATED.3IONS-SCNC: 7 MMOL/L (ref 7–15)
BUN SERPL-MCNC: 21.4 MG/DL (ref 6–20)
CALCIUM SERPL-MCNC: 8.8 MG/DL (ref 8.8–10.4)
CHLORIDE SERPL-SCNC: 106 MMOL/L (ref 98–107)
CREAT SERPL-MCNC: 0.76 MG/DL (ref 0.51–0.95)
EGFRCR SERPLBLD CKD-EPI 2021: >90 ML/MIN/1.73M2
ERYTHROCYTE [DISTWIDTH] IN BLOOD BY AUTOMATED COUNT: 12.2 % (ref 10–15)
GLUCOSE SERPL-MCNC: 118 MG/DL (ref 70–99)
HCO3 SERPL-SCNC: 28 MMOL/L (ref 22–29)
HCT VFR BLD AUTO: 41.3 % (ref 35–47)
HGB BLD-MCNC: 13.6 G/DL (ref 11.7–15.7)
INR PPP: 0.92 (ref 0.85–1.15)
MCH RBC QN AUTO: 29.6 PG (ref 26.5–33)
MCHC RBC AUTO-ENTMCNC: 32.9 G/DL (ref 31.5–36.5)
MCV RBC AUTO: 90 FL (ref 78–100)
PLATELET # BLD AUTO: 219 10E3/UL (ref 150–450)
POTASSIUM SERPL-SCNC: 3.9 MMOL/L (ref 3.4–5.3)
PROTHROMBIN TIME: 12.6 SECONDS (ref 11.8–14.8)
RBC # BLD AUTO: 4.59 10E6/UL (ref 3.8–5.2)
SODIUM SERPL-SCNC: 141 MMOL/L (ref 135–145)
WBC # BLD AUTO: 7 10E3/UL (ref 4–11)

## 2025-05-07 PROCEDURE — 80048 BASIC METABOLIC PNL TOTAL CA: CPT

## 2025-05-07 PROCEDURE — 36415 COLL VENOUS BLD VENIPUNCTURE: CPT | Performed by: EMERGENCY MEDICINE

## 2025-05-07 PROCEDURE — 85610 PROTHROMBIN TIME: CPT | Performed by: EMERGENCY MEDICINE

## 2025-05-07 PROCEDURE — 80051 ELECTROLYTE PANEL: CPT

## 2025-05-07 PROCEDURE — 85027 COMPLETE CBC AUTOMATED: CPT | Performed by: EMERGENCY MEDICINE

## 2025-05-07 RX ORDER — OMEPRAZOLE 20 MG/1
20 CAPSULE, DELAYED RELEASE ORAL DAILY
Qty: 30 CAPSULE | Refills: 0 | Status: SHIPPED | OUTPATIENT
Start: 2025-05-07 | End: 2025-06-06

## 2025-05-07 ASSESSMENT — ACTIVITIES OF DAILY LIVING (ADL): ADLS_ACUITY_SCORE: 41

## 2025-05-07 NOTE — DISCHARGE INSTRUCTIONS
You were seen in the Emergency Department today for rectal bleeding.      Please return to the ER if you experience shortness of breath, uncontrolled pain, fever, and/or for any other new or concerning symptoms, otherwise please follow up with your primary doctor and the GI team for recheck. I put in a referral for the GI doctors but I would also recommend that you call them to schedule follow up.     I am sending you with a prescription for an antacid medication that you should start taking until you see the GI doctors.     Below is some information you might find useful.     Thank you for choosing [facility]. It was a pleasure taking care of you today!  - Dr. Soha Staton

## 2025-05-07 NOTE — ED TRIAGE NOTES
States intermittent rectal bleeding x 2 mos. Has increased frequency to daily in past 2 weeks. No abd pain. Denies hard stools. Has intermittently felt lightheaded when passing blood.     Triage Assessment (Adult)       Row Name 05/06/25 3754          Triage Assessment    Airway WDL WDL        Respiratory WDL    Respiratory WDL WDL        Skin Circulation/Temperature WDL    Skin Circulation/Temperature WDL WDL        Cardiac WDL    Cardiac WDL WDL        Peripheral/Neurovascular WDL    Peripheral Neurovascular WDL WDL        Cognitive/Neuro/Behavioral WDL    Cognitive/Neuro/Behavioral WDL WDL

## 2025-05-07 NOTE — ED PROVIDER NOTES
EMERGENCY DEPARTMENT ENCOUNTER      NAME: Cindy Smith  AGE: 54 year old female  YOB: 1970  EVALUATION DATE & TIME: No admission date for patient encounter.    ED PROVIDER: Soha Staton MD    Chief Complaint   Patient presents with    Rectal Bleeding       FINAL IMPRESSION  1. Rectal bleeding    2. Abdominal bloating        MEDICAL DECISION MAKING   Cindy Smith is a 54 year old female who presents with  for evaluation of intermittent bright red rectal bleeding that has been present for months but worse over the last 2 weeks. She has been seen by [facility] in the past and reports she has a follow up appointment on June 9 but because the symptoms were worsening, decided to come here. She reports associated abdominal bloating but denies rectal or abdominal pain. She denies fever, chills, nausea, vomiting, or urinary symptoms. Also no chest pain or dyspnea. She is not on a blood thinner.     Records reviewed. Patient seen by [facility] on 9/23/2025 and underwent colonoscopy.  This was apparently complicated by postprocedural bleeding for which she was seen in the ED.  She did follow-up with [facility] again on 2/25/2025 and I reviewed that note.  Colonoscopy 1/23/2025 apparently revealed ulcerative colitis.    I considered a broad differential including but not limited to diverticulosis, AVM, colitis, hemorrhoids, fissure, ischemia, anemia, electrolyte derangement, FRANCISCO, coagulopathy, IBD, IBS.  She does not have any associated abdominal or rectal pain.  She also does not believe that she has had any hemorrhoids.  Patient notes that she follows with GI as had similar symptoms in the past, has an appointment upcoming on 6/9/2025.  Discussed options for workup and management with patient.  She was initially evaluated in triage due to boarding crisis involvement in the department.  We have agreed on plan to start with basic labs, hold on imaging for now.    CBC reassuring. No evidence of leukocytosis to suggest  systemic infectious/inflammatory process. No acute anemia. PLTs wnl. BMP reassuring. No evidence of FRANCISCO, acidosis, or significant electrolyte derangement.  Patient updated her with these results.  We discussed potential etiology of her symptoms as well as options for ongoing workup follow-up.  I did offer a CT scan to rule out colitis flare, active bleeding but hemoglobin here is within normal limits and she is not having any pain with, we have agreed on plan to hold on this and instead have her follow-up closely with [facility].  I encouraged her to call the clinic to see if they might be able to expedite her appointment I will also put in a referral to see if this might help facilitate her being seen in clinic any sooner.     At the end of the encounter, we reviewed the results, potential diagnoses, as well as return precautions and recommendations for follow up. I instructed Ms. Smith to return to the emergency department immediately if she develops any new or worsening symptoms and provided additional verbal discharge instructions. Ms. Smith expressed understanding and agreement with this plan of care, her questions were answered, and she was discharged in stable condition.      Additional Considerations in MDM  History:  Supplemental history from: Family  External Record(s) reviewed: None    Work Up:  Chart documentation includes differential diagnoses considered and any EKGs or imaging independently interpreted as specified above.   In additional to work up documented, I considered additional advanced imaging and laboratory workup but deferred after shared decision making conversation with patient/family    External Consultation(s):  Discussion of management with another provider as documented above and in ED course     Chronic Illness(es):  Care impacted by chronic illness(es): NA    Disposition considerations: Discharge. I discussed a prescription for Zofran, but deferred after shared decision making  "discussion.. I considered admission, but discharged the patient after share decision making conversation.    MIPS: Not Applicable     Sepsis/STEMI/Stroke: None      ED COURSE  10:48 PM I met with the patient, obtained history, performed an initial exam, and discussed options and plan for diagnostics and treatment here in the ED.   1:37 AM I rechecked and updated the patient on results.        MEDICATIONS GIVEN IN THE ED  Medications - No data to display    NEW PRESCRIPTIONS STARTED AT TODAY'S VISIT  Discharge Medication List as of 5/7/2025  2:38 AM        START taking these medications    Details   omeprazole (PRILOSEC) 20 MG DR capsule Take 1 capsule (20 mg) by mouth daily., Disp-30 capsule, R-0, E-Prescribe                =================================================================    HPI:    Use of : N/A      Cindy Smith is a 54 year old female who presents with rectal bleeding.     The patient presents with rectal bleeding that began 1 month ago but has been worse the last 2 weeks. This rectal bleeding is only present with bowel movements but has been constant for the last 2 weeks. She has no pain with this but does feel that she has increased bloating and gas. She previously followed with GI and takes the medications they prescribes in addition to a medication she inserts rectally for her hemorrhoids. In the past, she was told she has \"a little bit of hemorrhoids\". Her next GI appointment is scheduled for late May. No blood thinners.    She denies epigastric pain or any other complaints at this time.       RELEVANT HISTORY, MEDICATIONS, & ALLERGIES   Past medical history, surgical history, family history, medications, and allergies reviewed and pertinent noted in HPI.    REVIEW OF SYSTEMS:  A complete review of systems was performed with pertinent positives and negatives noted in the HPI.     PHYSICAL EXAM:    Vitals: /75   Pulse 59   Temp 98.4  F (36.9  C) (Oral)   Resp 20   Ht 1.549 m " "(5' 1\")   Wt 58.5 kg (129 lb)   SpO2 99%   BMI 24.37 kg/m     General: Alert and interactive, comfortable appearing.  HENT: Atraumatic. Full AROM of neck. MMM.  Cardiovascular: Regular rate and rhythm.   Chest/Pulmonary: Normal work of breathing. Speaking in complete sentences. Lungs CTAB. No chest wall tenderness or deformities.  Abdomen: Soft, nondistended. Nontender without guarding or rebound.   Extremities: Normal AROM of all major joints.  Skin: Warm and dry. Normal skin color.   Neuro: Speech clear. CNs grossly intact. Moves all extremities spontaneously.   Psych: Normal affect/mood, cooperative, memory appropriate.      LAB  Labs Ordered and Resulted from Time of ED Arrival to Time of ED Departure   BASIC METABOLIC PANEL - Abnormal       Result Value    Sodium 141      Potassium 3.9      Chloride 106      Carbon Dioxide (CO2) 28      Anion Gap 7      Urea Nitrogen 21.4 (*)     Creatinine 0.76      GFR Estimate >90      Calcium 8.8      Glucose 118 (*)    INR - Normal    INR 0.92      PT 12.6     CBC WITH PLATELETS - Normal    WBC Count 7.0      RBC Count 4.59      Hemoglobin 13.6      Hematocrit 41.3      MCV 90      MCH 29.6      MCHC 32.9      RDW 12.2      Platelet Count 219           I, Benny Sam, am serving as a scribe to document services personally performed by Dr. Soha Staton based on my observation and the provider's statements to me. I, Soha Staton MD attest that Benny Sam is acting in a scribe capacity, has observed my performance of the services and has documented them in accordance with my direction.    Soha Staton M.D.  Emergency Medicine  [facility] EMERGENCY DEPARTMENT  [address] [place] [id number]  [phone]  Dept: [phone]     Soha Staton MD  05/07/25 1947    "

## 2025-05-08 RX ORDER — DUPILUMAB 300 MG/2ML
INJECTION, SOLUTION SUBCUTANEOUS
Qty: 4 ML | Refills: 0 | Status: SHIPPED | OUTPATIENT
Start: 2025-05-08

## 2025-05-08 NOTE — TELEPHONE ENCOUNTER
Next derm follow up 5/29/25, 1 month refill sent at this time and I will send further refills after next follow up.

## 2025-05-10 ENCOUNTER — HOSPITAL ENCOUNTER (OUTPATIENT)
Dept: MRI IMAGING | Facility: HOSPITAL | Age: 55
Discharge: HOME | End: 2025-05-10
Attending: FAMILY MEDICINE | Admitting: FAMILY MEDICINE
Payer: COMMERCIAL

## 2025-05-10 DIAGNOSIS — R51.9 FACIAL PAIN: ICD-10-CM

## 2025-05-10 PROCEDURE — A9585 GADOBUTROL INJECTION: HCPCS | Performed by: FAMILY MEDICINE

## 2025-05-10 PROCEDURE — 255N000002 HC RX 255 OP 636: Performed by: FAMILY MEDICINE

## 2025-05-10 PROCEDURE — 70543 MRI ORBT/FAC/NCK W/O &W/DYE: CPT

## 2025-05-10 RX ORDER — GADOBUTROL 604.72 MG/ML
0.1 INJECTION INTRAVENOUS ONCE
Status: COMPLETED | OUTPATIENT
Start: 2025-05-10 | End: 2025-05-10

## 2025-05-10 RX ADMIN — GADOBUTROL 6 ML: 604.72 INJECTION INTRAVENOUS at 18:48

## 2025-05-12 ENCOUNTER — RESULTS FOLLOW-UP (OUTPATIENT)
Dept: FAMILY MEDICINE | Facility: CLINIC | Age: 55
End: 2025-05-12

## 2025-05-15 ENCOUNTER — RESULTS FOLLOW-UP (OUTPATIENT)
Dept: FAMILY MEDICINE | Facility: CLINIC | Age: 55
End: 2025-05-15

## 2025-05-27 ENCOUNTER — TELEPHONE (OUTPATIENT)
Dept: DERMATOLOGY | Facility: CLINIC | Age: 55
End: 2025-05-27
Payer: COMMERCIAL

## 2025-05-27 NOTE — TELEPHONE ENCOUNTER
PA Initiation    Medication: DUPIXENT 300 MG/2ML SC SOAJ  Insurance Company: [employer] - Phone [phone] Fax [phone]  Pharmacy Filling the Rx: [facility] - [place], [place] - [facility] [address]  Filling Pharmacy Phone: [phone]  Filling Pharmacy Fax: [phone]  Start Date: 5/27/2025         Thank you,     Edy Gil [facility]  Pharmacy Clinic [facility]  [email or web address]   Phone: [phone]  Fax: [phone]

## 2025-05-28 NOTE — TELEPHONE ENCOUNTER
Prior Authorization Approval    Medication: DUPIXENT 300 MG/2ML SC SOAJ  Authorization Effective Date: 5/28/2025  Authorization Expiration Date: 5/28/2026  Approved Dose/Quantity: 4 ml per 28 days  Reference #: [id number]   Insurance Company: [employer] - Phone [phone] Fax [phone]  Expected CoPay: $  0  CoPay Card Available:      Financial Assistance Needed: N/A - PMAP plan  Which Pharmacy is filling the prescription: [facility] - [address]  Pharmacy Notified: Yes - updated approval information in ERX  Patient Notified: No - renewal only      Thank you,     Edy Gil [facility]  [facility] Clinic [email or web address]   Phone: [phone]  Fax: [phone]

## 2025-06-02 DIAGNOSIS — L30.9 ECZEMATOUS DERMATITIS: ICD-10-CM

## 2025-06-02 NOTE — TELEPHONE ENCOUNTER
Patient missed last derm appointment on 5/29/25. Called patient to follow up and check on symptoms ( ID [id number]), but no answer, LVM.  Her  answered, but he was not with her.  She last filled Dupixent on 5/16/2025, therefore she should have some at home therefore I will try calling her back in a few days to follow-up, set up future Derm follow-up, and refill Dupixent.

## 2025-06-19 RX ORDER — DUPILUMAB 300 MG/2ML
INJECTION, SOLUTION SUBCUTANEOUS
Qty: 4 ML | Refills: 0 | Status: SHIPPED | OUTPATIENT
Start: 2025-06-19

## 2025-06-25 ENCOUNTER — TELEPHONE (OUTPATIENT)
Dept: DERMATOLOGY | Facility: CLINIC | Age: 55
End: 2025-06-25
Payer: COMMERCIAL

## 2025-06-25 DIAGNOSIS — L30.9 ECZEMATOUS DERMATITIS: ICD-10-CM

## 2025-06-25 RX ORDER — DUPILUMAB 300 MG/2ML
300 INJECTION, SOLUTION SUBCUTANEOUS
Qty: 4 ML | Refills: 0 | Status: SHIPPED | OUTPATIENT
Start: 2025-06-25

## 2025-06-25 NOTE — TELEPHONE ENCOUNTER
[facility] Call Center    Phone Message    May a detailed message be left on voicemail: yes     Reason for Call: Medication Refill Request    Has the patient contacted the pharmacy for the refill? Yes   Name of medication being requested: Dupixent   Provider who prescribed the medication: Hyun Urias, PharmD   Pharmacy:  specialty  Date medication is needed: asap     Pt has previously seen Dr. Thompson, not able to schedule appts with any residents, please schedule follow up.     Ashley has consent to communicate in chart    Action Taken: Message routed to:  [facility] Derm    Travel Screening: Not Applicable     Date of Service:

## 2025-06-26 ENCOUNTER — APPOINTMENT (OUTPATIENT)
Dept: INTERPRETER SERVICES | Facility: CLINIC | Age: 55
End: 2025-06-26
Payer: COMMERCIAL

## 2025-07-07 ENCOUNTER — TELEPHONE (OUTPATIENT)
Dept: PHARMACY | Facility: CLINIC | Age: 55
End: 2025-07-07
Payer: COMMERCIAL

## 2025-07-07 NOTE — TELEPHONE ENCOUNTER
Pt is due for follow up with Hyun ANNE     Using an , call placed to pt to initiate scheduling on 07/07/25    Outcome: EVELIOM

## 2025-07-28 ENCOUNTER — APPOINTMENT (OUTPATIENT)
Dept: INTERPRETER SERVICES | Facility: CLINIC | Age: 55
End: 2025-07-28
Payer: COMMERCIAL

## 2025-07-28 ENCOUNTER — TELEPHONE (OUTPATIENT)
Dept: DERMATOLOGY | Facility: CLINIC | Age: 55
End: 2025-07-28
Payer: COMMERCIAL

## 2025-07-28 NOTE — TELEPHONE ENCOUNTER
7/28     Order for ALEISHA Ramirez or Oz for November. Next MAYELA available is January 2026. Okay to use?

## 2025-07-28 NOTE — PROGRESS NOTES
Medication Therapy Management (MTM) Encounter    ASSESSMENT:                            Eczematous dermatitis, favor atopic: Improvement with her rash on areas of her body, except for her neck continues to bother her. POEM score is elevated (= 28), but this is all attributed to the lingering rash on her neck.  She appears to be tolerating Dupixent with no side effects.  Recommended to continue 300 mg every 14 days, refill provided today.  I recommended that she  the pimecrolimus cream from [employer] and initiate use on her neck.  PDC of 1.00 indicates no adherence issues.   Medication/allergy list updated in Epic; no concerns for new drug interactions.   Patient's questions/concerns were addressed today.   Due for next MTM follow-up in 6 months (~1/29/2026), however I will reach out in about a month briefly to follow-up on her neck symptoms.    Ulcerative rectosigmoiditis: Continue current regimen and following with [facility].    Neuropathy: Sounds like her symptoms have improved, okay to continue to use gabapentin as needed.    PLAN:                            1.  Continue Dupixent 300 mg every 14 days, refill sent  2.  Patient to  and initiate pimecrolimus 1% cream on neck    Follow-up: 1 month MTM follow-up on pimecrolimus    SUBJECTIVE/OBJECTIVE:                          Cindy Smith is a 55 year old female seen for a follow up visit. She was referred to me from Dr. Amanda. Patient seen with [facility] .    Reason for visit: Dupixent follow-up  Medication Adherence/Access: no issues reported.    Eczematous dermatitis, favor atopic:   -Dupixent subcutaneous 300 mg every 14 days   PA approved 5/28/25 - 5/28/26   [facility]  - Pimecrolimus 1% cream twice daily as needed  - clobetasol 0.05% ointment twice daily as needed for severe flares, for up to 2 weeks at a time  Last MTM appointment 2/11/25.   Rash has been chronic, with onset 2 to 3 years ago with intermittent flaring every 2  months. No warning, can come a few times a month.  When the rash occurs, it does affect her quality of life significantly, especially the itching. In adequate control with topical steroids.  It was recommended to increase her topical steroid to clobetasol (replaced betamethasone and triamcinolone), start tacrolimus, and start Dupixent.     Since starting Dupixent, he does feel that the itching has improved on her stomach, chest, and back but the rash on her neck continues to bother her.  The rash comes and goes every 1 to 2 weeks and is very itchy.  She is not using any prescription topicals.  When she last met with dermatology on 7/24/2025, she was started on pimecrolimus but she is not aware of this.  She does not know if she is taking cetirizine.  Reports that she will scratch her skin so much that it will break and bleed.  Denies Side effects to Dupixent, no injection site issues since loading dose misfire or ophthalmic changes.    Last follow up with derm on 7/24/25 she was referred to allergy for patch testing.     PDC = 1.00    POEM (Patient-Oriented Eczema Measure)    Over the last week, on how many days has your:    ...skin been itchy because of the eczema? Every day (4 pts)     Intensity of itching:  Severe   ...sleep been disturbed because of the eczema?  Every day (4 pts)   ...skin been bleeding because of the eczema? Every day (4 pts)   ...skin been weeping or oozing clear fluid because of the eczema?  Every day (4 pts)   ...skin been cracked because of the eczema?  Every day (4 pts)   ...skin been flaking off because of the eczema? Every day (4 pts)   ...skin felt dry or rough because of the eczema? Every day (4 pts)   Total POEM Score (max of 28; each question 0-4 points)   28     Ulcerative rectosigmoiditis:   - Balsalazide 750 mg x 3 (2250 mg) 3 times daily  - Mesalamine suppository HS   - MiraLAX daily   Reports that she is using the mesalamine suppository every night instead of the enema since it  seems to work better for her. Reports she continues to have some occasional bleeding but not sure if it is due to something she eats. Last colonoscopy 5/29/25. She is following with [facility].     Neuropathy:   - gabapentin 100 mg THREE TIMES DAILY PRN   Patient met with PCP on 5/15/25 and was started on gabapentin -- she reports that at first she was having pain when she would walk then after taking it does seem to have helped decrease the discomfort. Previously she would have a stabbing pain and now she no longer experiencing this. Reports that she is no longer taking, but now just as needed.       Today's Vitals: There were no vitals taken for this visit.    Allergies/ADRs: Reviewed in chart  Past Medical History: Reviewed in chart  Tobacco: She reports that she has never smoked. She has never used smokeless tobacco.  Alcohol: reviewed in chart    ----------------    I spent 32 minutes with this patient today. All changes were made via collaborative practice agreement with Jodi Amanda.     A summary of these recommendations was declined by the patient.    Hyun Urias, Pharm.D., BCACP   Medication Therapy Management Pharmacist   [facility]    Telemedicine Visit Details  The patient's medications can be safely assessed via a telemedicine encounter.  Type of service:  Telephone visit  Originating Location (pt. Location): Home    Distant Location (provider location):  Off-site  Start Time: 12:30 PM  End Time: 1:02 PM     Medication Therapy Recommendations  No medication therapy recommendations to display

## 2025-07-29 ENCOUNTER — VIRTUAL VISIT (OUTPATIENT)
Dept: PHARMACY | Facility: CLINIC | Age: 55
End: 2025-07-29
Attending: DERMATOLOGY
Payer: COMMERCIAL

## 2025-07-29 ENCOUNTER — TELEPHONE (OUTPATIENT)
Dept: DERMATOLOGY | Facility: CLINIC | Age: 55
End: 2025-07-29
Payer: COMMERCIAL

## 2025-07-29 DIAGNOSIS — G62.9 NEUROPATHY: ICD-10-CM

## 2025-07-29 DIAGNOSIS — L30.9 ECZEMATOUS DERMATITIS: Primary | ICD-10-CM

## 2025-07-29 DIAGNOSIS — K51.30 ULCERATIVE RECTOSIGMOIDITIS WITHOUT COMPLICATION (H): ICD-10-CM

## 2025-07-29 PROCEDURE — T1013 SIGN LANG/ORAL INTERPRETER: HCPCS | Mod: U4,TEL,95

## 2025-07-29 RX ORDER — DUPILUMAB 300 MG/2ML
300 INJECTION, SOLUTION SUBCUTANEOUS
Qty: 4 ML | Refills: 5 | Status: SHIPPED | OUTPATIENT
Start: 2025-07-29

## 2025-07-29 NOTE — TELEPHONE ENCOUNTER
7/29 Patient confirmed scheduled appointment:  Date: 1/8/26  Time: 9am  Visit type: Return Dermatology  Provider: James  Location: [facility]  Testing/imaging: na  Additional notes: MAYELA Roa       abrasion/ bruising/PAIN

## 2025-07-30 ENCOUNTER — TRANSFERRED RECORDS (OUTPATIENT)
Dept: MULTI SPECIALTY CLINIC | Facility: CLINIC | Age: 55
End: 2025-07-30
Payer: COMMERCIAL

## 2025-07-30 LAB
ALT SERPL-CCNC: 55 IU/L (ref 0–32)
AST SERPL-CCNC: 31 IU/L (ref 0–40)
CREATININE (EXTERNAL): 0.96 MG/DL (ref 0.57–1)
GFR ESTIMATED (EXTERNAL): 69 ML/MIN/1.73
GLUCOSE (EXTERNAL): 180 MG/DL (ref 70–99)
POTASSIUM (EXTERNAL): 4 MMOL/L (ref 3.5–5.2)
TSH SERPL-ACNC: 1.46 UIU/ML (ref 0.45–4.5)

## 2025-07-31 ENCOUNTER — TRANSFERRED RECORDS (OUTPATIENT)
Dept: MULTI SPECIALTY CLINIC | Facility: CLINIC | Age: 55
End: 2025-07-31
Payer: COMMERCIAL

## 2025-07-31 LAB
ALT SERPL-CCNC: 15 IU/L (ref 0–32)
AST SERPL-CCNC: 23 IU/L (ref 0–40)
CREATININE (EXTERNAL): 0.6 MG/DL (ref 0.57–1)
GFR ESTIMATED (EXTERNAL): 106 ML/MIN/1.73
GLUCOSE (EXTERNAL): 92 MG/DL (ref 70–99)
POTASSIUM (EXTERNAL): 4.1 MMOL/L (ref 3.5–5.2)

## 2025-08-26 DIAGNOSIS — J30.89 SEASONAL ALLERGIC RHINITIS DUE TO OTHER ALLERGIC TRIGGER: Primary | ICD-10-CM

## 2025-08-26 RX ORDER — CETIRIZINE HYDROCHLORIDE 10 MG/1
10 TABLET ORAL DAILY
Qty: 30 TABLET | Refills: 5 | Status: SHIPPED | OUTPATIENT
Start: 2025-08-26

## 2025-08-28 ENCOUNTER — OFFICE VISIT (OUTPATIENT)
Dept: FAMILY MEDICINE | Facility: CLINIC | Age: 55
End: 2025-08-28
Payer: COMMERCIAL

## 2025-08-28 VITALS
SYSTOLIC BLOOD PRESSURE: 121 MMHG | DIASTOLIC BLOOD PRESSURE: 77 MMHG | HEIGHT: 61 IN | BODY MASS INDEX: 24.17 KG/M2 | OXYGEN SATURATION: 94 % | HEART RATE: 66 BPM | WEIGHT: 128 LBS | RESPIRATION RATE: 18 BRPM | TEMPERATURE: 98 F

## 2025-08-28 DIAGNOSIS — R20.0 NUMBNESS AND TINGLING OF BOTH FEET: ICD-10-CM

## 2025-08-28 DIAGNOSIS — R20.2 NUMBNESS AND TINGLING OF BOTH FEET: ICD-10-CM

## 2025-08-28 DIAGNOSIS — R20.0 NUMBNESS AND TINGLING IN BOTH HANDS: Primary | ICD-10-CM

## 2025-08-28 DIAGNOSIS — R20.2 NUMBNESS AND TINGLING IN BOTH HANDS: Primary | ICD-10-CM

## 2025-08-28 LAB
ALBUMIN SERPL BCG-MCNC: 4.1 G/DL (ref 3.5–5.2)
ALP SERPL-CCNC: 76 U/L (ref 40–150)
ALT SERPL W P-5'-P-CCNC: 35 U/L (ref 0–50)
ANION GAP SERPL CALCULATED.3IONS-SCNC: 10 MMOL/L (ref 7–15)
AST SERPL W P-5'-P-CCNC: 36 U/L (ref 0–45)
BASOPHILS # BLD AUTO: 0.06 10E3/UL (ref 0–0.2)
BASOPHILS NFR BLD AUTO: 1.2 %
BILIRUB SERPL-MCNC: 0.2 MG/DL
BUN SERPL-MCNC: 19.7 MG/DL (ref 6–20)
CALCIUM SERPL-MCNC: 9.4 MG/DL (ref 8.8–10.4)
CHLORIDE SERPL-SCNC: 106 MMOL/L (ref 98–107)
CREAT SERPL-MCNC: 0.69 MG/DL (ref 0.51–0.95)
EGFRCR SERPLBLD CKD-EPI 2021: >90 ML/MIN/1.73M2
EOSINOPHIL # BLD AUTO: 0.25 10E3/UL (ref 0–0.7)
EOSINOPHIL NFR BLD AUTO: 4.8 %
ERYTHROCYTE [DISTWIDTH] IN BLOOD BY AUTOMATED COUNT: 12.1 % (ref 10–15)
EST. AVERAGE GLUCOSE BLD GHB EST-MCNC: 111 MG/DL
GLUCOSE SERPL-MCNC: 123 MG/DL (ref 70–99)
HBA1C MFR BLD: 5.5 % (ref 0–5.6)
HCO3 SERPL-SCNC: 24 MMOL/L (ref 22–29)
HCT VFR BLD AUTO: 45.1 % (ref 35–47)
HGB BLD-MCNC: 14.3 G/DL (ref 11.7–15.7)
IMM GRANULOCYTES # BLD: <0.04 10E3/UL
IMM GRANULOCYTES NFR BLD: 0.2 %
LYMPHOCYTES # BLD AUTO: 2.07 10E3/UL (ref 0.8–5.3)
LYMPHOCYTES NFR BLD AUTO: 39.9 %
MCH RBC QN AUTO: 29.2 PG (ref 26.5–33)
MCHC RBC AUTO-ENTMCNC: 31.7 G/DL (ref 31.5–36.5)
MCV RBC AUTO: 92.2 FL (ref 78–100)
MONOCYTES # BLD AUTO: 0.36 10E3/UL (ref 0–1.3)
MONOCYTES NFR BLD AUTO: 6.9 %
NEUTROPHILS # BLD AUTO: 2.44 10E3/UL (ref 1.6–8.3)
NEUTROPHILS NFR BLD AUTO: 47 %
PLATELET # BLD AUTO: 187 10E3/UL (ref 150–450)
POTASSIUM SERPL-SCNC: 4.5 MMOL/L (ref 3.4–5.3)
PROT SERPL-MCNC: 6.7 G/DL (ref 6.4–8.3)
PROT SERPL-MCNC: 6.9 G/DL (ref 6.4–8.3)
RBC # BLD AUTO: 4.89 10E6/UL (ref 3.8–5.2)
SODIUM SERPL-SCNC: 140 MMOL/L (ref 135–145)
TSH SERPL DL<=0.005 MIU/L-ACNC: 2.1 UIU/ML (ref 0.3–4.2)
VIT B12 SERPL-MCNC: 690 PG/ML (ref 232–1245)
WBC # BLD AUTO: 5.19 10E3/UL (ref 4–11)

## 2025-08-28 ASSESSMENT — ANXIETY QUESTIONNAIRES
7. FEELING AFRAID AS IF SOMETHING AWFUL MIGHT HAPPEN: MORE THAN HALF THE DAYS
1. FEELING NERVOUS, ANXIOUS, OR ON EDGE: MORE THAN HALF THE DAYS
GAD7 TOTAL SCORE: 14
2. NOT BEING ABLE TO STOP OR CONTROL WORRYING: MORE THAN HALF THE DAYS
3. WORRYING TOO MUCH ABOUT DIFFERENT THINGS: MORE THAN HALF THE DAYS
GAD7 TOTAL SCORE: 14
5. BEING SO RESTLESS THAT IT IS HARD TO SIT STILL: MORE THAN HALF THE DAYS
IF YOU CHECKED OFF ANY PROBLEMS ON THIS QUESTIONNAIRE, HOW DIFFICULT HAVE THESE PROBLEMS MADE IT FOR YOU TO DO YOUR WORK, TAKE CARE OF THINGS AT HOME, OR GET ALONG WITH OTHER PEOPLE: VERY DIFFICULT
6. BECOMING EASILY ANNOYED OR IRRITABLE: MORE THAN HALF THE DAYS
7. FEELING AFRAID AS IF SOMETHING AWFUL MIGHT HAPPEN: MORE THAN HALF THE DAYS
GAD7 TOTAL SCORE: 14
4. TROUBLE RELAXING: MORE THAN HALF THE DAYS
8. IF YOU CHECKED OFF ANY PROBLEMS, HOW DIFFICULT HAVE THESE MADE IT FOR YOU TO DO YOUR WORK, TAKE CARE OF THINGS AT HOME, OR GET ALONG WITH OTHER PEOPLE?: VERY DIFFICULT

## 2025-08-28 ASSESSMENT — PATIENT HEALTH QUESTIONNAIRE - PHQ9
10. IF YOU CHECKED OFF ANY PROBLEMS, HOW DIFFICULT HAVE THESE PROBLEMS MADE IT FOR YOU TO DO YOUR WORK, TAKE CARE OF THINGS AT HOME, OR GET ALONG WITH OTHER PEOPLE: VERY DIFFICULT
SUM OF ALL RESPONSES TO PHQ QUESTIONS 1-9: 12
SUM OF ALL RESPONSES TO PHQ QUESTIONS 1-9: 12

## 2025-09-02 ENCOUNTER — RESULTS FOLLOW-UP (OUTPATIENT)
Dept: ORTHOPEDICS | Facility: CLINIC | Age: 55
End: 2025-09-02
Payer: COMMERCIAL

## 2025-09-02 LAB
ALBUMIN SERPL ELPH-MCNC: 4.1 G/DL (ref 3.7–5.1)
ALPHA1 GLOB SERPL ELPH-MCNC: 0.2 G/DL (ref 0.2–0.4)
ALPHA2 GLOB SERPL ELPH-MCNC: 0.5 G/DL (ref 0.5–0.9)
B-GLOBULIN SERPL ELPH-MCNC: 0.8 G/DL (ref 0.6–1)
GAMMA GLOB SERPL ELPH-MCNC: 1.1 G/DL (ref 0.7–1.6)
M PROTEIN SERPL ELPH-MCNC: 0 G/DL
PROT PATTERN SERPL ELPH-IMP: NORMAL